# Patient Record
Sex: FEMALE | Race: BLACK OR AFRICAN AMERICAN | Employment: OTHER | URBAN - METROPOLITAN AREA
[De-identification: names, ages, dates, MRNs, and addresses within clinical notes are randomized per-mention and may not be internally consistent; named-entity substitution may affect disease eponyms.]

---

## 2017-01-12 ENCOUNTER — OFFICE VISIT (OUTPATIENT)
Dept: FAMILY MEDICINE | Facility: CLINIC | Age: 77
End: 2017-01-12
Payer: COMMERCIAL

## 2017-01-12 VITALS
OXYGEN SATURATION: 99 % | DIASTOLIC BLOOD PRESSURE: 68 MMHG | TEMPERATURE: 99.1 F | BODY MASS INDEX: 27.32 KG/M2 | HEART RATE: 60 BPM | SYSTOLIC BLOOD PRESSURE: 130 MMHG | WEIGHT: 164 LBS | RESPIRATION RATE: 16 BRPM | HEIGHT: 65 IN

## 2017-01-12 DIAGNOSIS — G44.229 CHRONIC TENSION-TYPE HEADACHE, NOT INTRACTABLE: ICD-10-CM

## 2017-01-12 DIAGNOSIS — K59.01 SLOW TRANSIT CONSTIPATION: ICD-10-CM

## 2017-01-12 DIAGNOSIS — R30.0 DYSURIA: ICD-10-CM

## 2017-01-12 DIAGNOSIS — J30.1 SEASONAL ALLERGIC RHINITIS DUE TO POLLEN: ICD-10-CM

## 2017-01-12 DIAGNOSIS — R53.81 MALAISE AND FATIGUE: ICD-10-CM

## 2017-01-12 DIAGNOSIS — H90.5 SENSORY HEARING LOSS, UNILATERAL: ICD-10-CM

## 2017-01-12 DIAGNOSIS — R41.3 MEMORY LOSS: ICD-10-CM

## 2017-01-12 DIAGNOSIS — K21.9 GASTROESOPHAGEAL REFLUX DISEASE WITHOUT ESOPHAGITIS: Chronic | ICD-10-CM

## 2017-01-12 DIAGNOSIS — Z13.1 SCREENING FOR DIABETES MELLITUS: ICD-10-CM

## 2017-01-12 DIAGNOSIS — F41.1 ANXIETY STATE: ICD-10-CM

## 2017-01-12 DIAGNOSIS — E55.9 VITAMIN D INSUFFICIENCY: ICD-10-CM

## 2017-01-12 DIAGNOSIS — M81.0 OSTEOPOROSIS: ICD-10-CM

## 2017-01-12 DIAGNOSIS — E55.9 VITAMIN D DEFICIENCY DISEASE: ICD-10-CM

## 2017-01-12 DIAGNOSIS — R53.83 MALAISE AND FATIGUE: ICD-10-CM

## 2017-01-12 DIAGNOSIS — R35.0 URINARY FREQUENCY: ICD-10-CM

## 2017-01-12 DIAGNOSIS — H04.123 TEAR FILM INSUFFICIENCY, BILATERAL: ICD-10-CM

## 2017-01-12 DIAGNOSIS — R41.3 POOR MEMORY: ICD-10-CM

## 2017-01-12 DIAGNOSIS — G44.209 TENSION HEADACHE: ICD-10-CM

## 2017-01-12 DIAGNOSIS — F32.5 MAJOR DEPRESSION IN COMPLETE REMISSION (H): ICD-10-CM

## 2017-01-12 DIAGNOSIS — E78.5 HYPERLIPIDEMIA LDL GOAL <160: Primary | Chronic | ICD-10-CM

## 2017-01-12 DIAGNOSIS — M51.369 DDD (DEGENERATIVE DISC DISEASE), LUMBAR: Chronic | ICD-10-CM

## 2017-01-12 DIAGNOSIS — K64.4 EXTERNAL HEMORRHOIDS: ICD-10-CM

## 2017-01-12 LAB
ALBUMIN UR-MCNC: NEGATIVE MG/DL
APPEARANCE UR: CLEAR
BILIRUB UR QL STRIP: NEGATIVE
COLOR UR AUTO: YELLOW
ERYTHROCYTE [DISTWIDTH] IN BLOOD BY AUTOMATED COUNT: 13.1 % (ref 10–15)
GLUCOSE UR STRIP-MCNC: NEGATIVE MG/DL
HCT VFR BLD AUTO: 39.4 % (ref 35–47)
HGB BLD-MCNC: 13.1 G/DL (ref 11.7–15.7)
HGB UR QL STRIP: NEGATIVE
KETONES UR STRIP-MCNC: NEGATIVE MG/DL
LEUKOCYTE ESTERASE UR QL STRIP: NEGATIVE
MCH RBC QN AUTO: 30.4 PG (ref 26.5–33)
MCHC RBC AUTO-ENTMCNC: 33.2 G/DL (ref 31.5–36.5)
MCV RBC AUTO: 91 FL (ref 78–100)
NITRATE UR QL: NEGATIVE
PH UR STRIP: 5.5 PH (ref 5–7)
PLATELET # BLD AUTO: 163 10E9/L (ref 150–450)
RBC # BLD AUTO: 4.31 10E12/L (ref 3.8–5.2)
SP GR UR STRIP: 1.01 (ref 1–1.03)
URN SPEC COLLECT METH UR: NORMAL
UROBILINOGEN UR STRIP-ACNC: 1 EU/DL (ref 0.2–1)
WBC # BLD AUTO: 6.2 10E9/L (ref 4–11)

## 2017-01-12 PROCEDURE — 85027 COMPLETE CBC AUTOMATED: CPT | Performed by: FAMILY MEDICINE

## 2017-01-12 PROCEDURE — 80048 BASIC METABOLIC PNL TOTAL CA: CPT | Performed by: FAMILY MEDICINE

## 2017-01-12 PROCEDURE — 36415 COLL VENOUS BLD VENIPUNCTURE: CPT | Performed by: FAMILY MEDICINE

## 2017-01-12 PROCEDURE — 99214 OFFICE O/P EST MOD 30 MIN: CPT | Performed by: FAMILY MEDICINE

## 2017-01-12 PROCEDURE — 82306 VITAMIN D 25 HYDROXY: CPT | Performed by: FAMILY MEDICINE

## 2017-01-12 PROCEDURE — 81003 URINALYSIS AUTO W/O SCOPE: CPT | Performed by: FAMILY MEDICINE

## 2017-01-12 RX ORDER — POLYETHYLENE GLYCOL 3350 17 G/17G
1 POWDER, FOR SOLUTION ORAL DAILY
Qty: 510 G | Refills: 1 | Status: SHIPPED | OUTPATIENT
Start: 2017-01-12 | End: 2017-09-14

## 2017-01-12 RX ORDER — FLUTICASONE PROPIONATE 50 MCG
1 SPRAY, SUSPENSION (ML) NASAL DAILY
Qty: 16 G | Refills: 11 | Status: SHIPPED | OUTPATIENT
Start: 2017-01-12 | End: 2017-09-14

## 2017-01-12 RX ORDER — ACETAMINOPHEN 325 MG/1
325-650 TABLET ORAL EVERY 6 HOURS PRN
Qty: 180 TABLET | Refills: 11 | Status: SHIPPED | OUTPATIENT
Start: 2017-01-12 | End: 2017-06-01

## 2017-01-12 RX ORDER — CHOLECALCIFEROL (VITAMIN D3) 50 MCG
2 TABLET ORAL
Qty: 60 TABLET | Refills: 11 | Status: SHIPPED | OUTPATIENT
Start: 2017-01-12 | End: 2017-09-14

## 2017-01-12 RX ORDER — ATORVASTATIN CALCIUM 10 MG/1
10 TABLET, FILM COATED ORAL DAILY
Qty: 30 TABLET | Refills: 11 | Status: SHIPPED | OUTPATIENT
Start: 2017-01-12 | End: 2017-06-15

## 2017-01-12 RX ORDER — LIDOCAINE 50 MG/G
OINTMENT TOPICAL
Qty: 142 G | Refills: 11 | Status: SHIPPED
Start: 2017-01-12 | End: 2017-09-14

## 2017-01-12 RX ORDER — ALUMINA, MAGNESIA, AND SIMETHICONE 2400; 2400; 240 MG/30ML; MG/30ML; MG/30ML
30 SUSPENSION ORAL 4 TIMES DAILY PRN
Qty: 1 BOTTLE | Refills: 11 | Status: SHIPPED | OUTPATIENT
Start: 2017-01-12 | End: 2017-09-14

## 2017-01-12 RX ORDER — BENZOCAINE/MENTHOL 6 MG-10 MG
LOZENGE MUCOUS MEMBRANE
Qty: 30 G | Refills: 5 | Status: SHIPPED | OUTPATIENT
Start: 2017-01-12 | End: 2018-10-05

## 2017-01-12 ASSESSMENT — ANXIETY QUESTIONNAIRES
6. BECOMING EASILY ANNOYED OR IRRITABLE: NOT AT ALL
IF YOU CHECKED OFF ANY PROBLEMS ON THIS QUESTIONNAIRE, HOW DIFFICULT HAVE THESE PROBLEMS MADE IT FOR YOU TO DO YOUR WORK, TAKE CARE OF THINGS AT HOME, OR GET ALONG WITH OTHER PEOPLE: NOT DIFFICULT AT ALL
1. FEELING NERVOUS, ANXIOUS, OR ON EDGE: NOT AT ALL
5. BEING SO RESTLESS THAT IT IS HARD TO SIT STILL: NOT AT ALL
3. WORRYING TOO MUCH ABOUT DIFFERENT THINGS: NOT AT ALL
7. FEELING AFRAID AS IF SOMETHING AWFUL MIGHT HAPPEN: NOT AT ALL
GAD7 TOTAL SCORE: 0
2. NOT BEING ABLE TO STOP OR CONTROL WORRYING: NOT AT ALL

## 2017-01-12 ASSESSMENT — PATIENT HEALTH QUESTIONNAIRE - PHQ9: 5. POOR APPETITE OR OVEREATING: NOT AT ALL

## 2017-01-12 NOTE — MR AVS SNAPSHOT
After Visit Summary   1/12/2017    Rody Gonzalez    MRN: [id number]           Patient Information     Date Of Birth          1940        Visit Information        Provider Department      1/12/2017 2:00 PM Osman Yen MD; [employer] [facility]        Today's Diagnoses     Hyperlipidemia LDL goal <160    -  1     Gastroesophageal reflux disease without esophagitis         Vitamin D deficiency disease         DDD (degenerative disc disease), lumbar         Major depression in complete remission (H)         Sensory hearing loss, unilateral         Anxiety state         Urinary frequency         Osteoporosis         Tear film insufficiency, bilateral         Memory loss         Chronic tension-type headache, not intractable         Malaise and fatigue         Screening for diabetes mellitus         Dysuria         Tension headache         Vitamin D insufficiency         Slow transit constipation         External hemorrhoids         Seasonal allergic rhinitis due to pollen         Poor memory           Care Instructions      (E78.5) Hyperlipidemia LDL goal <160  (primary encounter diagnosis)    Comment:      Plan: atorvastatin (LIPITOR) 10 MG tablet                   (K21.9) Gastroesophageal reflux disease without esophagitis    Comment:      Plan: CBC with platelets, omeprazole (PRILOSEC) 20 MG          CR capsule, alum & mag hydroxide-simethicone           (MYLANTA ES/MAALOX  ES) 400-400-40 MG/5ML SUSP           suspension                   (E55.9) Vitamin D deficiency disease    Comment:      Plan: Vitamin D Deficiency                   (M51.36) DDD (degenerative disc disease), lumbar    Comment:      Plan:          (F32.5) Major depression in complete remission (H)    Comment:      Plan:          (H90.5) Sensory hearing loss, unilateral    Comment:      Plan:          (F41.1) Anxiety state    Comment:          Plan:          (R35.0)  Urinary frequency    Comment:      Plan:          (M81.0) Osteoporosis    Comment:      Plan: lidocaine (XYLOCAINE) 5 % ointment                   (H04.123) Tear film insufficiency, bilateral    Comment:      Plan:          (R41.3) Memory loss    Comment:      Plan:          (G44.229) Chronic tension-type headache, not intractable    Comment:      Plan:          (R53.81,  R53.83) Malaise and fatigue    Comment:      Plan:          (Z13.1) Screening for diabetes mellitus    Comment:      Plan: Basic metabolic panel, UA reflex to Microscopic          and Culture                   (R30.0) Dysuria    Comment:      Plan: Basic metabolic panel                   (G44.209) Tension headache    Comment:      Plan: aspirin-acetaminophen-caffeine (EXCEDRIN           MIGRAINE) 250-250-65 MG per tablet                   (E55.9) Vitamin D insufficiency    Comment:      Plan: acetaminophen (MAPAP) 325 MG tablet,           Cholecalciferol (VITAMIN D3) 2000 UNITS TABS                   (K59.01) Slow transit constipation    Comment:      Plan: polyethylene glycol (MIRALAX) powder                   (K64.4) External hemorrhoids    Comment:      Plan: hydrocortisone (CORTAID) 1 % cream,           hydrocortisone (ANUSOL-HC) 2.5 % cream                   (J30.1) Seasonal allergic rhinitis due to pollen    Comment:      Plan: fluticasone (FLONASE) 50 MCG/ACT spray                           Follow-ups after your visit        Additional Services     HOME CARE NURSING REFERRAL       **Order classes of: [facility], [facility] and [facility] will route to the Home Care and Hospice Referral Pool.  Home Care or Hospice will then contact the patient to schedule their appointment.**    If you do not hear from Home Care and Hospice, or you would like to call to schedule, please call the referring place of service that your provider has listed below.  ______________________________________________________________________    Your provider has  referred you to: [facility]: [facility] [phone]   [email or web address]    Extended Emergency Contact Information  Primary Emergency Contact: Eve Wells   [facility]  Home Phone: [phone]  Relation: Daughter  Secondary Emergency Contact: no secondary contact   [facility] Phone: [phone]  Relation: None    Patient Anticipated Discharge Date:  OUTPATIENT    RN, PT, HHA to begin 24 - 48 hours after discharge.  PLEASE EVALUATE AND TREAT (Evaluation timeline is 24 - 48 hrs. Please call if there is need for a variance to this timeline).    REASON FOR REFERRAL: Assessment & Treatment: RN and  MEDICATION SETUP WEEKLY     ADDITIONAL SERVICES NEEDED:      OTHER PERTINENT INFORMATION: Patient was last seen by provider on GAYLA VEGA JR., MD  for 01/12/2017 .    Current Outpatient Prescriptions:  aspirin-acetaminophen-caffeine (EXCEDRIN MIGRAINE) 250-250-65 MG per tablet, Take 1 tablet by mouth every 6 hours as needed for headaches, Disp: 80 tablet, Rfl: 11  acetaminophen (MAPAP) 325 MG tablet, Take 1-2 tablets (325-650 mg) by mouth every 6 hours as needed for mild pain, Disp: 180 tablet, Rfl: 11  omeprazole (PRILOSEC) 20 MG CR capsule, Take 1 capsule (20 mg) by mouth daily, Disp: 30 capsule, Rfl: 11  lidocaine (XYLOCAINE) 5 % ointment, One application 4 x daily to affected areas lower back and knees if necessary  Profile Rx: patient will contact pharmacy when needed, Disp: 142 g, Rfl: 11  alum & mag hydroxide-simethicone (MYLANTA ES/MAALOX  ES) 400-400-40 MG/5ML SUSP suspension, Take 30 mLs by mouth 4 times daily as needed for indigestion, Disp: 1 Bottle, Rfl: 11  polyethylene glycol (MIRALAX) powder, Take 17 g (1 capful) by mouth daily, Disp: 510 g, Rfl: 1  hydrocortisone (CORTAID) 1 % cream, Apply sparingly to affected area three times daily for 14 days., Disp: 30 g, Rfl: 5  atorvastatin (LIPITOR) 10 MG tablet, Take 1 tablet (10 mg) by  mouth daily, Disp: 30 tablet, Rfl: 11  hydrocortisone (ANUSOL-HC) 2.5 % cream, Place rectally 2 times daily, Disp: 30 g, Rfl: 11  fluticasone (FLONASE) 50 MCG/ACT spray, Spray 1 spray into both nostrils daily, Disp: 16 g, Rfl: 11  Cholecalciferol (VITAMIN D3) 2000 UNITS TABS, Take 2 tablets by mouth daily (with breakfast), Disp: 60 tablet, Rfl: 11  loratadine (QC LORATADINE ALLERGY RELIEF) 10 MG tablet, Take 1 tablet (10 mg) by mouth daily as needed, Disp: 30 tablet, Rfl: 11  aspirin 81 MG chewable tablet, Take 1 tablet (81 mg) by mouth daily, Disp: 108 tablet, Rfl: 3  glucosamine-chondroitinoitin (CVS GLUCOSAMINE-CHONDROITIN) 500-400 MG TABS, Take 2 tablets by mouth 2 times daily, Disp: 120 tablet, Rfl: 11  [DISCONTINUED] atorvastatin (LIPITOR) 10 MG tablet, Take 1 tablet (10 mg) by mouth daily, Disp: 30 tablet, Rfl: 11      Patient Active Problem List:     Health Care Home     Gastroesophageal reflux disease without esophagitis     DDD (degenerative disc disease), lumbar     Dysuria     Anxiety state     Urinary frequency     Tuberculosis of peripheral lymph nodes     Nonspecific abnormal results of thyroid function study     Pulmonary tuberculosis     PPD positive     Papanicolaou smear of cervix with atypical squamous cells of undetermined significance (ASC-US)     Osteoporosis     Tear film insufficiency     Memory loss     Headache     Abdominal pain     Closed fracture of triquetral (cuneiform) bone of wrist     Other chronic pain     Abnormality of gait     Hearing loss     Malaise and fatigue     Acute cystitis     Major depression in complete remission (H)     Sensory hearing loss, unilateral     Vitamin D deficiency disease     Hyperlipidemia LDL goal <160     Overweight (BMI 25.0-29.9)     Risk for falls     ACP (advance care planning)     Primary osteoarthritis of both knees     Tension headache      Documentation of Face to Face and Certification for Home Health Services    I certify that patient,  Rody Gonzalez is under my care and that I, or a Nurse Practitioner or Physician's Assistant working with me, had a face-to-face encounter that meets the physician face-to-face encounter requirements with this patient on: 01/12/2017 .    This encounter with the patient was in whole, or in part, for the following medical condition, which is the primary reason for Home Health Care:  MEDICATION CONFUSION AND MANAGEMEN .    I certify that, based on my findings, the following services are medically necessary Home Health Services: Nursing    My clinical findings support the need for the above services because: Nurse is needed: To assess  MEDICATION COMPLIANCE after changes in medications or other medical regimen..    Further, I certify that my clinical findings support that this patient is homebound (i.e. absences from home require considerable and taxing effort and are for medical reasons or Religion services or infrequently or of short duration when for other reasons) because: Requires assistance of another person or specialized equipment to access medical services because patient:   LIVES ALONE .    Based on the above findings, I certify that this patient is confined to the home and needs intermittent skilled nursing care, physical therapy and/or speech therapy.  The patient is under my care, and I have initiated the establishment of the plan of care.  This patient will be followed by a physician who will periodically review the plan of care.    Physician/Provider to provide follow up care: Gayla Vega    [facility] certified Physician at time of discharge:  GAYLA VEGA JR., MD      Please be aware that coverage of these services is subject to the terms and limitations of your health insurance plan.  Call member services at your health plan with any benefit or coverage questions.                  Who to contact     If you have questions or need follow up information about today's clinic visit  "or your schedule please contact [facility] directly at [phone].  Normal or non-critical lab and imaging results will be communicated to you by MyChart, letter or phone within 4 business days after the clinic has received the results. If you do not hear from us within 7 days, please contact the clinic through [employer] or phone. If you have a critical or abnormal lab result, we will notify you by phone as soon as possible.  Submit refill requests through [employer] or call your pharmacy and they will forward the refill request to us. Please allow 3 business days for your refill to be completed.          Additional Information About Your Visit        [employer] [facility] Information     [employer] lets you send messages to your doctor, view your test results, renew your prescriptions, schedule appointments and more. To sign up, go to [email or web address] . Click on \"Log in\" on the left side of the screen, which will take you to the Welcome page. Then click on \"Sign up Now\" on the right side of the page.     You will be asked to enter the access code listed below, as well as some personal information. Please follow the directions to create your username and password.     Your access code is: [email or web address]  Expires: 2017  3:03 PM     Your access code will  in 90 days. If you need help or a new code, please call your [place] clinic or [phone].        Care EveryWhere ID     This is your Care EveryWhere ID. This could be used by other organizations to access your [place] medical records  [id number]        Your Vitals Were     Pulse Temperature Respirations Height BMI (Body Mass Index) Pulse Oximetry    60 99.1  F (37.3  C) (Tympanic) 16 5' 4.5\" (1.638 m) 27.73 kg/m2 99%       Blood Pressure from Last 3 Encounters:   17 130/68   16 146/68   16 132/78    Weight from Last 3 Encounters:   17 164 lb (74.39 kg)   16 162 lb (73.483 kg)   16 167 lb " (75.751 kg)              We Performed the Following     Basic metabolic panel     CBC with platelets     HOME CARE NURSING REFERRAL     UA reflex to Microscopic and Culture     Vitamin D Deficiency          Where to get your medicines      These medications were sent to [facility] - [place], [place] - [address]  [address] [address]     Phone:  [phone]    - acetaminophen 325 MG tablet  - alum & mag hydroxide-simethicone 400-400-40 MG/5ML Susp suspension  - aspirin-acetaminophen-caffeine 250-250-65 MG per tablet  - atorvastatin 10 MG tablet  - fluticasone 50 MCG/ACT spray  - hydrocortisone 1 % cream  - hydrocortisone 2.5 % cream  - lidocaine 5 % ointment  - omeprazole 20 MG CR capsule  - polyethylene glycol powder  - Vitamin D3 2000 UNITS Tabs       Primary Care Provider Office Phone # Fax #    Osman Yen [phone]       [facility] [address]  [facility] [address]        Thank you!     Thank you for choosing [facility]  for your care. Our goal is always to provide you with excellent care. Hearing back from our patients is one way we can continue to improve our services. Please take a few minutes to complete the written survey that you may receive in the mail after your visit with us. Thank you!             Your Updated Medication List - Protect others around you: Learn how to safely use, store and throw away your medicines at www.disposemymeds.org.          This list is accurate as of: 1/12/17  3:09 PM.  Always use your most recent med list.                   Brand Name Dispense Instructions for use    acetaminophen 325 MG tablet    MAPAP    180 tablet    Take 1-2 tablets (325-650 mg) by mouth every 6 hours as needed for mild pain       alum & mag hydroxide-simethicone 400-400-40 MG/5ML Susp suspension    MYLANTA ES/MAALOX  ES    1 Bottle    Take 30 mLs by mouth 4 times daily as needed  for indigestion       aspirin 81 MG chewable tablet     108 tablet    Take 1 tablet (81 mg) by mouth daily       aspirin-acetaminophen-caffeine 250-250-65 MG per tablet    EXCEDRIN MIGRAINE    80 tablet    Take 1 tablet by mouth every 6 hours as needed for headaches       atorvastatin 10 MG tablet    LIPITOR    30 tablet    Take 1 tablet (10 mg) by mouth daily       fluticasone 50 MCG/ACT spray    FLONASE    16 g    Spray 1 spray into both nostrils daily       glucosamine-chondroitinoitin 500-400 MG Tabs    CVS GLUCOSAMINE-CHONDROITIN    120 tablet    Take 2 tablets by mouth 2 times daily       hydrocortisone 1 % cream    CORTAID    30 g    Apply sparingly to affected area three times daily for 14 days.       hydrocortisone 2.5 % cream    ANUSOL-HC    30 g    Place rectally 2 times daily       lidocaine 5 % ointment    XYLOCAINE    142 g    One application 4 x daily to affected areas lower back and knees if necessary Profile Rx: patient will contact pharmacy when needed       loratadine 10 MG tablet    QC LORATADINE ALLERGY RELIEF    30 tablet    Take 1 tablet (10 mg) by mouth daily as needed       omeprazole 20 MG CR capsule    priLOSEC    30 capsule    Take 1 capsule (20 mg) by mouth daily       polyethylene glycol powder    MIRALAX    510 g    Take 17 g (1 capful) by mouth daily       Vitamin D3 2000 UNITS Tabs     60 tablet    Take 2 tablets by mouth daily (with breakfast)

## 2017-01-12 NOTE — PATIENT INSTRUCTIONS
(E78.5) Hyperlipidemia LDL goal <160  (primary encounter diagnosis)    Comment:      Plan: atorvastatin (LIPITOR) 10 MG tablet                   (K21.9) Gastroesophageal reflux disease without esophagitis    Comment:      Plan: CBC with platelets, omeprazole (PRILOSEC) 20 MG          CR capsule, alum & mag hydroxide-simethicone           (MYLANTA ES/MAALOX  ES) 400-400-40 MG/5ML SUSP           suspension                   (E55.9) Vitamin D deficiency disease    Comment:      Plan: Vitamin D Deficiency                   (M51.36) DDD (degenerative disc disease), lumbar    Comment:      Plan:          (F32.5) Major depression in complete remission (H)    Comment:      Plan:          (H90.5) Sensory hearing loss, unilateral    Comment:      Plan:          (F41.1) Anxiety state    Comment:          Plan:          (R35.0) Urinary frequency    Comment:      Plan:          (M81.0) Osteoporosis    Comment:      Plan: lidocaine (XYLOCAINE) 5 % ointment                   (H04.123) Tear film insufficiency, bilateral    Comment:      Plan:          (R41.3) Memory loss    Comment:      Plan:          (G44.229) Chronic tension-type headache, not intractable    Comment:      Plan:          (R53.81,  R53.83) Malaise and fatigue    Comment:      Plan:          (Z13.1) Screening for diabetes mellitus    Comment:      Plan: Basic metabolic panel, UA reflex to Microscopic          and Culture                   (R30.0) Dysuria    Comment:      Plan: Basic metabolic panel                   (G44.209) Tension headache    Comment:      Plan: aspirin-acetaminophen-caffeine (EXCEDRIN           MIGRAINE) 250-250-65 MG per tablet                   (E55.9) Vitamin D insufficiency    Comment:      Plan: acetaminophen (MAPAP) 325 MG tablet,           Cholecalciferol (VITAMIN D3) 2000 UNITS TABS                   (K59.01) Slow transit constipation    Comment:      Plan: polyethylene glycol (MIRALAX) powder                   (K64.4) External  hemorrhoids    Comment:      Plan: hydrocortisone (CORTAID) 1 % cream,           hydrocortisone (ANUSOL-HC) 2.5 % cream                   (J30.1) Seasonal allergic rhinitis due to pollen    Comment:      Plan: fluticasone (FLONASE) 50 MCG/ACT spray

## 2017-01-12 NOTE — Clinical Note
[facility]  [address]  [address]  [facility] [address] [phone]                                                                                                           Rody Gonzalez  [address]   [facility] [address]    January 13, 2017      Dear Rody,    The results of your recent tests were reviewed and are enclosed.     NORMAL COMPLETE BLOOD PANEL WBCS RBCS AND PLATELETS   NORMAL URINE ANALYSIS     Results for orders placed or performed in visit on 01/12/17   Basic metabolic panel   Result Value Ref Range    Sodium 139 133 - 144 mmol/L    Potassium 4.2 3.4 - 5.3 mmol/L    Chloride 109 94 - 109 mmol/L    Carbon Dioxide 22 20 - 32 mmol/L    Anion Gap 8 3 - 14 mmol/L    Glucose 92 70 - 99 mg/dL    Urea Nitrogen 17 7 - 30 mg/dL    Creatinine 0.63 0.52 - 1.04 mg/dL    GFR Estimate >90  Non  GFR Calc   >60 mL/min/1.7m2    GFR Estimate If Black >90   GFR Calc   >60 mL/min/1.7m2    Calcium 10.5 (H) 8.5 - 10.1 mg/dL   CBC with platelets   Result Value Ref Range    WBC 6.2 4.0 - 11.0 10e9/L    RBC Count 4.31 3.8 - 5.2 10e12/L    Hemoglobin 13.1 11.7 - 15.7 g/dL    Hematocrit 39.4 35.0 - 47.0 %    MCV 91 78 - 100 fl    MCH 30.4 26.5 - 33.0 pg    MCHC 33.2 31.5 - 36.5 g/dL    RDW 13.1 10.0 - 15.0 %    Platelet Count 163 150 - 450 10e9/L   UA reflex to Microscopic and Culture   Result Value Ref Range    Color Urine Yellow     Appearance Urine Clear     Glucose Urine Negative NEG mg/dL    Bilirubin Urine Negative NEG    Ketones Urine Negative NEG mg/dL    Specific Gravity Urine 1.015 1.003 - 1.035    Blood Urine Negative NEG    pH Urine 5.5 5.0 - 7.0 pH    Protein Albumin Urine Negative NEG mg/dL    Urobilinogen Urine 1.0 0.2 - 1.0 EU/dL    Nitrite Urine Negative NEG    Leukocyte Esterase Urine Negative NEG    Source Midstream Urine            Thank you for choosing [facility].  We  appreciate the opportunity to serve you and look forward to supporting your healthcare needs in the future.    If you have any questions or concerns, please call me or my staff at [phone].      Sincerely,    Osman Yen Jr MD

## 2017-01-12 NOTE — NURSING NOTE
"Chief Complaint   Patient presents with     Headache       Initial /68 mmHg  Pulse 60  Temp(Src) 99.1  F (37.3  C) (Tympanic)  Resp 16  Ht 5' 4.5\" (1.638 m)  Wt 164 lb (74.39 kg)  BMI 27.73 kg/m2  SpO2 99% Estimated body mass index is 27.73 kg/(m^2) as calculated from the following:    Height as of this encounter: 5' 4.5\" (1.638 m).    Weight as of this encounter: 164 lb (74.39 kg).  BP completed using cuff size: ayala Lunsford CMA      "

## 2017-01-12 NOTE — PROGRESS NOTES
"  SUBJECTIVE:                                                    Rody Gonzalez is a 77 year old female who presents to clinic today for the following health issues:  Health Maintenance Due   Topic Date Due     ANA LAURA QUESTIONNAIRE 1 YEAR  1940     PNEUMOCOCCAL (2 of 2 - PCV13) 09/01/2007     INFLUENZA VACCINE (SYSTEM ASSIGNED)  09/01/2016     TETANUS IMMUNIZATION (SYSTEM ASSIGNED)  09/01/2016     PHQ-9 Q6 MONTHS (NO INBASKET)  11/10/2016     Health Maintenance reviewed at today's visit patient asked to schedule/complete:   Depression:  Completed at today's visit.  Immunizations:  Patient declined      Headaches      Duration: ongoing    Description  Location: unilateral in the right temporal area   Character: throbbing pain  Frequency:  Usually everyday  Duration:  Couple hours, come and go    Intensity:  moderate    Accompanying signs and symptoms:    Precipitating or Alleviating factors:  Nausea/vomiting: no  Dizziness: no  Weakness or numbness: no  Visual changes: none  Fever: no     Sinus or URI symptoms no, has ringing in the right ear    History  Head trauma: YES- fell down steps over 10 years ago   Family history of migraines: no   Previous tests for headaches: no  Neurologist evaluations: no  Able to do daily activities when headache present: YES  Wake with headaches: YES- at times  Daily pain medication use: YES  Any changes in: na    Precipitating or Alleviating factors (light/sound/sleep/caffeine): na    Therapies tried and outcome: Tylenol    Outcome - not effective  Frequent/daily pain medication use: no       LUMBARD DISC DISEASE STABLE AND IMPROVED    GASTROESOPHAGEAL REFLUX DISEASE WITHOUT ESOPHAGITIS IMPROVED    INTERMITTENT DYSURIA     HISTORY OF TREATMENT ACTIVE TB RESOLVED     HISTORY OF ASCUS     LEFT EAR HEARING LOSS PROFOUND    CHRONIC PAIN SYNDROME     OVER WEIGHT     LDL OR \"BAD\" CHOLESTEROL  GOAL < 100     STATIN WITHOUT COMPLICATION     OSTEOARTHRITIS BOTH KNEES IMPROVED with " [facility]     OVER WEIGHT   .  Current Outpatient Prescriptions   Medication Sig Dispense Refill     aspirin-acetaminophen-caffeine (EXCEDRIN MIGRAINE) 250-250-65 MG per tablet Take 1 tablet by mouth every 6 hours as needed for headaches 80 tablet 11     acetaminophen (MAPAP) 325 MG tablet Take 1-2 tablets (325-650 mg) by mouth every 6 hours as needed for mild pain 180 tablet 11     omeprazole (PRILOSEC) 20 MG CR capsule Take 1 capsule (20 mg) by mouth daily 30 capsule 11     lidocaine (XYLOCAINE) 5 % ointment One application 4 x daily to affected areas lower back and knees if necessary  Profile Rx: patient will contact pharmacy when needed 142 g 11     alum & mag hydroxide-simethicone (MYLANTA ES/MAALOX  ES) 400-400-40 MG/5ML SUSP suspension Take 30 mLs by mouth 4 times daily as needed for indigestion 1 Bottle 11     polyethylene glycol (MIRALAX) powder Take 17 g (1 capful) by mouth daily 510 g 1     hydrocortisone (CORTAID) 1 % cream Apply sparingly to affected area three times daily for 14 days. 30 g 5     atorvastatin (LIPITOR) 10 MG tablet Take 1 tablet (10 mg) by mouth daily 30 tablet 11     hydrocortisone (ANUSOL-HC) 2.5 % cream Place rectally 2 times daily 30 g 11     fluticasone (FLONASE) 50 MCG/ACT spray Spray 1 spray into both nostrils daily 16 g 11     Cholecalciferol (VITAMIN D3) 2000 UNITS TABS Take 2 tablets by mouth daily (with breakfast) 60 tablet 11     loratadine (QC LORATADINE ALLERGY RELIEF) 10 MG tablet Take 1 tablet (10 mg) by mouth daily as needed 30 tablet 11     aspirin 81 MG chewable tablet Take 1 tablet (81 mg) by mouth daily 108 tablet 3     glucosamine-chondroitinoitin ([facility]S GLUCOSAMINE-CHONDROITIN) 500-400 MG TABS Take 2 tablets by mouth 2 times daily 120 tablet 11     [DISCONTINUED] atorvastatin (LIPITOR) 10 MG tablet Take 1 tablet (10 mg) by mouth daily 30 tablet 11        No Known Allergies    Immunization History   Administered Date(s) Administered     Pneumococcal 23 valent  "2006     Tdap (Adacel,Boostrix) 2006         reports that she does not drink alcohol.      reports that she does not use illicit drugs.    family history includes Family History Negative in her father and mother.    indicated that her mother is . She indicated that her father is .      has past surgical history that includes Hysterectomy ().     reports that she does not currently engage in sexual activity.  .  Pediatric History   Patient Guardian Status     Not on file.     Other Topics Concern     Parent/Sibling W/ Cabg, Mi Or Angioplasty Before 65f 55m? No     Social History Narrative         reports that she has never smoked. She has never used smokeless tobacco.    Medical, social, surgical, and family histories reviewed.    /68 mmHg  Pulse 60  Temp(Src) 99.1  F (37.3  C) (Tympanic)  Resp 16  Ht 5' 4.5\" (1.638 m)  Wt 164 lb (74.39 kg)  BMI 27.73 kg/m2  SpO2 99%    Body mass index is 27.73 kg/(m^2).      Problem list, Medication list, Allergies, and Medical/Social/Surgical histories reviewed in [facility] and updated as appropriate.  Labs reviewed in [facility]  Patient Active Problem List   Diagnosis     Health Care Home     Gastroesophageal reflux disease without esophagitis     DDD (degenerative disc disease), lumbar     Dysuria     Anxiety state     Urinary frequency     Tuberculosis of peripheral lymph nodes     Nonspecific abnormal results of thyroid function study     Pulmonary tuberculosis     PPD positive     Papanicolaou smear of cervix with atypical squamous cells of undetermined significance (ASC-US)     Osteoporosis     Tear film insufficiency     Memory loss     Headache     Abdominal pain     Closed fracture of triquetral (cuneiform) bone of wrist     Other chronic pain     Abnormality of gait     Hearing loss     Malaise and fatigue     Acute cystitis     Major depression in complete remission (H)     Sensory hearing loss, unilateral     Vitamin D deficiency " disease     Hyperlipidemia LDL goal <160     Overweight (BMI 25.0-29.9)     Risk for falls     ACP (advance care planning)     Primary osteoarthritis of both knees     Tension headache     Past Surgical History   Procedure Laterality Date     Hysterectomy  2004       Social History   Substance Use Topics     Smoking status: Never Smoker      Smokeless tobacco: Never Used     Alcohol Use: No     Family History   Problem Relation Age of Onset     Family History Negative Mother      Family History Negative Father          No Known Allergies  Recent Labs   Lab Test  04/13/16   0943  12/04/15   1530  04/10/15   1257  03/04/14   1050  09/13/13   1125   LDL  126*  125*  116  134*  120   HDL  41*  53  42*  42*  41*   TRIG  131  100  77  84  99   ALT   --   30  44  22   --    CR  0.70   --   0.80  0.70  0.70   GFRESTIMATED  81   --   70  82  82   GFRESTBLACK  >90   --   85  >90  >90   POTASSIUM  4.0   --   4.7  4.5  4.4   TSH   --    --    --   1.47  1.86        BP Readings from Last 6 Encounters:   01/12/17 130/68   09/09/16 146/68   05/13/16 132/78   04/07/16 138/62   12/18/15 128/74   12/04/15 128/72       Wt Readings from Last 3 Encounters:   01/12/17 164 lb (74.39 kg)   09/09/16 162 lb (73.483 kg)   05/13/16 167 lb (75.751 kg)         Positive symptoms or findings indicated by bold designation:     ROS: 10 point ROS neg other than the symptoms noted above in the HPI.except  has Health Care Home; Gastroesophageal reflux disease without esophagitis; DDD (degenerative disc disease), lumbar; Dysuria; Anxiety state; Urinary frequency; Tuberculosis of peripheral lymph nodes; Nonspecific abnormal results of thyroid function study; Pulmonary tuberculosis; PPD positive; Papanicolaou smear of cervix with atypical squamous cells of undetermined significance (ASC-US); Osteoporosis; Tear film insufficiency; Memory loss; Headache; Abdominal pain; Closed fracture of triquetral (cuneiform) bone of wrist; Other chronic pain; Abnormality  of gait; Hearing loss; Malaise and fatigue; Acute cystitis; Major depression in complete remission (H); Sensory hearing loss, unilateral; Vitamin D deficiency disease; Hyperlipidemia LDL goal <160; Overweight (BMI 25.0-29.9); Risk for falls; ACP (advance care planning); Primary osteoarthritis of both knees; and Tension headache on her problem list.   Constitutional: The patient denied fatigue, fever, insomnia, night sweats, recent illness and weight loss.  NORMAL WEIGHT     Eyes: The patient denied blindness, eye pain, eye tearing, photophobia, vision change and visual disturbance. NORMAL VISION     Ears/Nose/Throat/Neck: The patient denied dizziness, facial pain, hearing loss, nasal discharge, oral pain, otalgia, postnasal drip, sinus congestion, sore throat, tinnitus and voice change.   POOR HEARING LEFT EAR     Cardiovascular: The patient denied arrhythmia, chest pain/pressure, claudication, edema, exercise intolerance, fatigue, orthopnea, palpitations and syncope.      Respiratory: The patient denied asthma, chest congestion, cough, dyspnea on exertion, dyspnea/shortness of breath, hemoptysis, pedal edema, pleuritic pain, productive sputum, snoring and wheezing. HISTORY OF TB     Gastrointestinal: The patient denied abdominal pain, anorexia, constipation, diarrhea, dysphagia,  hematochezia, hemorrhoids, melena, nausea and vomiting . GASTROESOPHAGEAL REFLUX DISEASE WITHOUT ESOPHAGITIS     Genitourinary/Nephrology: The patient denied breast complaint, dysuria, nocturia sexual dysfunction, t, urinary frequency, urinary incontinence, urinary urgency        Musculoskeletal: The patient denied arthralgia(s), back pain, joint complaint, muscle weakness, myalgias, osteoporosis, sciatica, stiffness and swelling.  OSTEOARTHRITIS KNEES AND CHRONIC LOWER BACK PAIN     Dermatoligic:: The patient denied acne, dermatitis, ecchymosis, itching, mole change, rash, skin cancer, skin lesion and sores.      Neurologic: The patient  "denied dizziness, gait abnormality, headache, memory loss, mental status change, paresis, paresthesia, seizure, syncope, tremor and vision change.     Psychiatric: The patient denied anxiety, depression, disturbances of memory, drug abuse, insomnia, mood swings and relationship difficulties.      Endocrine: The patient denied , goiter, obesity, polyuria and thyroid disease.      Hematologic/Lymphatic: The patient denied abnormal bleeding and bruising, abnormal ecchymoses, anemia, lymph node enlargement/mass, petechiae and venous  Thrombosis.      Allergy/Immunology: The patient denied food allergy and  Allergic rhinitis or conjunctivitis.        PE:  /68 mmHg  Pulse 60  Temp(Src) 99.1  F (37.3  C) (Tympanic)  Resp 16  Ht 5' 4.5\" (1.638 m)  Wt 164 lb (74.39 kg)  BMI 27.73 kg/m2  SpO2 99% Body mass index is 27.73 kg/(m^2).    Constitutional: general appearance, well nourished, well developed, in no acute distress, well developed, appears stated age, normal body habitus,      Eyes:; The patient has normal eyelids sclerae and conjunctivae :      Ears/Nose/Throat: external ear, overall: normal appearance; external nose, overall: benign appearance, normal moujth gums and lips  The patient has:      Neck: thyroid, overall: normal size, normal consistency, nontender,      Respiratory:  palpation of chest, overall: normal excursion,    Clear to percussion and auscultation      Tachypnea  NORMAL  Color  NORMAL      Cardiovascular:  Good color with no peripheral edema    Regular sinus rhythm without murmur. Physiologic heart sounds Heart is unelarged  .   Chest/Breast: normal shape       Abdominal exam,  Liver and spleen are  unenlarged  NORMAL       Tenderness  NORMAL   Scars  NOT APPLICABLE      Urogenital; no renal, flank or bladder  tenderness; NORMAL     Lymphatic: neck nodes,  NORMAL    Other notes NOT APPLICABLE      Musculoskeletal:  Brief ortho exam normal except:  OSTEOARTHRITIS KNEES AND DECREASE RANGE " OF MOTION OF BACK   NORMAL STRAIGHT LEG RAISING TEST     Integument: inspection of skin, no rash, lesions; and, palpation, no induration, no tenderness.      Neurologic mental status, overall: alert and oriented; gait, no ataxia, no unsteadiness; coordination, no tremors; cranial nerves, overall: normal motor, overall: normal bulk, tone.      Psychiatric: orientation/consciousness, overall: oriented to person, place and time; behavior/psychomotor activity, no tics, normal psychomotor activity; mood and affect, overall: normal mood and affect; appearance, overall: well-groomed, good eye contact; speech, overall: normal quality, no aphasia and normal quality, quantity, intact.        ICD-10-CM    1. Hyperlipidemia LDL goal <160 E78.5 atorvastatin (LIPITOR) 10 MG tablet     HOME CARE NURSING REFERRAL   2. Gastroesophageal reflux disease without esophagitis K21.9 CBC with platelets     omeprazole (PRILOSEC) 20 MG CR capsule     alum & mag hydroxide-simethicone (MYLANTA ES/MAALOX  ES) 400-400-40 MG/5ML SUSP suspension     HOME CARE NURSING REFERRAL   3. Vitamin D deficiency disease E55.9 Vitamin D Deficiency     HOME CARE NURSING REFERRAL   4. DDD (degenerative disc disease), lumbar M51.36 HOME CARE NURSING REFERRAL   5. Major depression in complete remission (H) F32.5 HOME CARE NURSING REFERRAL   6. Sensory hearing loss, unilateral H90.5 HOME CARE NURSING REFERRAL   7. Anxiety state F41.1 HOME CARE NURSING REFERRAL   8. Urinary frequency R35.0 HOME CARE NURSING REFERRAL   9. Osteoporosis M81.0 lidocaine (XYLOCAINE) 5 % ointment     HOME CARE NURSING REFERRAL   10. Tear film insufficiency, bilateral H04.123 HOME CARE NURSING REFERRAL   11. Memory loss R41.3 HOME CARE NURSING REFERRAL   12. Chronic tension-type headache, not intractable G44.229 HOME CARE NURSING REFERRAL   13. Malaise and fatigue R53.81 HOME CARE NURSING REFERRAL    R53.83    14. Screening for diabetes mellitus Z13.1 Basic metabolic panel     UA reflex to  Microscopic and Culture   15. Dysuria R30.0 Basic metabolic panel     HOME CARE NURSING REFERRAL   16. Tension headache G44.209 aspirin-acetaminophen-caffeine (EXCEDRIN MIGRAINE) 250-250-65 MG per tablet     HOME CARE NURSING REFERRAL   17. Vitamin D insufficiency E55.9 acetaminophen (MAPAP) 325 MG tablet     Cholecalciferol (VITAMIN D3) 2000 UNITS TABS     HOME CARE NURSING REFERRAL   18. Slow transit constipation K59.01 polyethylene glycol (MIRALAX) powder     HOME CARE NURSING REFERRAL   19. External hemorrhoids K64.4 hydrocortisone (CORTAID) 1 % cream     hydrocortisone (ANUSOL-HC) 2.5 % cream     HOME CARE NURSING REFERRAL   20. Seasonal allergic rhinitis due to pollen J30.1 fluticasone (FLONASE) 50 MCG/ACT spray     HOME CARE NURSING REFERRAL   21. Poor memory R41.3 HOME CARE NURSING REFERRAL        .    Side effects benefits and risks thoroughly discussed. .she may come in early if unimproved or getting worse          Importance of adhering to regimen discussed and if medications were dispensed, the importance of taking medications discussed and bringing in the medications after every visit for chronic problems         Please drink 2 glasses of water prior to meals and walk 15-30 minutes after meals    I spent  25 MINUTES SPENT  with patient discussing the following issues    The primary encounter diagnosis was Hyperlipidemia LDL goal <160. Diagnoses of Gastroesophageal reflux disease without esophagitis, Vitamin D deficiency disease, DDD (degenerative disc disease), lumbar, Major depression in complete remission (H), Sensory hearing loss, unilateral, Anxiety state, Urinary frequency, Osteoporosis, Tear film insufficiency, bilateral, Memory loss, Chronic tension-type headache, not intractable, Malaise and fatigue, Screening for diabetes mellitus, Dysuria, Tension headache, Vitamin D insufficiency, Slow transit constipation, External hemorrhoids, Seasonal allergic rhinitis due to pollen, and Poor memory were  also pertinent to this visit. over half of which involved counseling and coordination of care.    Patient Instructions     (E78.5) Hyperlipidemia LDL goal <160  (primary encounter diagnosis)    Comment:      Plan: atorvastatin (LIPITOR) 10 MG tablet                   (K21.9) Gastroesophageal reflux disease without esophagitis    Comment:      Plan: CBC with platelets, omeprazole (PRILOSEC) 20 MG          CR capsule, alum & mag hydroxide-simethicone           (MYLANTA ES/MAALOX  ES) 400-400-40 MG/5ML SUSP           suspension                   (E55.9) Vitamin D deficiency disease    Comment:      Plan: Vitamin D Deficiency                   (M51.36) DDD (degenerative disc disease), lumbar    Comment:      Plan:          (F32.5) Major depression in complete remission (H)    Comment:      Plan:          (H90.5) Sensory hearing loss, unilateral    Comment:      Plan:          (F41.1) Anxiety state    Comment:          Plan:          (R35.0) Urinary frequency    Comment:      Plan:          (M81.0) Osteoporosis    Comment:      Plan: lidocaine (XYLOCAINE) 5 % ointment                   (H04.123) Tear film insufficiency, bilateral    Comment:      Plan:          (R41.3) Memory loss    Comment:      Plan:          (G44.229) Chronic tension-type headache, not intractable    Comment:      Plan:          (R53.81,  R53.83) Malaise and fatigue    Comment:      Plan:          (Z13.1) Screening for diabetes mellitus    Comment:      Plan: Basic metabolic panel, UA reflex to Microscopic          and Culture                   (R30.0) Dysuria    Comment:      Plan: Basic metabolic panel                   (G44.209) Tension headache    Comment:      Plan: aspirin-acetaminophen-caffeine (EXCEDRIN           MIGRAINE) 250-250-65 MG per tablet                   (E55.9) Vitamin D insufficiency    Comment:      Plan: acetaminophen (MAPAP) 325 MG tablet,           Cholecalciferol (VITAMIN D3) 2000 UNITS TABS                   (K59.01) Slow  transit constipation    Comment:      Plan: polyethylene glycol (MIRALAX) powder                   (K64.4) External hemorrhoids    Comment:      Plan: hydrocortisone (CORTAID) 1 % cream,           hydrocortisone (ANUSOL-HC) 2.5 % cream                   (J30.1) Seasonal allergic rhinitis due to pollen    Comment:      Plan: fluticasone (FLONASE) 50 MCG/ACT spray                         Diet:  MEDITERRANEAN DIET AND DIABETES     Exercise:  RANGE OF MOTION BACK WALKING AND KNEE PAIN EXERCISE   Exercises Range of motion, balance, isometric, and strengthening exercises 30 repetitions twice daily of involved joints      .GAYLA VEGA MD 1/12/2017 7:12 PM  January 12, 2017

## 2017-01-13 LAB
ANION GAP SERPL CALCULATED.3IONS-SCNC: 8 MMOL/L (ref 3–14)
BUN SERPL-MCNC: 17 MG/DL (ref 7–30)
CALCIUM SERPL-MCNC: 10.5 MG/DL (ref 8.5–10.1)
CHLORIDE SERPL-SCNC: 109 MMOL/L (ref 94–109)
CO2 SERPL-SCNC: 22 MMOL/L (ref 20–32)
CREAT SERPL-MCNC: 0.63 MG/DL (ref 0.52–1.04)
GFR SERPL CREATININE-BSD FRML MDRD: ABNORMAL ML/MIN/1.7M2
GLUCOSE SERPL-MCNC: 92 MG/DL (ref 70–99)
POTASSIUM SERPL-SCNC: 4.2 MMOL/L (ref 3.4–5.3)
SODIUM SERPL-SCNC: 139 MMOL/L (ref 133–144)

## 2017-01-13 ASSESSMENT — PATIENT HEALTH QUESTIONNAIRE - PHQ9: SUM OF ALL RESPONSES TO PHQ QUESTIONS 1-9: 5

## 2017-01-13 ASSESSMENT — ANXIETY QUESTIONNAIRES: GAD7 TOTAL SCORE: 0

## 2017-01-13 NOTE — PROGRESS NOTES
Quick Note:    Please send normal lab letter when labs are complete  NORMAL COMPLETE BLOOD PANEL WBCS RBCS AND PLATELETS   NORMAL URINE ANALYSIS   GAYLA VEGA JR., MD  ______

## 2017-01-14 NOTE — PROGRESS NOTES
Quick Note:    NORMAL COMPLETE BLOOD PANEL WBCS RBCS AND PLTS   NORMAL URINE ANALYSIS   GLUCOSE, RENAL AND BLOOD SALTS WITHIN NORMAL LIMITS EXCEPT  BORDERLINE HIGH CALCIUM LEVEL   DROP VITAMIN D TO 2000 UNITS EVERY OTHER DAY   Current Outpatient Prescriptions:  aspirin-acetaminophen-caffeine (EXCEDRIN MIGRAINE) 250-250-65 MG per tablet  acetaminophen (MAPAP) 325 MG tablet  omeprazole (PRILOSEC) 20 MG CR capsule  lidocaine (XYLOCAINE) 5 % ointment  alum & mag hydroxide-simethicone (MYLANTA ES/MAALOX ES) 400-400-40 MG/5ML SUSP suspension  polyethylene glycol (MIRALAX) powder  hydrocortisone (CORTAID) 1 % cream  atorvastatin (LIPITOR) 10 MG tablet  hydrocortisone (ANUSOL-HC) 2.5 % cream  fluticasone (FLONASE) 50 MCG/ACT spray  Cholecalciferol (VITAMIN D3) 2000 UNITS TABS  loratadine (QC LORATADINE ALLERGY RELIEF) 10 MG tablet  aspirin 81 MG chewable tablet  glucosamine-chondroitinoitin ([facility]S GLUCOSAMINE-CHONDROITIN) 500-400 MG TABS    No current facility-administered medications for this visit.  GAYLA VEGA JR., MD       ______

## 2017-01-16 ENCOUNTER — TELEPHONE (OUTPATIENT)
Dept: FAMILY MEDICINE | Facility: CLINIC | Age: 77
End: 2017-01-16

## 2017-01-16 LAB — DEPRECATED CALCIDIOL+CALCIFEROL SERPL-MC: 33 UG/L (ref 20–75)

## 2017-01-16 NOTE — TELEPHONE ENCOUNTER
Reason for Call:  Home Health Care    Brittni with [facility] called regarding (reason for call): Orders    Orders are needed for this patient. Skilled nursing    PT: na    OT: na    Skilled Nursing: Delay start of care until Wed Jan 18th per pt request    Pt Provider: Dr KASSY Yen    Phone Number Homecare Nurse can be reached at: [phone]    Can we leave a detailed message on this number? YES    Phone number patient can be reached at: Home number on file [phone] (home)    Best Time: any    Call taken on 1/16/2017 at 3:41 PM by CAROLINA HERNANDEZ

## 2017-01-16 NOTE — PROGRESS NOTES
Quick Note:    NORMAL VITAMIN D LEVEL   NORMAL COMPLETE BLOOD PANEL WBCS RBCS AND PLTS   NORMAL URINE ANALYSIS     GLUCOSE, RENAL AND BLOOD SALTS  WITHIN NORMAL LIMITS EXCEPT  BORDERLINE  HIGH CALCIUM LEVEL   GAYLA VEGA JR., MD     ______

## 2017-01-18 ENCOUNTER — TELEPHONE (OUTPATIENT)
Dept: FAMILY MEDICINE | Facility: CLINIC | Age: 77
End: 2017-01-18

## 2017-01-18 NOTE — TELEPHONE ENCOUNTER
KALYN Dobbins from [facility] called to report she will start with [facility] tomorrow instead of today. Nurse has arranged for  tomorrow.  Verbal approval given to proceed with [facility] as planned.

## 2017-01-19 ENCOUNTER — TELEPHONE (OUTPATIENT)
Dept: FAMILY MEDICINE | Facility: CLINIC | Age: 77
End: 2017-01-19

## 2017-02-07 ENCOUNTER — OFFICE VISIT (OUTPATIENT)
Dept: FAMILY MEDICINE | Facility: CLINIC | Age: 77
End: 2017-02-07
Payer: COMMERCIAL

## 2017-02-07 VITALS
DIASTOLIC BLOOD PRESSURE: 64 MMHG | RESPIRATION RATE: 16 BRPM | TEMPERATURE: 99.2 F | HEART RATE: 60 BPM | WEIGHT: 165 LBS | SYSTOLIC BLOOD PRESSURE: 124 MMHG | BODY MASS INDEX: 27.9 KG/M2 | OXYGEN SATURATION: 98 %

## 2017-02-07 DIAGNOSIS — M51.369 DEGENERATION OF LUMBAR INTERVERTEBRAL DISC: ICD-10-CM

## 2017-02-07 DIAGNOSIS — M54.2 NECK PAIN: ICD-10-CM

## 2017-02-07 DIAGNOSIS — G44.209 TENSION HEADACHE: Primary | Chronic | ICD-10-CM

## 2017-02-07 DIAGNOSIS — M17.0 PRIMARY OSTEOARTHRITIS OF BOTH KNEES: Chronic | ICD-10-CM

## 2017-02-07 PROCEDURE — 99214 OFFICE O/P EST MOD 30 MIN: CPT | Performed by: FAMILY MEDICINE

## 2017-02-07 NOTE — PATIENT INSTRUCTIONS
RIGHT AND LEFT ROTATION INTO PAIN SITTING UP 30 TIMES TWICE DAILY    RIGHT AND LEFT LATERAL BENDING INTO PAIN 30 TIMES TWICE DAILY    NECK EXTENSION 30 TIME TWICE DAILY    NECK FLEXION  30 TIMES TWICE DAILY    MASSAGE BACK OF NECK MULTIPLE TIMES THROUGHOUT DAY AS TOLERATED    PRONE POSITION NECK ROTATION RIGHT AND LEFT 30 TIMES TWICE DAILY    NECK EXTENSION 30 TIMES TWICE DAILY    ROTATE NECK IN [place] RIGHTWARD AND LEFT VALERO    AVOID ANY EXERCISE WHICH RESULTS IN SHOOTING NERVE PAIN DOWN THE ARM OR SHOULDER BLADE  SHOULDER RAISE    Assessment: Lower back pain    Plan: do these exercises at least twice daily:  Standing or sitting exercise can be done every two hours    Dallin' Flexion Versus Elizabeth   Extension Exercises For   Low Back Pain   Examples of Dallin' Flexion Exercises  1. Pelvic tilt.  Please press the small of your back against the floor.  Start with 5-10  and increase to 100 count over one month   2. Single Knee to chest. Lie on your back with legs in bent position. Alternate one leg and the other very slowly bringing the knee to chest.  Start with a count of 5-10   Over one month work up to 100  3. Double knee to chest.  Lie on your back with knees in bent position.  Bring both knees to the chest slowly hold for a count of 5-to 10. Over one month work to 100  4. Partial sit-up or crunch.  Lie on your back in bent leg position.  Please bring your body with arms crossed in front  To 30 degrees of flexion. Start with 5-10 over one month work up to 100 or more  5. Sit back.  Please sit on the side of the bed or a stair landing  And lie backwards until the abdominal muscles start to quiver.  Hold for a count of 5-10 and over a month work up to 100.  5. Hamstring stretch.  Please extend your legs while sitting on the floor as tolerated for 5-10 count.  Gradually increase to count of 30 over one month      Alternately find a stair landing or sturdy chair and place heel  In a comfortable level of  extension  And stretch one hamstring at a time for 5-10 seconds.  Increase to count of 30 over one month                         Squat.  Stand with legs comfortably apart and lower the body slowly by flexing the knees  for count of 5-10 over one month increase to 30.  Useful for anterior disc protrusion, facette joint arthritis spond-10ylolysis, spondylolisthesis and spinal stenosis

## 2017-02-07 NOTE — MR AVS SNAPSHOT
After Visit Summary   2/7/2017    Rody Gonzalez    MRN: [id number]           Patient Information     Date Of Birth          1940        Visit Information        Provider Department      2/7/2017 2:00 PM Osman Yen MD; [facility] [employer] [facility]        Today's Diagnoses     Tension headache    -  1     Primary osteoarthritis of both knees         Degeneration of lumbar intervertebral disc         Neck pain           Care Instructions      RIGHT AND LEFT ROTATION INTO PAIN SITTING UP 30 TIMES TWICE DAILY    RIGHT AND LEFT LATERAL BENDING INTO PAIN 30 TIMES TWICE DAILY    NECK EXTENSION 30 TIME TWICE DAILY    NECK FLEXION  30 TIMES TWICE DAILY    MASSAGE BACK OF NECK MULTIPLE TIMES THROUGHOUT DAY AS TOLERATED    PRONE POSITION NECK ROTATION RIGHT AND LEFT 30 TIMES TWICE DAILY    NECK EXTENSION 30 TIMES TWICE DAILY    ROTATE NECK IN [place] RIGHTWARD AND LEFT VALERO    AVOID ANY EXERCISE WHICH RESULTS IN SHOOTING NERVE PAIN DOWN THE ARM OR SHOULDER BLADE  SHOULDER RAISE    Assessment: Lower back pain    Plan: do these exercises at least twice daily:  Standing or sitting exercise can be done every two hours    Dallin' Flexion Versus [place]   Extension Exercises For   Low Back Pain   Examples of Dallin' Flexion Exercises  1. Pelvic tilt.  Please press the small of your back against the floor.  Start with 5-10  and increase to 100 count over one month   2. Single Knee to chest. Lie on your back with legs in bent position. Alternate one leg and the other very slowly bringing the knee to chest.  Start with a count of 5-10   Over one month work up to 100  3. Double knee to chest.  Lie on your back with knees in bent position.  Bring both knees to the chest slowly hold for a count of 5-to 10. Over one month work to 100  4. Partial sit-up or crunch.  Lie on your back in bent leg position.  Please bring your body with arms crossed in front  To  30 degrees of flexion. Start with 5-10 over one month work up to 100 or more  5. Sit back.  Please sit on the side of the bed or a stair landing  And lie backwards until the abdominal muscles start to quiver.  Hold for a count of 5-10 and over a month work up to 100.  5. Hamstring stretch.  Please extend your legs while sitting on the floor as tolerated for 5-10 count.  Gradually increase to count of 30 over one month      Alternately find a stair landing or sturdy chair and place heel  In a comfortable level of extension  And stretch one hamstring at a time for 5-10 seconds.  Increase to count of 30 over one month                         Squat.  Stand with legs comfortably apart and lower the body slowly by flexing the knees  for count of 5-10 over one month increase to 30.  Useful for anterior disc protrusion, facette joint arthritis spond-10ylolysis, spondylolisthesis and spinal stenosis                      Follow-ups after your visit        Additional Services     ORTHO  REFERRAL       [facility] is referring you to the Orthopedic  Services at [facility].       The  Representative will assist you in the coordination of your Orthopedic and Musculoskeletal Care as prescribed by your physician.    The  Representative will call you within 1 business day to help schedule your appointment, or you may contact the  Representative at:    All areas ~ [phone]   (B63.133) Tension headache  (primary encounter diagnosis)    (M17.0) Primary osteoarthritis of both knees    (M51.36) Degeneration of lumbar intervertebral disc    (M54.2) Neck pain      Type of Referral : Non Surgical       Timeframe requested: Routine        Coverage of these services is subject to the terms and limitations of your health insurance plan.  Please call member services at your health plan with any benefit or coverage questions.      If X-rays, CT or MRI's have  "been performed, please contact the facility where they were done to arrange for , prior to your scheduled appointment.  Please bring this referral request to your appointment and present it to your specialist.                  Who to contact     If you have questions or need follow up information about today's clinic visit or your schedule please contact [facility] directly at [phone].  Normal or non-critical lab and imaging results will be communicated to you by MyChart, letter or phone within 4 business days after the clinic has received the results. If you do not hear from us within 7 days, please contact the clinic through MyChart or phone. If you have a critical or abnormal lab result, we will notify you by phone as soon as possible.  Submit refill requests through [employer] or call your pharmacy and they will forward the refill request to us. Please allow 3 business days for your refill to be completed.          Additional Information About Your Visit        [employer] Information     [employer] lets you send messages to your doctor, view your test results, renew your prescriptions, schedule appointments and more. To sign up, go to [email or web address] . Click on \"Log in\" on the left side of the screen, which will take you to the Welcome page. Then click on \"Sign up Now\" on the right side of the page.     You will be asked to enter the access code listed below, as well as some personal information. Please follow the directions to create your username and password.     Your access code is: [email or web address]  Expires: 2017  3:03 PM     Your access code will  in 90 days. If you need help or a new code, please call your [facility] clinic or [phone].        Care EveryWhere ID     This is your Care EveryWhere ID. This could be used by other organizations to access your [facility] medical records  [id number]        Your Vitals Were     Pulse Temperature Respirations " Pulse Oximetry          60 99.2  F (37.3  C) (Tympanic) 16 98%         Blood Pressure from Last 3 Encounters:   02/07/17 124/64   01/12/17 130/68   09/09/16 146/68    Weight from Last 3 Encounters:   02/07/17 165 lb (74.844 kg)   01/12/17 164 lb (74.39 kg)   09/09/16 162 lb (73.483 kg)              We Performed the Following     ORTHO  REFERRAL        Primary Care Provider Office Phone # Fax #    Osman Yen [phone]       [facility] [address]  [facility] [address]        Thank you!     Thank you for choosing [facility]  for your care. Our goal is always to provide you with excellent care. Hearing back from our patients is one way we can continue to improve our services. Please take a few minutes to complete the written survey that you may receive in the mail after your visit with us. Thank you!             Your Updated Medication List - Protect others around you: Learn how to safely use, store and throw away your medicines at www.disposemymeds.org.          This list is accurate as of: 2/7/17  2:32 PM.  Always use your most recent med list.                   Brand Name Dispense Instructions for use    acetaminophen 325 MG tablet    MAPAP    180 tablet    Take 1-2 tablets (325-650 mg) by mouth every 6 hours as needed for mild pain       alum & mag hydroxide-simethicone 400-400-40 MG/5ML Susp suspension    MYLANTA ES/MAALOX  ES    1 Bottle    Take 30 mLs by mouth 4 times daily as needed for indigestion       aspirin 81 MG chewable tablet     108 tablet    Take 1 tablet (81 mg) by mouth daily       aspirin-acetaminophen-caffeine 250-250-65 MG per tablet    EXCEDRIN MIGRAINE    80 tablet    Take 1 tablet by mouth every 6 hours as needed for headaches       atorvastatin 10 MG tablet    LIPITOR    30 tablet    Take 1 tablet (10 mg) by mouth daily       fluticasone 50 MCG/ACT spray    FLONASE    16 g    Spray 1 spray into  both nostrils daily       glucosamine-chondroitinoitin 500-400 MG Tabs    [employer] GLUCOSAMINE-CHONDROITIN    120 tablet    Take 2 tablets by mouth 2 times daily       hydrocortisone 1 % cream    CORTAID    30 g    Apply sparingly to affected area three times daily for 14 days.       hydrocortisone 2.5 % cream    ANUSOL-HC    30 g    Place rectally 2 times daily       lidocaine 5 % ointment    XYLOCAINE    142 g    One application 4 x daily to affected areas lower back and knees if necessary Profile Rx: patient will contact pharmacy when needed       loratadine 10 MG tablet    QC LORATADINE ALLERGY RELIEF    30 tablet    Take 1 tablet (10 mg) by mouth daily as needed       omeprazole 20 MG CR capsule    priLOSEC    30 capsule    Take 1 capsule (20 mg) by mouth daily       polyethylene glycol powder    MIRALAX    510 g    Take 17 g (1 capful) by mouth daily       Vitamin D3 2000 UNITS Tabs     60 tablet    Take 2 tablets by mouth daily (with breakfast)

## 2017-02-07 NOTE — PROGRESS NOTES
SUBJECTIVE:                                                    Rody Gonzalez is a 77 year old female who presents to clinic today for the following health issues:  Health Maintenance Due   Topic Date Due     PNEUMOCOCCAL (2 of 2 - PCV13) 09/01/2007     INFLUENZA VACCINE (SYSTEM ASSIGNED)  09/01/2016     TETANUS IMMUNIZATION (SYSTEM ASSIGNED)  09/01/2016     Health Maintenance reviewed at today's visit patient asked to schedule/complete:   Immunizations:  Patient declined    Lab results      Duration: 1/16/2017    Description (location/character/radiation): lab tests done    Intensity:  na    Accompanying signs and symptoms: na    History (similar episodes/previous evaluation): None    Precipitating or alleviating factors: None    Therapies tried and outcome: None     .GAYLA VEGA MD .2/7/2017 2:33 PM .February 7, 2017    Rody Gonzalez is a 77 year old female who is who presents with NECK PAIN    SHOULDER PAIN   UPPER AND LOWER BACK PAIN   OSTEOARTHRITIS KNEES   DIFFICULT GAIT   HEADACHES RELATED TO NECK ARTHRITIS   Onset : MANY YEARS    Severity: MODERATE     Home treatments ONGOING   Additional Symptoms: ONGOING   Course  SLOWLY WORSEINING        .  Current Outpatient Prescriptions   Medication Sig Dispense Refill     acetaminophen (MAPAP) 325 MG tablet Take 1-2 tablets (325-650 mg) by mouth every 6 hours as needed for mild pain 180 tablet 11     omeprazole (PRILOSEC) 20 MG CR capsule Take 1 capsule (20 mg) by mouth daily 30 capsule 11     lidocaine (XYLOCAINE) 5 % ointment One application 4 x daily to affected areas lower back and knees if necessary  Profile Rx: patient will contact pharmacy when needed 142 g 11     alum & mag hydroxide-simethicone (MYLANTA ES/MAALOX  ES) 400-400-40 MG/5ML SUSP suspension Take 30 mLs by mouth 4 times daily as needed for indigestion 1 Bottle 11     polyethylene glycol (MIRALAX) powder Take 17 g (1 capful) by mouth daily 510 g 1     hydrocortisone (CORTAID) 1 % cream Apply  sparingly to affected area three times daily for 14 days. 30 g 5     atorvastatin (LIPITOR) 10 MG tablet Take 1 tablet (10 mg) by mouth daily 30 tablet 11     hydrocortisone (ANUSOL-HC) 2.5 % cream Place rectally 2 times daily 30 g 11     fluticasone (FLONASE) 50 MCG/ACT spray Spray 1 spray into both nostrils daily 16 g 11     Cholecalciferol (VITAMIN D3) 2000 UNITS TABS Take 2 tablets by mouth daily (with breakfast) 60 tablet 11     loratadine (QC LORATADINE ALLERGY RELIEF) 10 MG tablet Take 1 tablet (10 mg) by mouth daily as needed 30 tablet 11     aspirin 81 MG chewable tablet Take 1 tablet (81 mg) by mouth daily 108 tablet 3     glucosamine-chondroitinoitin ([facility] GLUCOSAMINE-CHONDROITIN) 500-400 MG TABS Take 2 tablets by mouth 2 times daily 120 tablet 11     aspirin-acetaminophen-caffeine (EXCEDRIN MIGRAINE) 250-250-65 MG per tablet Take 1 tablet by mouth every 6 hours as needed for headaches 80 tablet 11        No Known Allergies    Immunization History   Administered Date(s) Administered     Pneumococcal 23 valent 2006     Tdap (Adacel,Boostrix) 2006         reports that she does not drink alcohol.      reports that she does not use illicit drugs.    family history includes Family History Negative in her father and mother.    indicated that her mother is . She indicated that her father is .      has past surgical history that includes Hysterectomy ().     reports that she does not currently engage in sexual activity.  .  Pediatric History   Patient Guardian Status     Not on file.     Other Topics Concern     Parent/Sibling W/ Cabg, Mi Or Angioplasty Before 65f 55m? No     Social History Narrative         reports that she has never smoked. She has never used smokeless tobacco.    Medical, social, surgical, and family histories reviewed.    Problem list, Medication list, Allergies, and Medical/Social/Surgical histories reviewed in [facility] and updated as appropriate.  Labs reviewed in  EP[facility]  Patient Active Problem List   Diagnosis     Health Care Home     Gastroesophageal reflux disease without esophagitis     DDD (degenerative disc disease), lumbar     Dysuria     Anxiety state     Urinary frequency     Tuberculosis of peripheral lymph nodes     Nonspecific abnormal results of thyroid function study     Pulmonary tuberculosis     PPD positive     Papanicolaou smear of cervix with atypical squamous cells of undetermined significance (ASC-US)     Osteoporosis     Tear film insufficiency     Memory loss     Headache     Abdominal pain     Closed fracture of triquetral (cuneiform) bone of wrist     Other chronic pain     Abnormality of gait     Hearing loss     Malaise and fatigue     Acute cystitis     Major depression in complete remission (H)     Sensory hearing loss, unilateral     Vitamin D deficiency disease     Hyperlipidemia LDL goal <160     Overweight (BMI 25.0-29.9)     Risk for falls     ACP (advance care planning)     Primary osteoarthritis of both knees     Tension headache     Past Surgical History   Procedure Laterality Date     Hysterectomy  2004       Social History   Substance Use Topics     Smoking status: Never Smoker      Smokeless tobacco: Never Used     Alcohol Use: No     Family History   Problem Relation Age of Onset     Family History Negative Mother      Family History Negative Father          No Known Allergies  Recent Labs   Lab Test  01/12/17   1510  04/13/16   0943  12/04/15   1530  04/10/15   1257  03/04/14   1050  09/13/13   1125   LDL   --   126*  125*  116  134*  120   HDL   --   41*  53  42*  42*  41*   TRIG   --   131  100  77  84  99   ALT   --    --   30  44  22   --    CR  0.63  0.70   --   0.80  0.70  0.70   GFRESTIMATED  >90  Non  GFR Calc    81   --   70  82  82   GFRESTBLACK  >90   GFR Calc    >90   --   85  >90  >90   POTASSIUM  4.2  4.0   --   4.7  4.5  4.4   TSH   --    --    --    --   1.47  1.86        BP Readings from  Last 6 Encounters:   02/07/17 124/64   01/12/17 130/68   09/09/16 146/68   05/13/16 132/78   04/07/16 138/62   12/18/15 128/74       Wt Readings from Last 3 Encounters:   02/07/17 165 lb (74.844 kg)   01/12/17 164 lb (74.39 kg)   09/09/16 162 lb (73.483 kg)         Positive symptoms or findings indicated by bold designation:     ROS: 10 point ROS neg other than the symptoms noted above in the HPI.except  has Health Care Home; Gastroesophageal reflux disease without esophagitis; DDD (degenerative disc disease), lumbar; Dysuria; Anxiety state; Urinary frequency; Tuberculosis of peripheral lymph nodes; Nonspecific abnormal results of thyroid function study; Pulmonary tuberculosis; PPD positive; Papanicolaou smear of cervix with atypical squamous cells of undetermined significance (ASC-US); Osteoporosis; Tear film insufficiency; Memory loss; Headache; Abdominal pain; Closed fracture of triquetral (cuneiform) bone of wrist; Other chronic pain; Abnormality of gait; Hearing loss; Malaise and fatigue; Acute cystitis; Major depression in complete remission (H); Sensory hearing loss, unilateral; Vitamin D deficiency disease; Hyperlipidemia LDL goal <160; Overweight (BMI 25.0-29.9); Risk for falls; ACP (advance care planning); Primary osteoarthritis of both knees; and Tension headache on her problem list.   Constitutional: The patient denied fatigue, fever, insomnia, night sweats, recent illness and weight loss.  ONGOIN     Eyes: The patient denied blindness, eye pain, eye tearing, photophobia, vision change and visual disturbance. NORMAL       Ears/Nose/Throat/Neck: The patient denied dizziness, facial pain, hearing loss, nasal discharge, oral pain, otalgia, postnasal drip, sinus congestion, sore throat, tinnitus and voice change.   NORMAL     Cardiovascular: The patient denied arrhythmia, chest pain/pressure, claudication, edema, exercise intolerance, fatigue, orthopnea, palpitations and syncope.      Respiratory: The  patient denied asthma, chest congestion, cough, dyspnea on exertion, dyspnea/shortness of breath, hemoptysis, pedal edema, pleuritic pain, productive sputum, snoring and wheezing.NL     Gastrointestinal: The patient denied abdominal pain, anorexia, constipation, diarrhea, dysphagia, gastroesophageal reflux, hematochezia, hemorrhoids, melena, nausea and vomiting . NORMAL     Genitourinary/Nephrology: The patient denied breast complaint, dysuria, nocturia sexual dysfunction, t, urinary frequency, urinary incontinence, urinary urgency   NORMAL     Musculoskeletal: The patient denied arthralgia(s), back pain, joint complaint, muscle weakness, myalgias, osteoporosis, sciatica, stiffness and swelling.  NORMAL      Dermatoligic:: The patient denied acne, dermatitis, ecchymosis, itching, mole change, rash, skin cancer, skin lesion and sores. SEE HISTORY OF PRESENT ILLNESS     Neurologic: The patient denied dizziness, gait abnormality, headache, memory loss, mental status change, paresis, paresthesia, seizure, syncope, tremor and vision change. NORMAL L    Psychiatric: The patient denied anxiety, depression, disturbances of memory, drug abuse, insomnia, mood swings and relationship difficulties. NORMAL      Endocrine: The patient denied , goiter, obesity, polyuria and thyroid disease. NORMAL     Hematologic/Lymphatic: The patient denied abnormal bleeding and bruising, abnormal ecchymoses, anemia, lymph node enlargement/mass, petechiae and venous  Thrombosis.  NORMAL     Allergy/Immunology: The patient denied food allergy and  Allergic rhinitis or conjunctivitis.  NORMAL       PE:  /64 mmHg  Pulse 60  Temp(Src) 99.2  F (37.3  C) (Tympanic)  Resp 16  Wt 165 lb (74.844 kg)  SpO2 98% Body mass index is 27.9 kg/(m^2).    Constitutional: general appearance, well nourished, well developed, in no acute distress, well developed, appears stated age, normal body habitus,  NORMAL     Eyes:; The patient has normal eyelids  sclerae and conjunctivae : NORMAL      Ears/Nose/Throat: external ear, overall: normal appearance; external nose, overall: benign appearance, normal moujth gums and lips  The patient has: NORMAL     Neck: thyroid, overall: normal size, normal consistency, nontender, NORMAL     Respiratory:  palpation of chest, overall: normal excursion,  NL  Clear to percussion and auscultation      Tachypnea NORMAL  Color NORMAL     Cardiovascular:  Good color with no peripheral edema NORMAL   Regular sinus rhythm without murmur. Physiologic heart sounds Heart is unelarged  .   Chest/Breast: normal shape  NORMAL      Abdominal exam,  Liver and spleen are  unenlarged  NL      Tenderness  L  Scars NORMAL     Urogenital; no renal, flank or bladder  tenderness;  NL    Lymphatic: neck nodes,  NORMAL    Other nodes NOT APPLICABLE     Musculoskeletal: WITH ANTALGIC GAIT   DECREASE RANGE OF MOTION OF BACK   FULL RANGE OF MOTION SHOULDER   NEGATIVE STRAIGHT LEG RAISING TEST     Integument: inspection of skin, no rash, lesions; and, palpation, no induration, no tenderness. OSTEOARTHRITIS KNEES        Neurologic mental status, overall: alert and oriented; gait, no ataxia, no unsteadiness; coordination, no tremors; cranial nerves, overall: normal motor, overall: normal bulk, tone.      Psychiatric: orientation/consciousness, overall: oriented to person, place and time; behavior/psychomotor activity, no tics, normal psychomotor activity; mood and affect, overall: normal mood and affect; appearance, overall: well-groomed, good eye contact; speech, overall: normal quality, no aphasia and normal quality, quantity, intact.      Diagnostic Test Results:  Results for orders placed or performed in visit on 01/12/17   Basic metabolic panel   Result Value Ref Range    Sodium 139 133 - 144 mmol/L    Potassium 4.2 3.4 - 5.3 mmol/L    Chloride 109 94 - 109 mmol/L    Carbon Dioxide 22 20 - 32 mmol/L    Anion Gap 8 3 - 14 mmol/L    Glucose 92 70 - 99 mg/dL     Urea Nitrogen 17 7 - 30 mg/dL    Creatinine 0.63 0.52 - 1.04 mg/dL    GFR Estimate >90  Non  GFR Calc   >60 mL/min/1.7m2    GFR Estimate If Black >90   GFR Calc   >60 mL/min/1.7m2    Calcium 10.5 (H) 8.5 - 10.1 mg/dL   CBC with platelets   Result Value Ref Range    WBC 6.2 4.0 - 11.0 10e9/L    RBC Count 4.31 3.8 - 5.2 10e12/L    Hemoglobin 13.1 11.7 - 15.7 g/dL    Hematocrit 39.4 35.0 - 47.0 %    MCV 91 78 - 100 fl    MCH 30.4 26.5 - 33.0 pg    MCHC 33.2 31.5 - 36.5 g/dL    RDW 13.1 10.0 - 15.0 %    Platelet Count 163 150 - 450 10e9/L   Vitamin D Deficiency   Result Value Ref Range    Vitamin D Deficiency screening 33 20 - 75 ug/L   UA reflex to Microscopic and Culture   Result Value Ref Range    Color Urine Yellow     Appearance Urine Clear     Glucose Urine Negative NEG mg/dL    Bilirubin Urine Negative NEG    Ketones Urine Negative NEG mg/dL    Specific Gravity Urine 1.015 1.003 - 1.035    Blood Urine Negative NEG    pH Urine 5.5 5.0 - 7.0 pH    Protein Albumin Urine Negative NEG mg/dL    Urobilinogen Urine 1.0 0.2 - 1.0 EU/dL    Nitrite Urine Negative NEG    Leukocyte Esterase Urine Negative NEG    Source Midstream Urine          ICD-10-CM    1. Tension headache G44.209 ORTHO  REFERRAL   2. Primary osteoarthritis of both knees M17.0 ORTHO  REFERRAL   3. Degeneration of lumbar intervertebral disc M51.36 ORTHO  REFERRAL   4. Neck pain M54.2 ORTHO  REFERRAL        .    Side effects benefits and risks thoroughly discussed. .she may come in early if unimproved or getting worse          Importance of adhering to regimen discussed and if medications were dispensed, the importance of taking medications discussed and bringing in the medications after every visit for chronic problems         Please drink 2 glasses of water prior to meals and walk 15-30 minutes after meals    I spent  25 MINUTES SPENT  with patient discussing the following issues    The  primary encounter diagnosis was Tension headache. Diagnoses of Primary osteoarthritis of both knees, Degeneration of lumbar intervertebral disc, and Neck pain were also pertinent to this visit. over half of which involved counseling and coordination of care.    Patient Instructions     RIGHT AND LEFT ROTATION INTO PAIN SITTING UP 30 TIMES TWICE DAILY    RIGHT AND LEFT LATERAL BENDING INTO PAIN 30 TIMES TWICE DAILY    NECK EXTENSION 30 TIME TWICE DAILY    NECK FLEXION  30 TIMES TWICE DAILY    MASSAGE BACK OF NECK MULTIPLE TIMES THROUGHOUT DAY AS TOLERATED    PRONE POSITION NECK ROTATION RIGHT AND LEFT 30 TIMES TWICE DAILY    NECK EXTENSION 30 TIMES TWICE DAILY    ROTATE NECK IN [place] RIGHTWARD AND LEFT VALERO    AVOID ANY EXERCISE WHICH RESULTS IN SHOOTING NERVE PAIN DOWN THE ARM OR SHOULDER BLADE  SHOULDER RAISE    Assessment: Lower back pain    Plan: do these exercises at least twice daily:  Standing or sitting exercise can be done every two hours    Dallin' Flexion Versus Elizabeth   Extension Exercises For   Low Back Pain   Examples of Dallin' Flexion Exercises  1. Pelvic tilt.  Please press the small of your back against the floor.  Start with 5-10  and increase to 100 count over one month   2. Single Knee to chest. Lie on your back with legs in bent position. Alternate one leg and the other very slowly bringing the knee to chest.  Start with a count of 5-10   Over one month work up to 100  3. Double knee to chest.  Lie on your back with knees in bent position.  Bring both knees to the chest slowly hold for a count of 5-to 10. Over one month work to 100  4. Partial sit-up or crunch.  Lie on your back in bent leg position.  Please bring your body with arms crossed in front  To 30 degrees of flexion. Start with 5-10 over one month work up to 100 or more  5. Sit back.  Please sit on the side of the bed or a stair landing  And lie backwards until the abdominal muscles start to quiver.  Hold for a count of 5-10 and  over a month work up to 100.  5. Hamstring stretch.  Please extend your legs while sitting on the floor as tolerated for 5-10 count.  Gradually increase to count of 30 over one month      Alternately find a stair landing or sturdy chair and place heel  In a comfortable level of extension  And stretch one hamstring at a time for 5-10 seconds.  Increase to count of 30 over one month                         Squat.  Stand with legs comfortably apart and lower the body slowly by flexing the knees  for count of 5-10 over one month increase to 30.  Useful for anterior disc protrusion, facette joint arthritis spond-10ylolysis, spondylolisthesis and spinal stenosis                    Diet:  MEDITERRANEAN DIET     Exercise:  RANGE OF MOTION NECK BACK AND SHOULDERS OSTEOARTHRITIS KNEE PAIN   Exercises Range of motion, balance, isometric, and strengthening exercises 30 repetitions twice daily of involved joints      .GAYLA VEGA MD 2/7/2017 2:33 PM  February 7, 2017

## 2017-02-07 NOTE — NURSING NOTE
"Chief Complaint   Patient presents with     Lab Result Notice       Initial /64 mmHg  Pulse 60  Temp(Src) 99.2  F (37.3  C) (Tympanic)  Resp 16  Wt 165 lb (74.844 kg)  SpO2 98% Estimated body mass index is 27.9 kg/(m^2) as calculated from the following:    Height as of 1/12/17: 5' 4.5\" (1.638 m).    Weight as of this encounter: 165 lb (74.844 kg).  Medication Reconciliation: complete   Janet Lunsford CMA      "

## 2017-02-08 ENCOUNTER — CARE COORDINATION (OUTPATIENT)
Dept: GERIATRIC MEDICINE | Facility: CLINIC | Age: 77
End: 2017-02-08

## 2017-02-08 ASSESSMENT — PATIENT HEALTH QUESTIONNAIRE - PHQ9: SUM OF ALL RESPONSES TO PHQ QUESTIONS 1-9: 0

## 2017-02-08 NOTE — PROGRESS NOTES
Placed a call to member and arranged a HV for 02/22/17.  Johnna Malin RN N  [facility]   Phone:   [phone]  Fax:       [phone]

## 2017-02-22 NOTE — PROGRESS NOTES
Spoke to member' s daughter today and rescheduled HV to tomorrow, 02/23/17.  Johnna Malin RN N  [facility]   Phone:   [phone]  Fax:       [phone]

## 2017-02-23 ENCOUNTER — CARE COORDINATION (OUTPATIENT)
Dept: GERIATRIC MEDICINE | Facility: CLINIC | Age: 77
End: 2017-02-23

## 2017-02-23 NOTE — PROGRESS NOTES
Home visit/Gamaliel Risk Assessment/EW screening completed on: 02/23/2017  Member is a 77 year old female [facility].    Member has strong support from daughter who also lives in [place], [place]. Member's diagnosis includes primary osteoarthritis of both knees, degeneration of lumbar intervertebral disc and neck pain  Other diagnosis include abnormality of gait, osteoporosis, tension HA, dysuria, chronic history of constipation, anxiety and GERD.  Member also reports history of chronic constipation.  Member reports no recent ED visits and/or hospitalization.  Member's last PCP clinic visit was on 02/08/17.  Member currently receiving the following services: PCA , ADC with ADC transportation and homemaking.  See EMR for a list of client's diagnoses and medications.   Medication management: Medications reviewed:Yes. Medication management: Family assists with medication. Medication understanding: Family assists with medication. MTM offered:  N/A.  Member Mood/behavior-PHQ9 score: 8 Any needs to f/u with PCP on PHQ9 score:  N/A  Plan of Care: This CM recommends that member continue current POC including PCA, ADC with ADC transportation and homemaking.  Follow-Up Plan: Member informed of future contact, plan to f/u with member with a 6 month telephone assessment.  Contact information shared with member and family, encouraged member to call with any questions or concerns prior to this.  See [facility] for further detailed information.  ASIM Collins  [facility]   Phone:   [phone]  Fax:       [phone]

## 2017-02-24 ASSESSMENT — PATIENT HEALTH QUESTIONNAIRE - PHQ9: SUM OF ALL RESPONSES TO PHQ QUESTIONS 1-9: 8

## 2017-03-08 ENCOUNTER — CARE COORDINATION (OUTPATIENT)
Dept: GERIATRIC MEDICINE | Facility: CLINIC | Age: 77
End: 2017-03-08

## 2017-03-08 NOTE — PROGRESS NOTES
Emailed completed PCA assessment to [facility].  Faxed copy of PCA assessment to [facility] and mailed copy to member.  Faxed MD Communication to PCP.   Marjorie Epstein  Case Management Specialist  [facility]  [phone]

## 2017-03-15 ENCOUNTER — CARE COORDINATION (OUTPATIENT)
Dept: GERIATRIC MEDICINE | Facility: CLINIC | Age: 77
End: 2017-03-15

## 2017-03-31 ENCOUNTER — OFFICE VISIT (OUTPATIENT)
Dept: FAMILY MEDICINE | Facility: CLINIC | Age: 77
End: 2017-03-31
Payer: COMMERCIAL

## 2017-03-31 VITALS
DIASTOLIC BLOOD PRESSURE: 80 MMHG | WEIGHT: 161 LBS | TEMPERATURE: 98.6 F | BODY MASS INDEX: 27.21 KG/M2 | HEART RATE: 62 BPM | SYSTOLIC BLOOD PRESSURE: 144 MMHG | OXYGEN SATURATION: 98 % | RESPIRATION RATE: 16 BRPM

## 2017-03-31 DIAGNOSIS — G44.219 EPISODIC TENSION-TYPE HEADACHE, NOT INTRACTABLE: ICD-10-CM

## 2017-03-31 DIAGNOSIS — E78.5 HYPERLIPIDEMIA LDL GOAL <160: Chronic | ICD-10-CM

## 2017-03-31 DIAGNOSIS — I10 ESSENTIAL HYPERTENSION, BENIGN: Primary | ICD-10-CM

## 2017-03-31 DIAGNOSIS — H54.7 POOR VISION: ICD-10-CM

## 2017-03-31 DIAGNOSIS — H04.123 DRY EYE SYNDROME, BILATERAL: ICD-10-CM

## 2017-03-31 PROCEDURE — 99214 OFFICE O/P EST MOD 30 MIN: CPT | Performed by: FAMILY MEDICINE

## 2017-03-31 RX ORDER — METOPROLOL SUCCINATE 25 MG/1
12.5 TABLET, EXTENDED RELEASE ORAL DAILY
Qty: 45 TABLET | Refills: 3 | Status: SHIPPED | OUTPATIENT
Start: 2017-03-31 | End: 2017-03-31

## 2017-03-31 RX ORDER — METOPROLOL SUCCINATE 25 MG/1
12.5 TABLET, EXTENDED RELEASE ORAL AT BEDTIME
Qty: 45 TABLET | Refills: 3 | Status: SHIPPED | OUTPATIENT
Start: 2017-03-31 | End: 2017-09-14

## 2017-03-31 NOTE — NURSING NOTE
"Chief Complaint   Patient presents with     Hypertension       Initial /80 (BP Location: Left arm, Cuff Size: Adult Regular)  Pulse 62  Temp 98.6  F (37  C) (Tympanic)  Resp 16  Wt 161 lb (73 kg)  SpO2 98%  BMI 27.21 kg/m2 Estimated body mass index is 27.21 kg/(m^2) as calculated from the following:    Height as of 1/12/17: 5' 4.5\" (1.638 m).    Weight as of this encounter: 161 lb (73 kg).  Medication Reconciliation: complete   Janet Lunsford CMA    "

## 2017-03-31 NOTE — MR AVS SNAPSHOT
After Visit Summary   3/31/2017    oRdy Gonzalez    MRN: [id number]           Patient Information     Date Of Birth          1940        Visit Information        Provider Department      3/31/2017 2:30 PM Osman Yen MD; [employer] [facility]        Today's Diagnoses     Dry eye syndrome, bilateral    -  1    Poor vision        Episodic tension-type headache, not intractable          Care Instructions    /80 (BP Location: Left arm, Cuff Size: Adult Regular)  Pulse 62  Temp 98.6  F (37  C) (Tympanic)  Resp 16  Wt 161 lb (73 kg)  SpO2 98%  BMI 27.21 kg/m2   (H04.123) Dry eye syndrome, bilateral  (primary encounter diagnosis)  Comment:    Plan: OPHTHALMOLOGY ADULT REFERRAL             (H54.7) Poor vision  Comment:    Plan: OPHTHALMOLOGY ADULT REFERRAL             (G44.219) Episodic tension-type headache, not intractable  Comment:    Plan: metoprolol (TOPROL-XL) 25 MG 24 hr tablet,         DISCONTINUED: metoprolol (TOPROL-XL) 25 MG 24         hr tablet                         Follow-ups after your visit        Additional Services     OPHTHALMOLOGY ADULT REFERRAL       Your provider has referred you to: N: [facility] - [place] [phone] [email or web address]  SIERRA HUSSEIN MD   WORSENING VISION AND DRY EYES     Please be aware that coverage of these services is subject to the terms and limitations of your health insurance plan.  Call member services at your health plan with any benefit or coverage questions.      Please bring the following with you to your appointment:    (1) Any X-Rays, CTs or MRIs which have been performed.  Contact the facility where they were done to arrange for  prior to your scheduled appointment.    (2) List of current medications  (3) This referral request   (4) Any documents/labs given to you for this referral                  Who to contact     If you  "have questions or need follow up information about today's clinic visit or your schedule please contact [facility] directly at [phone].  Normal or non-critical lab and imaging results will be communicated to you by MyChart, letter or phone within 4 business days after the clinic has received the results. If you do not hear from us within 7 days, please contact the clinic through [facility] or phone. If you have a critical or abnormal lab result, we will notify you by phone as soon as possible.  Submit refill requests through [employer] or call your pharmacy and they will forward the refill request to us. Please allow 3 business days for your refill to be completed.          Additional Information About Your Visit        [facility] Information     [employer] lets you send messages to your doctor, view your test results, renew your prescriptions, schedule appointments and more. To sign up, go to [email or web address] . Click on \"Log in\" on the left side of the screen, which will take you to the Welcome page. Then click on \"Sign up Now\" on the right side of the page.     You will be asked to enter the access code listed below, as well as some personal information. Please follow the directions to create your username and password.     Your access code is: [email or web address]  Expires: 2017  4:03 PM     Your access code will  in 90 days. If you need help or a new code, please call your [facility] clinic or [phone].        Care EveryWhere ID     This is your Care EveryWhere ID. This could be used by other organizations to access your [facility] medical records  [id number]        Your Vitals Were     Pulse Temperature Respirations Pulse Oximetry BMI (Body Mass Index)       62 98.6  F (37  C) (Tympanic) 16 98% 27.21 kg/m2        Blood Pressure from Last 3 Encounters:   17 144/80   17 124/64   17 130/68    Weight from Last 3 Encounters:   17 161 lb (73 kg) "   02/07/17 165 lb (74.8 kg)   01/12/17 164 lb (74.4 kg)              We Performed the Following     OPHTHALMOLOGY ADULT REFERRAL          Today's Medication Changes          These changes are accurate as of: 3/31/17  2:58 PM.  If you have any questions, ask your nurse or doctor.               Start taking these medicines.        Dose/Directions    metoprolol 25 MG 24 hr tablet   Commonly known as:  TOPROL-XL   Used for:  Episodic tension-type headache, not intractable   Started by:  Osman Yen MD        Dose:  12.5 mg   Take 0.5 tablets (12.5 mg) by mouth At Bedtime   Quantity:  45 tablet   Refills:  3            Where to get your medicines      These medications were sent to [facility] - [place], [place] - [address] [place] [address] [address] [place] [address]     Phone:  [phone]     metoprolol 25 MG 24 hr tablet                Primary Care Provider Office Phone # Fax #    Osman Yen [phone]       [facility] [address] [address] [facility] [address]        Thank you!     Thank you for choosing [facility]  for your care. Our goal is always to provide you with excellent care. Hearing back from our patients is one way we can continue to improve our services. Please take a few minutes to complete the written survey that you may receive in the mail after your visit with us. Thank you!             Your Updated Medication List - Protect others around you: Learn how to safely use, store and throw away your medicines at www.disposemymeds.org.          This list is accurate as of: 3/31/17  2:58 PM.  Always use your most recent med list.                   Brand Name Dispense Instructions for use    acetaminophen 325 MG tablet    MAPAP    180 tablet    Take 1-2 tablets (325-650 mg) by mouth every 6 hours as needed for mild pain       alum & mag hydroxide-simethicone 400-400-40 MG/5ML Susp suspension    MYLANTA  ES/MAALOX  ES    1 Bottle    Take 30 mLs by mouth 4 times daily as needed for indigestion       aspirin 81 MG chewable tablet     108 tablet    Take 1 tablet (81 mg) by mouth daily       aspirin-acetaminophen-caffeine 250-250-65 MG per tablet    EXCEDRIN MIGRAINE    80 tablet    Take 1 tablet by mouth every 6 hours as needed for headaches       atorvastatin 10 MG tablet    LIPITOR    30 tablet    Take 1 tablet (10 mg) by mouth daily       fluticasone 50 MCG/ACT spray    FLONASE    16 g    Spray 1 spray into both nostrils daily       glucosamine-chondroitinoitin 500-400 MG Tabs    [facility]S GLUCOSAMINE-CHONDROITIN    120 tablet    Take 2 tablets by mouth 2 times daily       hydrocortisone 1 % cream    CORTAID    30 g    Apply sparingly to affected area three times daily for 14 days.       hydrocortisone 2.5 % cream    ANUSOL-HC    30 g    Place rectally 2 times daily       lidocaine 5 % ointment    XYLOCAINE    142 g    One application 4 x daily to affected areas lower back and knees if necessary Profile Rx: patient will contact pharmacy when needed       loratadine 10 MG tablet    QC LORATADINE ALLERGY RELIEF    30 tablet    Take 1 tablet (10 mg) by mouth daily as needed       metoprolol 25 MG 24 hr tablet    TOPROL-XL    45 tablet    Take 0.5 tablets (12.5 mg) by mouth At Bedtime       omeprazole 20 MG CR capsule    priLOSEC    30 capsule    Take 1 capsule (20 mg) by mouth daily       polyethylene glycol powder    MIRALAX    510 g    Take 17 g (1 capful) by mouth daily       Vitamin D3 2000 UNITS Tabs     60 tablet    Take 2 tablets by mouth daily (with breakfast)

## 2017-03-31 NOTE — PROGRESS NOTES
SUBJECTIVE:                                                    Rody Gonzalez is a 77 year old female who presents to clinic today for the following health issues:  Health Maintenance Due   Topic Date Due     PNEUMOCOCCAL (2 of 2 - PCV13) 09/01/2007     TETANUS IMMUNIZATION (SYSTEM ASSIGNED)  09/01/2016     Health Maintenance reviewed at today's visit patient asked to schedule/complete:   Immunizations:  Patient declined      Hypertension Follow-up      Outpatient blood pressures using a friend BP cuff. They always tell her it is high    Low Salt Diet: not monitoring salt       Amount of exercise or physical activity: yes walking    Problems taking medications regularly: No    Medication side effects: none    Diet: regular (no restrictions)    2/6 HYPERTENSION WITH GOAL OF LESS THAN 140/80 MEASUREMENT HIGH         .  Current Outpatient Prescriptions   Medication Sig Dispense Refill     metoprolol (TOPROL-XL) 25 MG 24 hr tablet Take 0.5 tablets (12.5 mg) by mouth At Bedtime 45 tablet 3     aspirin-acetaminophen-caffeine (EXCEDRIN MIGRAINE) 250-250-65 MG per tablet Take 1 tablet by mouth every 6 hours as needed for headaches 80 tablet 11     acetaminophen (MAPAP) 325 MG tablet Take 1-2 tablets (325-650 mg) by mouth every 6 hours as needed for mild pain 180 tablet 11     omeprazole (PRILOSEC) 20 MG CR capsule Take 1 capsule (20 mg) by mouth daily 30 capsule 11     lidocaine (XYLOCAINE) 5 % ointment One application 4 x daily to affected areas lower back and knees if necessary  Profile Rx: patient will contact pharmacy when needed 142 g 11     alum & mag hydroxide-simethicone (MYLANTA ES/MAALOX  ES) 400-400-40 MG/5ML SUSP suspension Take 30 mLs by mouth 4 times daily as needed for indigestion 1 Bottle 11     polyethylene glycol (MIRALAX) powder Take 17 g (1 capful) by mouth daily 510 g 1     hydrocortisone (CORTAID) 1 % cream Apply sparingly to affected area three times daily for 14 days. 30 g 5     atorvastatin  (LIPITOR) 10 MG tablet Take 1 tablet (10 mg) by mouth daily 30 tablet 11     hydrocortisone (ANUSOL-HC) 2.5 % cream Place rectally 2 times daily 30 g 11     fluticasone (FLONASE) 50 MCG/ACT spray Spray 1 spray into both nostrils daily 16 g 11     Cholecalciferol (VITAMIN D3) 2000 UNITS TABS Take 2 tablets by mouth daily (with breakfast) 60 tablet 11     loratadine (QC LORATADINE ALLERGY RELIEF) 10 MG tablet Take 1 tablet (10 mg) by mouth daily as needed 30 tablet 11     aspirin 81 MG chewable tablet Take 1 tablet (81 mg) by mouth daily 108 tablet 3     glucosamine-chondroitinoitin (CVS GLUCOSAMINE-CHONDROITIN) 500-400 MG TABS Take 2 tablets by mouth 2 times daily 120 tablet 11        No Known Allergies    Immunization History   Administered Date(s) Administered     Pneumococcal 23 valent 2006     Tdap (Adacel,Boostrix) 2006         reports that she does not drink alcohol.      reports that she does not use illicit drugs.    family history includes Family History Negative in her father and mother.    indicated that her mother is . She indicated that her father is .      has a past surgical history that includes Hysterectomy ().     reports that she does not currently engage in sexual activity.  .  Pediatric History   Patient Guardian Status     Not on file.     Other Topics Concern     Parent/Sibling W/ Cabg, Mi Or Angioplasty Before 65f 55m? No     Social History Narrative         reports that she has never smoked. She has never used smokeless tobacco.    Medical, social, surgical, and family histories reviewed.    Problem list, Medication list, Allergies, and Medical/Social/Surgical histories reviewed in [facility] and updated as appropriate.  Labs reviewed in EPIC  Patient Active Problem List   Diagnosis     Health Care Home     Gastroesophageal reflux disease without esophagitis     DDD (degenerative disc disease), lumbar     Dysuria     Anxiety state     Urinary frequency      Tuberculosis of peripheral lymph nodes     Nonspecific abnormal results of thyroid function study     Pulmonary tuberculosis     PPD positive     Papanicolaou smear of cervix with atypical squamous cells of undetermined significance (ASC-US)     Osteoporosis     Tear film insufficiency     Memory loss     Headache     Abdominal pain     Closed fracture of triquetral (cuneiform) bone of wrist     Other chronic pain     Abnormality of gait     Hearing loss     Malaise and fatigue     Acute cystitis     Major depression in complete remission (H)     Sensory hearing loss, unilateral     Vitamin D deficiency disease     Hyperlipidemia LDL goal <160     Overweight (BMI 25.0-29.9)     Risk for falls     ACP (advance care planning)     Primary osteoarthritis of both knees     Tension headache     Past Surgical History:   Procedure Laterality Date     HYSTERECTOMY  2004       Social History   Substance Use Topics     Smoking status: Never Smoker     Smokeless tobacco: Never Used     Alcohol use No     Family History   Problem Relation Age of Onset     Family History Negative Mother      Family History Negative Father          No Known Allergies  Recent Labs   Lab Test  01/12/17   1510  04/13/16   0943  12/04/15   1530  04/10/15   1257  03/04/14   1050  09/13/13   1125   LDL   --   126*  125*  116  134*  120   HDL   --   41*  53  42*  42*  41*   TRIG   --   131  100  77  84  99   ALT   --    --   30  44  22   --    CR  0.63  0.70   --   0.80  0.70  0.70   GFRESTIMATED  >90  Non  GFR Calc    81   --   70  82  82   GFRESTBLACK  >90   GFR Calc    >90   --   85  >90  >90   POTASSIUM  4.2  4.0   --   4.7  4.5  4.4   TSH   --    --    --    --   1.47  1.86        BP Readings from Last 6 Encounters:   03/31/17 144/80   02/07/17 124/64   01/12/17 130/68   09/09/16 146/68   05/13/16 132/78   04/07/16 138/62       Wt Readings from Last 3 Encounters:   03/31/17 161 lb (73 kg)   02/07/17 165 lb (74.8 kg)    01/12/17 164 lb (74.4 kg)         Positive symptoms or findings indicated by bold designation:     ROS: 10 point ROS neg other than the symptoms noted above in the HPI.except  has Health Care Home; Gastroesophageal reflux disease without esophagitis; DDD (degenerative disc disease), lumbar; Dysuria; Anxiety state; Urinary frequency; Tuberculosis of peripheral lymph nodes; Nonspecific abnormal results of thyroid function study; Pulmonary tuberculosis; PPD positive; Papanicolaou smear of cervix with atypical squamous cells of undetermined significance (ASC-US); Osteoporosis; Tear film insufficiency; Memory loss; Headache; Abdominal pain; Closed fracture of triquetral (cuneiform) bone of wrist; Other chronic pain; Abnormality of gait; Hearing loss; Malaise and fatigue; Acute cystitis; Major depression in complete remission (H); Sensory hearing loss, unilateral; Vitamin D deficiency disease; Hyperlipidemia LDL goal <160; Overweight (BMI 25.0-29.9); Risk for falls; ACP (advance care planning); Primary osteoarthritis of both knees; and Tension headache on her problem list.   Constitutional: The patient denied fatigue, fever, insomnia, night sweats, recent illness and weight loss.  NORMAL WEIGHT     Eyes: The patient denied blindness, eye pain, eye tearing, photophobia, vision change and visual disturbance.       Ears/Nose/Throat/Neck: The patient denied dizziness, facial pain, hearing loss, nasal discharge, oral pain, otalgia, postnasal drip, sinus congestion, sore throat, tinnitus and voice change.       Cardiovascular: The patient denied arrhythmia, chest pain/pressure, claudication, edema, exercise intolerance, fatigue, orthopnea, palpitations and syncope.      Respiratory: The patient denied asthma, chest congestion, cough, dyspnea on exertion, dyspnea/shortness of breath, hemoptysis, pedal edema, pleuritic pain, productive sputum, snoring and wheezing.     Gastrointestinal: The patient denied abdominal pain,  anorexia, constipation, diarrhea, dysphagia, gastroesophageal reflux, hematochezia, hemorrhoids, melena, nausea and vomiting . GASTROESOPHAGEAL REFLUX DISEASE WITHOUT ESOPHAGITIS     Genitourinary/Nephrology: The patient denied breast complaint, dysuria, nocturia sexual dysfunction, t, urinary frequency, urinary incontinence, urinary urgency        Musculoskeletal: The patient denied arthralgia(s), back pain, joint complaint, muscle weakness, myalgias, osteoporosis, sciatica, stiffness and swelling.  IMPROVED LOWER BACK PAIN AND OSTEOARTHRITIS     Dermatoligic:: The patient denied acne, dermatitis, ecchymosis, itching, mole change, rash, skin cancer, skin lesion and sores.      Neurologic: The patient denied dizziness, gait abnormality, headache, memory loss, mental status change, paresis, paresthesia, seizure, syncope, tremor and vision change.     Psychiatric: The patient denied anxiety, depression, disturbances of memory, drug abuse, insomnia, mood swings and relationship difficulties.      Endocrine: The patient denied , goiter, obesity, polyuria and thyroid disease.      Hematologic/Lymphatic: The patient denied abnormal bleeding and bruising, abnormal ecchymoses, anemia, lymph node enlargement/mass, petechiae and venous  Thrombosis.      Allergy/Immunology: The patient denied food allergy and  Allergic rhinitis or conjunctivitis.        PE:  /80 (BP Location: Left arm, Cuff Size: Adult Regular)  Pulse 62  Temp 98.6  F (37  C) (Tympanic)  Resp 16  Wt 161 lb (73 kg)  SpO2 98%  BMI 27.21 kg/m2 Body mass index is 27.21 kg/(m^2).    Constitutional: general appearance, well nourished, well developed, in no acute distress, well developed, appears stated age, normal body habitus,      Eyes:; The patient has normal eyelids sclerae and conjunctivae :      Ears/Nose/Throat: external ear, overall: normal appearance; external nose, overall: benign appearance, normal moujth gums and lips  The patient has:       Neck: thyroid, overall: normal size, normal consistency, nontender,      Respiratory:  palpation of chest, overall: normal excursion,    Clear to percussion and auscultation      Tachypnea   Color      Cardiovascular:  Good color with no peripheral edema    Regular sinus rhythm without murmur. Physiologic heart sounds Heart is unelarged  .   Chest/Breast: normal shape       Abdominal exam,  Liver and spleen are  unenlarged        Tenderness    Scars      Urogenital; no renal, flank or bladder  tenderness;      Lymphatic: neck nodes,     Other nodes WITHIN NORMAL LIMITS     Musculoskeletal:  Brief ortho exam normal except:   OSTEOARTHRITIS KNEES     Integument: inspection of skin, no rash, lesions; and, palpation, no induration, no tenderness.      Neurologic mental status, overall: alert and oriented; gait, no ataxia, no unsteadiness; coordination, no tremors; cranial nerves, overall: normal motor, overall: normal bulk, tone.      Psychiatric: orientation/consciousness, overall: oriented to person, place and time; behavior/psychomotor activity, no tics, normal psychomotor activity; mood and affect, overall: normal mood and affect; appearance, overall: well-groomed, good eye contact; speech, overall: normal quality, no aphasia and normal quality, quantity, intact.      Diagnostic Test Results:  Results for orders placed or performed in visit on 02/07/17   ORTHO  REFERRAL    Impression      No appt made.          ICD-10-CM    1. Essential hypertension, benign I10 metoprolol (TOPROL-XL) 25 MG 24 hr tablet   2. Dry eye syndrome, bilateral H04.123 OPHTHALMOLOGY ADULT REFERRAL   3. Poor vision H54.7 OPHTHALMOLOGY ADULT REFERRAL   4. Episodic tension-type headache, not intractable G44.219 metoprolol (TOPROL-XL) 25 MG 24 hr tablet     DISCONTINUED: metoprolol (TOPROL-XL) 25 MG 24 hr tablet   5. Hyperlipidemia LDL goal <160 E78.5         .    Side effects benefits and risks thoroughly discussed. .she may come in  early if unimproved or getting worse          Importance of adhering to regimen discussed and if medications were dispensed, the importance of taking medications discussed and bringing in the medications after every visit for chronic problems         Please drink 2 glasses of water prior to meals and walk 15-30 minutes after meals    I spent  25 MINUTES SPENT  with patient discussing the following issues   The primary encounter diagnosis was Essential hypertension, benign. Diagnoses of Dry eye syndrome, bilateral, Poor vision, Episodic tension-type headache, not intractable, and Hyperlipidemia LDL goal <160 were also pertinent to this visit. over half of which involved counseling and coordination of care.    Patient Instructions   /80 (BP Location: Left arm, Cuff Size: Adult Regular)  Pulse 62  Temp 98.6  F (37  C) (Tympanic)  Resp 16  Wt 161 lb (73 kg)  SpO2 98%  BMI 27.21 kg/m2   (H04.123) Dry eye syndrome, bilateral  (primary encounter diagnosis)  Comment:    Plan: OPHTHALMOLOGY ADULT REFERRAL             (H54.7) Poor vision  Comment:    Plan: OPHTHALMOLOGY ADULT REFERRAL             (G44.219) Episodic tension-type headache, not intractable  Comment:    Plan: metoprolol (TOPROL-XL) 25 MG 24 hr tablet,         DISCONTINUED: metoprolol (TOPROL-XL) 25 MG 24         hr tablet                       Diet:  MEDITERRANEAN DIET     Exercise:    Exercises Range of motion, balance, isometric, and strengthening exercises 30 repetitions twice daily of involved joints      .GAYLA VEGA MD 3/31/2017 2:49 PM  March 31, 2017

## 2017-03-31 NOTE — PATIENT INSTRUCTIONS
(I10) Essential hypertension, benign  (primary encounter diagnosis)  Comment:    Plan: metoprolol (TOPROL-XL) 25 MG 24 hr tablet             (H04.123) Dry eye syndrome, bilateral  Comment:    Plan: OPHTHALMOLOGY ADULT REFERRAL             (H54.7) Poor vision  Comment:    Plan: OPHTHALMOLOGY ADULT REFERRAL             (G44.219) Episodic tension-type headache, not intractable  Comment:    Plan: metoprolol (TOPROL-XL) 25 MG 24 hr tablet,         DISCONTINUED: metoprolol (TOPROL-XL) 25 MG 24         hr tablet             (E78.5) Hyperlipidemia LDL goal <160  Comment:  MEDITERRANEAN DIET   Plan:    GAYLA VEGA JR., MD

## 2017-04-01 ASSESSMENT — PATIENT HEALTH QUESTIONNAIRE - PHQ9: SUM OF ALL RESPONSES TO PHQ QUESTIONS 1-9: 0

## 2017-06-01 ENCOUNTER — OFFICE VISIT (OUTPATIENT)
Dept: FAMILY MEDICINE | Facility: CLINIC | Age: 77
End: 2017-06-01
Payer: COMMERCIAL

## 2017-06-01 VITALS
RESPIRATION RATE: 16 BRPM | OXYGEN SATURATION: 100 % | BODY MASS INDEX: 27.12 KG/M2 | TEMPERATURE: 98.4 F | HEART RATE: 55 BPM | SYSTOLIC BLOOD PRESSURE: 128 MMHG | WEIGHT: 160.5 LBS | DIASTOLIC BLOOD PRESSURE: 64 MMHG

## 2017-06-01 DIAGNOSIS — M51.369 DDD (DEGENERATIVE DISC DISEASE), LUMBAR: Chronic | ICD-10-CM

## 2017-06-01 DIAGNOSIS — K21.9 GASTROESOPHAGEAL REFLUX DISEASE WITHOUT ESOPHAGITIS: ICD-10-CM

## 2017-06-01 DIAGNOSIS — E55.9 VITAMIN D INSUFFICIENCY: ICD-10-CM

## 2017-06-01 DIAGNOSIS — R63.0 POOR APPETITE: ICD-10-CM

## 2017-06-01 DIAGNOSIS — M17.0 PRIMARY OSTEOARTHRITIS OF BOTH KNEES: Primary | Chronic | ICD-10-CM

## 2017-06-01 DIAGNOSIS — M25.551 HIP PAIN, RIGHT: ICD-10-CM

## 2017-06-01 LAB
ALBUMIN SERPL-MCNC: 4.1 G/DL (ref 3.4–5)
ERYTHROCYTE [SEDIMENTATION RATE] IN BLOOD BY WESTERGREN METHOD: 16 MM/H (ref 0–30)

## 2017-06-01 PROCEDURE — 82040 ASSAY OF SERUM ALBUMIN: CPT | Performed by: FAMILY MEDICINE

## 2017-06-01 PROCEDURE — 85652 RBC SED RATE AUTOMATED: CPT | Performed by: FAMILY MEDICINE

## 2017-06-01 PROCEDURE — 99214 OFFICE O/P EST MOD 30 MIN: CPT | Performed by: FAMILY MEDICINE

## 2017-06-01 PROCEDURE — 36415 COLL VENOUS BLD VENIPUNCTURE: CPT | Performed by: FAMILY MEDICINE

## 2017-06-01 RX ORDER — NICOTINE POLACRILEX 4 MG/1
20 GUM, CHEWING ORAL DAILY
Qty: 30 TABLET | Refills: 11 | Status: SHIPPED | OUTPATIENT
Start: 2017-06-01 | End: 2017-09-14

## 2017-06-01 RX ORDER — ACETAMINOPHEN 325 MG/1
325-650 TABLET ORAL EVERY 6 HOURS PRN
Qty: 180 TABLET | Refills: 11 | Status: SHIPPED | OUTPATIENT
Start: 2017-06-01 | End: 2017-09-14

## 2017-06-01 RX ORDER — NICOTINE POLACRILEX 4 MG/1
20 GUM, CHEWING ORAL DAILY
Qty: 30 TABLET | Refills: 11 | Status: SHIPPED | OUTPATIENT
Start: 2017-06-01 | End: 2017-06-01

## 2017-06-01 NOTE — LETTER
[facility]  [address]  [address]  [place] [id number] [phone]                                                                                                           Rody Gonzalez  [address]   [place] [address]    June 2, 2017      Dear Rody,    The results of your recent tests were reviewed and are enclosed.     NORMAL ALBUMEN   ADEQUATE PROTEIN INTAKE   NORMAL ERTHROCYTE SEDIMENTATION RATE IE INFLAMMATORY MARKER   Results for orders placed or performed in visit on 06/01/17   Albumin level   Result Value Ref Range    Albumin 4.1 3.4 - 5.0 g/dL   Erythrocyte sedimentation rate auto   Result Value Ref Range    Sed Rate 16 0 - 30 mm/h           Thank you for choosing [facility].  We appreciate the opportunity to serve you and look forward to supporting your healthcare needs in the future.    If you have any questions or concerns, please call me or my staff at [phone].      Sincerely,    Osman Yen Jr MD

## 2017-06-01 NOTE — NURSING NOTE
"Chief Complaint   Patient presents with     Musculoskeletal Problem     legs, muscles and joints       Initial /64  Pulse 55  Temp 98.4  F (36.9  C) (Tympanic)  Resp 16  Wt 160 lb 8 oz (72.8 kg)  SpO2 100%  BMI 27.12 kg/m2 Estimated body mass index is 27.12 kg/(m^2) as calculated from the following:    Height as of 1/12/17: 5' 4.5\" (1.638 m).    Weight as of this encounter: 160 lb 8 oz (72.8 kg).  Medication Reconciliation: complete   Janet Lunsford CMA    "

## 2017-06-01 NOTE — MR AVS SNAPSHOT
After Visit Summary   6/1/2017    Rody Gonzalez    MRN: [id number]           Patient Information     Date Of Birth          1940        Visit Information        Provider Department      6/1/2017 10:30 AM Osman Yen MD; [employer] [facility]        Today's Diagnoses     Primary osteoarthritis of both knees    -  1    Gastroesophageal reflux disease without esophagitis        Vitamin D insufficiency        DDD (degenerative disc disease), lumbar        Hip pain, right        Poor appetite          Care Instructions    (M17.0) Primary osteoarthritis of both knees  (primary encounter diagnosis)  Comment:    Plan: ORTHO  REFERRAL             (K21.9) Gastroesophageal reflux disease without esophagitis  Comment:    Plan: omeprazole (KLS OMPERAZOLE) 20 MG tablet,         DISCONTINUED: omeprazole (KLS OMPERAZOLE) 20 MG        tablet             (E55.9) Vitamin D insufficiency  Comment:    Plan: acetaminophen (MAPAP) 325 MG tablet             (M51.36) DDD (degenerative disc disease), lumbar  Comment:  LUMBAR DISC MILD   Plan: ORTHO  REFERRAL             (M25.551) Hip pain, right  Comment:  BILATERAL   Plan: ORTHO  REFERRAL                          Follow-ups after your visit        Additional Services     ORTHO  REFERRAL       [facility] is referring you to the Orthopedic  Services at [facility].       The  Representative will assist you in the coordination of your Orthopedic and Musculoskeletal Care as prescribed by your physician.    The  Representative will call you within 1 business day to help schedule your appointment, or you may contact the  Representative at:    All areas ~ [phone]  OSTEOARTHRITIS KNEES BILATERAL   LOWER BACK PAIN   OUTER HIP PAIN   WANTS ANOTHER OPINION      Type of Referral : Non Surgical      "  Timeframe requested: Routine    Coverage of these services is subject to the terms and limitations of your health insurance plan.  Please call member services at your health plan with any benefit or coverage questions.      If X-rays, CT or MRI's have been performed, please contact the facility where they were done to arrange for , prior to your scheduled appointment.  Please bring this referral request to your appointment and present it to your specialist.                  Who to contact     If you have questions or need follow up information about today's clinic visit or your schedule please contact [facility] directly at [phone].  Normal or non-critical lab and imaging results will be communicated to you by [employer], letter or phone within 4 business days after the clinic has received the results. If you do not hear from us within 7 days, please contact the clinic through [employer] or phone. If you have a critical or abnormal lab result, we will notify you by phone as soon as possible.  Submit refill requests through [employer] or call your pharmacy and they will forward the refill request to us. Please allow 3 business days for your refill to be completed.          Additional Information About Your Visit        MyChart Information     [employer] lets you send messages to your doctor, view your test results, renew your prescriptions, schedule appointments and more. To sign up, go to [email or web address] . Click on \"Log in\" on the left side of the screen, which will take you to the Welcome page. Then click on \"Sign up Now\" on the right side of the page.     You will be asked to enter the access code listed below, as well as some personal information. Please follow the directions to create your username and password.     Your access code is: [email or web address]  Expires: 2017 11:04 AM     Your access code will  in 90 days. If you need help or a new code, please call " your [place] clinic or [phone].        Care EveryWhere ID     This is your Care EveryWhere ID. This could be used by other organizations to access your [place] medical records  [id number]        Your Vitals Were     Pulse Temperature Respirations Pulse Oximetry BMI (Body Mass Index)       55 98.4  F (36.9  C) (Tympanic) 16 100% 27.12 kg/m2        Blood Pressure from Last 3 Encounters:   06/01/17 128/64   03/31/17 144/80   02/07/17 124/64    Weight from Last 3 Encounters:   06/01/17 160 lb 8 oz (72.8 kg)   03/31/17 161 lb (73 kg)   02/07/17 165 lb (74.8 kg)              We Performed the Following     Albumin level     Erythrocyte sedimentation rate auto     ORTHO  REFERRAL          Today's Medication Changes          These changes are accurate as of: 6/1/17 11:04 AM.  If you have any questions, ask your nurse or doctor.               Start taking these medicines.        Dose/Directions    omeprazole 20 MG tablet   Commonly known as:  KLS OMPERAZOLE   Used for:  Gastroesophageal reflux disease without esophagitis   Started by:  Osman Yen MD        Dose:  20 mg   Take 1 tablet (20 mg) by mouth daily   Quantity:  30 tablet   Refills:  11            Where to get your medicines      These medications were sent to [facility] [place] [address]  [facility] [facility] [address]     Phone:  [phone]     acetaminophen 325 MG tablet    omeprazole 20 MG tablet                Primary Care Provider Office Phone # Fax #    Osman Yen [phone]       [facility] [address] [facility] [address]        Thank you!     Thank you for choosing [facility]  for your care. Our goal is always to provide you with excellent care. Hearing back from our patients is one way we can continue to improve our services. Please take a few minutes to complete the written survey that you may receive in  the mail after your visit with us. Thank you!             Your Updated Medication List - Protect others around you: Learn how to safely use, store and throw away your medicines at www.disposemymeds.org.          This list is accurate as of: 6/1/17 11:04 AM.  Always use your most recent med list.                   Brand Name Dispense Instructions for use    acetaminophen 325 MG tablet    MAPAP    180 tablet    Take 1-2 tablets (325-650 mg) by mouth every 6 hours as needed for mild pain       alum & mag hydroxide-simethicone 400-400-40 MG/5ML Susp suspension    MYLANTA ES/MAALOX  ES    1 Bottle    Take 30 mLs by mouth 4 times daily as needed for indigestion       aspirin 81 MG chewable tablet     108 tablet    Take 1 tablet (81 mg) by mouth daily       aspirin-acetaminophen-caffeine 250-250-65 MG per tablet    EXCEDRIN MIGRAINE    80 tablet    Take 1 tablet by mouth every 6 hours as needed for headaches       atorvastatin 10 MG tablet    LIPITOR    30 tablet    Take 1 tablet (10 mg) by mouth daily       fluticasone 50 MCG/ACT spray    FLONASE    16 g    Spray 1 spray into both nostrils daily       glucosamine-chondroitinoitin 500-400 MG Tabs    CVS GLUCOSAMINE-CHONDROITIN    120 tablet    Take 2 tablets by mouth 2 times daily       hydrocortisone 1 % cream    CORTAID    30 g    Apply sparingly to affected area three times daily for 14 days.       hydrocortisone 2.5 % cream    ANUSOL-HC    30 g    Place rectally 2 times daily       lidocaine 5 % ointment    XYLOCAINE    142 g    One application 4 x daily to affected areas lower back and knees if necessary Profile Rx: patient will contact pharmacy when needed       loratadine 10 MG tablet    QC LORATADINE ALLERGY RELIEF    30 tablet    Take 1 tablet (10 mg) by mouth daily as needed       metoprolol 25 MG 24 hr tablet    TOPROL-XL    45 tablet    Take 0.5 tablets (12.5 mg) by mouth At Bedtime       omeprazole 20 MG tablet    KLS OMPERAZOLE    30 tablet    Take 1 tablet  (20 mg) by mouth daily       polyethylene glycol powder    MIRALAX    510 g    Take 17 g (1 capful) by mouth daily       Vitamin D3 2000 UNITS Tabs     60 tablet    Take 2 tablets by mouth daily (with breakfast)

## 2017-06-01 NOTE — PROGRESS NOTES
SUBJECTIVE:                                                    Rody Gonzalez is a 77 year old female who presents to clinic today for the following health issues:  Health Maintenance Due   Topic Date Due     PNEUMOCOCCAL (2 of 2 - PCV13) 09/01/2007     TETANUS IMMUNIZATION (SYSTEM ASSIGNED)  09/01/2016     Health Maintenance reviewed at today's visit patient asked to schedule/complete:   Immunizations:  Patient declined      Joint Pain     Onset: 3 years, getting worse recently    Description:   Location: both knees and hips, muscles in legs  Character: Fullness    Intensity: moderate    Progression of Symptoms: worse when walking    Accompanying Signs & Symptoms:  Other symptoms: none   History:   Previous similar pain: YES      Precipitating factors:   Trauma or overuse: no     Alleviating factors:  Improved by: nothing       Therapies Tried and outcome: na        .  Current Outpatient Prescriptions   Medication Sig Dispense Refill     omeprazole (KLS OMPERAZOLE) 20 MG tablet Take 1 tablet (20 mg) by mouth daily 30 tablet 11     acetaminophen (MAPAP) 325 MG tablet Take 1-2 tablets (325-650 mg) by mouth every 6 hours as needed for mild pain 180 tablet 11     metoprolol (TOPROL-XL) 25 MG 24 hr tablet Take 0.5 tablets (12.5 mg) by mouth At Bedtime 45 tablet 3     aspirin-acetaminophen-caffeine (EXCEDRIN MIGRAINE) 250-250-65 MG per tablet Take 1 tablet by mouth every 6 hours as needed for headaches 80 tablet 11     lidocaine (XYLOCAINE) 5 % ointment One application 4 x daily to affected areas lower back and knees if necessary  Profile Rx: patient will contact pharmacy when needed 142 g 11     alum & mag hydroxide-simethicone (MYLANTA ES/MAALOX  ES) 400-400-40 MG/5ML SUSP suspension Take 30 mLs by mouth 4 times daily as needed for indigestion 1 Bottle 11     polyethylene glycol (MIRALAX) powder Take 17 g (1 capful) by mouth daily 510 g 1     hydrocortisone (CORTAID) 1 % cream Apply sparingly to affected area three  times daily for 14 days. 30 g 5     atorvastatin (LIPITOR) 10 MG tablet Take 1 tablet (10 mg) by mouth daily 30 tablet 11     hydrocortisone (ANUSOL-HC) 2.5 % cream Place rectally 2 times daily 30 g 11     fluticasone (FLONASE) 50 MCG/ACT spray Spray 1 spray into both nostrils daily 16 g 11     Cholecalciferol (VITAMIN D3) 2000 UNITS TABS Take 2 tablets by mouth daily (with breakfast) 60 tablet 11     loratadine (QC LORATADINE ALLERGY RELIEF) 10 MG tablet Take 1 tablet (10 mg) by mouth daily as needed 30 tablet 11     aspirin 81 MG chewable tablet Take 1 tablet (81 mg) by mouth daily 108 tablet 3     glucosamine-chondroitinoitin (CVS GLUCOSAMINE-CHONDROITIN) 500-400 MG TABS Take 2 tablets by mouth 2 times daily 120 tablet 11        No Known Allergies    Immunization History   Administered Date(s) Administered     Pneumococcal 23 valent 2006     Tdap (Adacel,Boostrix) 2006         reports that she does not drink alcohol.      reports that she does not use illicit drugs.    family history includes Family History Negative in her father and mother.    indicated that her mother is . She indicated that her father is .      has a past surgical history that includes Hysterectomy ().     reports that she does not currently engage in sexual activity.  .  Pediatric History   Patient Guardian Status     Not on file.     Other Topics Concern     Parent/Sibling W/ Cabg, Mi Or Angioplasty Before 65f 55m? No     Social History Narrative         reports that she has never smoked. She has never used smokeless tobacco.    Medical, social, surgical, and family histories reviewed.    Problem list, Medication list, Allergies, and Medical/Social/Surgical histories reviewed in [facility] and updated as appropriate.  Labs reviewed in EPIC  Patient Active Problem List   Diagnosis     Health Care Home     Gastroesophageal reflux disease without esophagitis     DDD (degenerative disc disease), lumbar     Dysuria      Anxiety state     Urinary frequency     Tuberculosis of peripheral lymph nodes     Nonspecific abnormal results of thyroid function study     Pulmonary tuberculosis     PPD positive     Papanicolaou smear of cervix with atypical squamous cells of undetermined significance (ASC-US)     Osteoporosis     Tear film insufficiency     Memory loss     Headache     Abdominal pain     Closed fracture of triquetral (cuneiform) bone of wrist     Other chronic pain     Abnormality of gait     Hearing loss     Malaise and fatigue     Acute cystitis     Major depression in complete remission (H)     Sensory hearing loss, unilateral     Vitamin D deficiency disease     Hyperlipidemia LDL goal <160     Overweight (BMI 25.0-29.9)     Risk for falls     ACP (advance care planning)     Primary osteoarthritis of both knees     Tension headache     Past Surgical History:   Procedure Laterality Date     HYSTERECTOMY  2004       Social History   Substance Use Topics     Smoking status: Never Smoker     Smokeless tobacco: Never Used     Alcohol use No     Family History   Problem Relation Age of Onset     Family History Negative Mother      Family History Negative Father          No Known Allergies  Recent Labs   Lab Test  01/12/17   1510  04/13/16   0943  12/04/15   1530  04/10/15   1257  03/04/14   1050  09/13/13   1125   LDL   --   126*  125*  116  134*  120   HDL   --   41*  53  42*  42*  41*   TRIG   --   131  100  77  84  99   ALT   --    --   30  44  22   --    CR  0.63  0.70   --   0.80  0.70  0.70   GFRESTIMATED  >90  Non  GFR Calc    81   --   70  82  82   GFRESTBLACK  >90   GFR Calc    >90   --   85  >90  >90   POTASSIUM  4.2  4.0   --   4.7  4.5  4.4   TSH   --    --    --    --   1.47  1.86        BP Readings from Last 6 Encounters:   06/01/17 128/64   03/31/17 144/80   02/07/17 124/64   01/12/17 130/68   09/09/16 146/68   05/13/16 132/78       Wt Readings from Last 3 Encounters:   06/01/17 160  lb 8 oz (72.8 kg)   03/31/17 161 lb (73 kg)   02/07/17 165 lb (74.8 kg)         Positive symptoms or findings indicated by bold designation:     ROS: 10 point ROS neg other than the symptoms noted above in the HPI.except  has Health Care Home; Gastroesophageal reflux disease without esophagitis; DDD (degenerative disc disease), lumbar; Dysuria; Anxiety state; Urinary frequency; Tuberculosis of peripheral lymph nodes; Nonspecific abnormal results of thyroid function study; Pulmonary tuberculosis; PPD positive; Papanicolaou smear of cervix with atypical squamous cells of undetermined significance (ASC-US); Osteoporosis; Tear film insufficiency; Memory loss; Headache; Abdominal pain; Closed fracture of triquetral (cuneiform) bone of wrist; Other chronic pain; Abnormality of gait; Hearing loss; Malaise and fatigue; Acute cystitis; Major depression in complete remission (H); Sensory hearing loss, unilateral; Vitamin D deficiency disease; Hyperlipidemia LDL goal <160; Overweight (BMI 25.0-29.9); Risk for falls; ACP (advance care planning); Primary osteoarthritis of both knees; and Tension headache on her problem list.   Constitutional: The patient denied fatigue, fever, insomnia, night sweats, recent illness and weight loss.  OVER WEIGHT     Eyes: The patient denied blindness, eye pain, eye tearing, photophobia, vision change and visual disturbance. NORMAL VISION       Ears/Nose/Throat/Neck: The patient denied dizziness, facial pain, hearing loss, nasal discharge, oral pain, otalgia, postnasal drip, sinus congestion, sore throat, tinnitus and voice change.   NORMAL HEARING     Cardiovascular: The patient denied arrhythmia, chest pain/pressure, claudication, edema, exercise intolerance, fatigue, orthopnea, palpitations and syncope.  ASYMPTOMATIC     Respiratory: The patient denied asthma, chest congestion, cough, dyspnea on exertion, dyspnea/shortness of breath, hemoptysis, pedal edema, pleuritic pain, productive sputum,  snoring and wheezing. NORMAL     Gastrointestinal: The patient denied abdominal pain, anorexia, constipation, diarrhea, dysphagia, gastroesophageal reflux, hematochezia, hemorrhoids, melena, nausea and vomiting . GASTROESOPHAGEAL REFLUX DISEASE WITHOUT ESOPHAGITIS     Genitourinary/Nephrology: The patient denied breast complaint, dysuria, nocturia sexual dysfunction, t, urinary frequency, urinary incontinence, urinary urgency    NORMAL     Musculoskeletal: The patient denied arthralgia(s), back pain, joint complaint, muscle weakness, myalgias, osteoporosis, sciatica, stiffness and swelling.  NORMAL     Dermatoligic:: The patient denied acne, dermatitis, ecchymosis, itching, mole change, rash, skin cancer, skin lesion and sores.  NORMAL     Neurologic: The patient denied dizziness, gait abnormality, headache, memory loss, mental status change, paresis, paresthesia, seizure, syncope, tremor and vision change. NORMAL     Psychiatric: The patient denied anxiety, depression, disturbances of memory, drug abuse, insomnia, mood swings and relationship difficulties.  NORMAL     Endocrine: The patient denied , goiter, obesity, polyuria and thyroid disease.  NORMAL     Hematologic/Lymphatic: The patient denied abnormal bleeding and bruising, abnormal ecchymoses, anemia, lymph node enlargement/mass, petechiae and venous  Thrombosis.  NORMAL     Allergy/Immunology: The patient denied food allergy and  Allergic rhinitis or conjunctivitis.  NORMAL       PE:  /64  Pulse 55  Temp 98.4  F (36.9  C) (Tympanic)  Resp 16  Wt 160 lb 8 oz (72.8 kg)  SpO2 100%  BMI 27.12 kg/m2 Body mass index is 27.12 kg/(m^2).    Constitutional: general appearance, well nourished, well developed, in no acute distress, well developed, appears stated age, normal body habitus,  NORMAL     Eyes:; The patient has normal eyelids sclerae and conjunctivae : NORMAL       Ears/Nose/Throat: external ear, overall: normal appearance; external nose,  overall: benign appearance, normal moujth gums and lips  The patient has:  NORMAL      Neck: thyroid, overall: normal size, normal consistency, nontender,  NORMAL     Respiratory:  palpation of chest, overall: normal excursion,  NORMAL   Clear to percussion and auscultation  NORMAL     Tachypnea  NORMAL  Color  NORMAL     Cardiovascular:  Good color with no peripheral edema  NORMAL   Regular sinus rhythm without murmur. Physiologic heart sounds Heart is unelarged  .   Chest/Breast: normal shape  NORMAL      Abdominal exam,  Liver and spleen are  unenlarged  NORMAL       Tenderness  NORMAL   Scars  NOT APPLICABLE     Urogenital; no renal, flank or bladder  tenderness;  NORMAL     Lymphatic: neck nodes,  NORMAL    Other nodes NOT APPLICABLE     Musculoskeletal:  Brief ortho exam normal except:   OSTEOARTHRITIS KNEES BILATERAL   DECREASE RANGE OF MOTION OF BACK NOTED   NEGATIVE STRAIGHT LEG RAISING TEST   NORMAL RANGE OF MOTION OF HIP   SUBJECTIVE RIGHT HIP PAIN WITH INTERNAL  AND EXTERNAL ROTATION RIGHT HIP     Integument: inspection of skin, no rash, lesions; and, palpation, no induration, no tenderness.      Neurologic mental status, overall: alert and oriented; gait, no ataxia, no unsteadiness; coordination, no tremors; cranial nerves, overall: normal motor, overall: normal bulk, tone.      Psychiatric: orientation/consciousness, overall: oriented to person, place and time; behavior/psychomotor activity, no tics, normal psychomotor activity; mood and affect, overall: normal mood and affect; appearance, overall: well-groomed, good eye contact; speech, overall: normal quality, no aphasia and normal quality, quantity, intact.      Diagnostic Test Results:  Results for orders placed or performed in visit on 06/01/17   Erythrocyte sedimentation rate auto   Result Value Ref Range    Sed Rate 16 0 - 30 mm/h         ICD-10-CM    1. Primary osteoarthritis of both knees M17.0 ORTHO  REFERRAL     Erythrocyte  sedimentation rate auto   2. Gastroesophageal reflux disease without esophagitis K21.9 omeprazole (KLS OMPERAZOLE) 20 MG tablet     DISCONTINUED: omeprazole (KLS OMPERAZOLE) 20 MG tablet   3. Vitamin D insufficiency E55.9 acetaminophen (MAPAP) 325 MG tablet   4. DDD (degenerative disc disease), lumbar M51.36 ORTHO  REFERRAL   5. Hip pain, right M25.551 ORTHO  REFERRAL   6. Poor appetite R63.0 Albumin level        .    Side effects benefits and risks thoroughly discussed. .she may come in early if unimproved or getting worse          Importance of adhering to regimen discussed and if medications were dispensed, the importance of taking medications discussed and bringing in the medications after every visit for chronic problems         Please drink 2 glasses of water prior to meals and walk 15-30 minutes after meals    I spent 25 MINUTES SPENT  with patient discussing the following issues   The primary encounter diagnosis was Primary osteoarthritis of both knees. Diagnoses of Gastroesophageal reflux disease without esophagitis, Vitamin D insufficiency, DDD (degenerative disc disease), lumbar, Hip pain, right, and Poor appetite were also pertinent to this visit. over half of which involved counseling and coordination of care.    Patient Instructions   (M17.0) Primary osteoarthritis of both knees  (primary encounter diagnosis)  Comment:    Plan: ORTHO  REFERRAL             (K21.9) Gastroesophageal reflux disease without esophagitis  Comment:    Plan: omeprazole (KLS OMPERAZOLE) 20 MG tablet,         DISCONTINUED: omeprazole (KLS OMPERAZOLE) 20 MG        tablet             (E55.9) Vitamin D insufficiency  Comment:    Plan: acetaminophen (MAPAP) 325 MG tablet             (M51.36) DDD (degenerative disc disease), lumbar  Comment:  LUMBAR DISC MILD   Plan: ORTHO  REFERRAL             (M25.551) Hip pain, right  Comment:  BILATERAL   Plan: ORTHO  REFERRAL                        ALL  THE ABOVE PROBLEMS ARE STABLE AND MED CHANGES AS NOTED    Diet: MEDITERRANEAN DIET     Exercise:  RANGE OF MOTION   Exercises Range of motion, balance, isometric, and strengthening exercises 30 repetitions twice daily of involved joints      .GAYLA VEGA MD 6/1/2017 10:48 AM  June 1, 2017

## 2017-06-01 NOTE — PATIENT INSTRUCTIONS
(M17.0) Primary osteoarthritis of both knees  (primary encounter diagnosis)  Comment:    Plan: ORTHO  REFERRAL             (K21.9) Gastroesophageal reflux disease without esophagitis  Comment:    Plan: omeprazole (KLS OMPERAZOLE) 20 MG tablet,         DISCONTINUED: omeprazole (KLS OMPERAZOLE) 20 MG        tablet             (E55.9) Vitamin D insufficiency  Comment:    Plan: acetaminophen (MAPAP) 325 MG tablet             (M51.36) DDD (degenerative disc disease), lumbar  Comment:  LUMBAR DISC MILD   Plan: ORTHO  REFERRAL             (M25.551) Hip pain, right  Comment:  BILATERAL   Plan: ORTHO  REFERRAL

## 2017-06-02 NOTE — PROGRESS NOTES
Please send normal lab letter when labs are complete  NORMAL ALBUMEN  ADEQUATE PROTEIN INTAKE   NORMAL ERTHROCYTE SEDIMENTATION RATE IE INFLAMMATORY MARKER   GAYLA VEGA JR., MD

## 2017-06-15 ENCOUNTER — OFFICE VISIT (OUTPATIENT)
Dept: FAMILY MEDICINE | Facility: CLINIC | Age: 77
End: 2017-06-15
Payer: COMMERCIAL

## 2017-06-15 VITALS
SYSTOLIC BLOOD PRESSURE: 118 MMHG | DIASTOLIC BLOOD PRESSURE: 64 MMHG | HEART RATE: 54 BPM | TEMPERATURE: 98.5 F | RESPIRATION RATE: 16 BRPM | WEIGHT: 158.5 LBS | BODY MASS INDEX: 26.79 KG/M2 | OXYGEN SATURATION: 97 %

## 2017-06-15 DIAGNOSIS — L29.9 EAR ITCHING: ICD-10-CM

## 2017-06-15 DIAGNOSIS — H93.11 TINNITUS OF RIGHT EAR: Primary | ICD-10-CM

## 2017-06-15 DIAGNOSIS — R29.6 TENDENCY TO FALL: ICD-10-CM

## 2017-06-15 DIAGNOSIS — E78.5 HYPERLIPIDEMIA LDL GOAL <160: Chronic | ICD-10-CM

## 2017-06-15 PROCEDURE — 99213 OFFICE O/P EST LOW 20 MIN: CPT | Performed by: FAMILY MEDICINE

## 2017-06-15 RX ORDER — ATORVASTATIN CALCIUM 10 MG/1
10 TABLET, FILM COATED ORAL DAILY
Qty: 30 TABLET | Refills: 11 | Status: SHIPPED | OUTPATIENT
Start: 2017-06-15 | End: 2017-09-14

## 2017-06-15 RX ORDER — CIPROFLOXACIN AND DEXAMETHASONE 3; 1 MG/ML; MG/ML
4 SUSPENSION/ DROPS AURICULAR (OTIC) 2 TIMES DAILY
Qty: 7.5 ML | Refills: 0 | Status: SHIPPED | OUTPATIENT
Start: 2017-06-15 | End: 2017-06-22

## 2017-06-15 NOTE — NURSING NOTE
"Chief Complaint   Patient presents with     Ent Problem     Lab Result Notice       Initial /64  Pulse 54  Temp 98.5  F (36.9  C) (Tympanic)  Resp 16  Wt 158 lb 8 oz (71.9 kg)  SpO2 97%  BMI 26.79 kg/m2 Estimated body mass index is 26.79 kg/(m^2) as calculated from the following:    Height as of 1/12/17: 5' 4.5\" (1.638 m).    Weight as of this encounter: 158 lb 8 oz (71.9 kg).  Medication Reconciliation: complete   Janet Lunsford CMA    "

## 2017-06-15 NOTE — MR AVS SNAPSHOT
"              After Visit Summary   6/15/2017    Rody Gonzalez    MRN: [id number]           Patient Information     Date Of Birth          1940        Visit Information        Provider Department      6/15/2017 2:00 PM Osman Yen MD; [employer] [facility]        Today's Diagnoses     Tinnitus of right ear    -  1    Ear itching        Tendency to fall        Hyperlipidemia LDL goal <160          Care Instructions    (H93.11) Tinnitus of right ear  (primary encounter diagnosis)  Comment:    Plan:      (L29.9) Ear itching  Comment:    Plan: ciprofloxacin-dexamethasone (CIPRODEX) otic         suspension             (R29.6) Tendency to fall  Comment:    Plan: order for DME             (E78.5) Hyperlipidemia LDL goal <160  Comment:    Plan: atorvastatin (LIPITOR) 10 MG tablet                         Follow-ups after your visit        Who to contact     If you have questions or need follow up information about today's clinic visit or your schedule please contact [facility] directly at [phone].  Normal or non-critical lab and imaging results will be communicated to you by [facility], letter or phone within 4 business days after the clinic has received the results. If you do not hear from us within 7 days, please contact the clinic through [employer] or phone. If you have a critical or abnormal lab result, we will notify you by phone as soon as possible.  Submit refill requests through [employer] or call your pharmacy and they will forward the refill request to us. Please allow 3 business days for your refill to be completed.          Additional Information About Your Visit        [facility]art Information     [employer] lets you send messages to your doctor, view your test results, renew your prescriptions, schedule appointments and more. To sign up, go to [email or web address] . Click on \"Log in\" on the left side of the " "screen, which will take you to the Welcome page. Then click on \"Sign up Now\" on the right side of the page.     You will be asked to enter the access code listed below, as well as some personal information. Please follow the directions to create your username and password.     Your access code is: [email or web address]  Expires: 2017 11:04 AM     Your access code will  in 90 days. If you need help or a new code, please call your [facility] or [phone].        Care EveryWhere ID     This is your Care EveryWhere ID. This could be used by other organizations to access your [facility] medical records  [id number]        Your Vitals Were     Pulse Temperature Respirations Pulse Oximetry BMI (Body Mass Index)       54 98.5  F (36.9  C) (Tympanic) 16 97% 26.79 kg/m2        Blood Pressure from Last 3 Encounters:   06/15/17 118/64   17 128/64   17 144/80    Weight from Last 3 Encounters:   06/15/17 158 lb 8 oz (71.9 kg)   17 160 lb 8 oz (72.8 kg)   17 161 lb (73 kg)              Today, you had the following     No orders found for display         Today's Medication Changes          These changes are accurate as of: 6/15/17  2:49 PM.  If you have any questions, ask your nurse or doctor.               Start taking these medicines.        Dose/Directions    ciprofloxacin-dexamethasone otic suspension   Commonly known as:  CIPRODEX   Used for:  Ear itching   Started by:  Osman Yen MD        Dose:  4 drop   Place 4 drops into the right ear 2 times daily for 7 days   Quantity:  7.5 mL   Refills:  0       order for DME   Used for:  Tendency to fall   Started by:  Osman Yen MD        Cane of any materials of adujustable priya *one  Use as directed*   Quantity:  1 each   Refills:  0            Where to get your medicines      These medications were sent to [facility] - [place], [place] - [address]  [address], [place] [address]     Phone:  " [phone]     atorvastatin 10 MG tablet    ciprofloxacin-dexamethasone otic suspension         Some of these will need a paper prescription and others can be bought over the counter.  Ask your nurse if you have questions.     Bring a paper prescription for each of these medications     order for DME                Primary Care Provider Office Phone # Fax #    Osman Yen [phone]       [facility] [address]  [facility] [address]        Thank you!     Thank you for choosing [facility]  for your care. Our goal is always to provide you with excellent care. Hearing back from our patients is one way we can continue to improve our services. Please take a few minutes to complete the written survey that you may receive in the mail after your visit with us. Thank you!             Your Updated Medication List - Protect others around you: Learn how to safely use, store and throw away your medicines at www.disposemymeds.org.          This list is accurate as of: 6/15/17  2:49 PM.  Always use your most recent med list.                   Brand Name Dispense Instructions for use    acetaminophen 325 MG tablet    MAPAP    180 tablet    Take 1-2 tablets (325-650 mg) by mouth every 6 hours as needed for mild pain       alum & mag hydroxide-simethicone 400-400-40 MG/5ML Susp suspension    MYLANTA ES/MAALOX  ES    1 Bottle    Take 30 mLs by mouth 4 times daily as needed for indigestion       aspirin 81 MG chewable tablet     108 tablet    Take 1 tablet (81 mg) by mouth daily       aspirin-acetaminophen-caffeine 250-250-65 MG per tablet    EXCEDRIN MIGRAINE    80 tablet    Take 1 tablet by mouth every 6 hours as needed for headaches       atorvastatin 10 MG tablet    LIPITOR    30 tablet    Take 1 tablet (10 mg) by mouth daily       ciprofloxacin-dexamethasone otic suspension    CIPRODEX    7.5 mL    Place 4 drops into the right ear 2 times daily  for 7 days       fluticasone 50 MCG/ACT spray    FLONASE    16 g    Spray 1 spray into both nostrils daily       glucosamine-chondroitinoitin 500-400 MG Tabs    [facility] GLUCOSAMINE-CHONDROITIN    120 tablet    Take 2 tablets by mouth 2 times daily       hydrocortisone 1 % cream    CORTAID    30 g    Apply sparingly to affected area three times daily for 14 days.       hydrocortisone 2.5 % cream    ANUSOL-HC    30 g    Place rectally 2 times daily       lidocaine 5 % ointment    XYLOCAINE    142 g    One application 4 x daily to affected areas lower back and knees if necessary Profile Rx: patient will contact pharmacy when needed       loratadine 10 MG tablet    QC LORATADINE ALLERGY RELIEF    30 tablet    Take 1 tablet (10 mg) by mouth daily as needed       metoprolol 25 MG 24 hr tablet    TOPROL-XL    45 tablet    Take 0.5 tablets (12.5 mg) by mouth At Bedtime       omeprazole 20 MG tablet    [facility]LS OMPERAZOLE    30 tablet    Take 1 tablet (20 mg) by mouth daily       order for DME     1 each    Cane of any materials of adujustable priya *one  Use as directed*       polyethylene glycol powder    MIRALAX    510 g    Take 17 g (1 capful) by mouth daily       Vitamin D3 2000 UNITS Tabs     60 tablet    Take 2 tablets by mouth daily (with breakfast)

## 2017-06-15 NOTE — PROGRESS NOTES
SUBJECTIVE:                                                    Rody Gonzalez is a 77 year old female who presents to clinic today for the following health issues:  Health Maintenance Due   Topic Date Due     PNEUMOCOCCAL (2 of 2 - PCV13) 09/01/2007     TETANUS IMMUNIZATION (SYSTEM ASSIGNED)  09/01/2016     Health Maintenance reviewed at today's visit patient asked to schedule/complete:   Immunizations:  Patient declined      ears      Duration: not hearing as well as before    Description (location/character/radiation): both    Intensity:  moderate    Accompanying signs and symptoms: ears feel plugged, hears a constant humming    History (similar episodes/previous evaluation): None    Precipitating or alleviating factors: None    Therapies tried and outcome: None       Discuss labs from 6/1/2017      Duration: na    Description (location/character/radiation): na    Intensity:  na    Accompanying signs and symptoms: na    History (similar episodes/previous evaluation): None    Precipitating or alleviating factors: None    Therapies tried and outcome: None     has Health Care Home; Gastroesophageal reflux disease without esophagitis; DDD (degenerative disc disease), lumbar; Dysuria; Anxiety state; Urinary frequency; Tuberculosis of peripheral lymph nodes; Nonspecific abnormal results of thyroid function study; Pulmonary tuberculosis; PPD positive; Papanicolaou smear of cervix with atypical squamous cells of undetermined significance (ASC-US); Osteoporosis; Tear film insufficiency; Memory loss; Headache; Abdominal pain; Closed fracture of triquetral (cuneiform) bone of wrist; Other chronic pain; Abnormality of gait; Hearing loss; Malaise and fatigue; Acute cystitis; Major depression in complete remission (H); Sensory hearing loss, unilateral; Vitamin D deficiency disease; Hyperlipidemia LDL goal <160; Overweight (BMI 25.0-29.9); Risk for falls; ACP (advance care planning); Primary osteoarthritis of both knees; and  Tension headache on her problem list.  GASTROESOPHAGEAL REFLUX DISEASE WITHOUT ESOPHAGITIS   LUMBAR with RADICULOPATHY   HISTORY OF DYSURIA         .  Current Outpatient Prescriptions   Medication Sig Dispense Refill     ciprofloxacin-dexamethasone (CIPRODEX) otic suspension Place 4 drops into the right ear 2 times daily for 7 days 7.5 mL 0     order for DME Cane of any materials of adujustable priya  *one   Use as directed* 1 each 0     atorvastatin (LIPITOR) 10 MG tablet Take 1 tablet (10 mg) by mouth daily 30 tablet 11     omeprazole (KLS OMPERAZOLE) 20 MG tablet Take 1 tablet (20 mg) by mouth daily 30 tablet 11     acetaminophen (MAPAP) 325 MG tablet Take 1-2 tablets (325-650 mg) by mouth every 6 hours as needed for mild pain 180 tablet 11     metoprolol (TOPROL-XL) 25 MG 24 hr tablet Take 0.5 tablets (12.5 mg) by mouth At Bedtime 45 tablet 3     aspirin-acetaminophen-caffeine (EXCEDRIN MIGRAINE) 250-250-65 MG per tablet Take 1 tablet by mouth every 6 hours as needed for headaches 80 tablet 11     lidocaine (XYLOCAINE) 5 % ointment One application 4 x daily to affected areas lower back and knees if necessary  Profile Rx: patient will contact pharmacy when needed 142 g 11     alum & mag hydroxide-simethicone (MYLANTA ES/MAALOX  ES) 400-400-40 MG/5ML SUSP suspension Take 30 mLs by mouth 4 times daily as needed for indigestion 1 Bottle 11     polyethylene glycol (MIRALAX) powder Take 17 g (1 capful) by mouth daily 510 g 1     hydrocortisone (CORTAID) 1 % cream Apply sparingly to affected area three times daily for 14 days. 30 g 5     hydrocortisone (ANUSOL-HC) 2.5 % cream Place rectally 2 times daily 30 g 11     fluticasone (FLONASE) 50 MCG/ACT spray Spray 1 spray into both nostrils daily 16 g 11     Cholecalciferol (VITAMIN D3) 2000 UNITS TABS Take 2 tablets by mouth daily (with breakfast) 60 tablet 11     loratadine (QC LORATADINE ALLERGY RELIEF) 10 MG tablet Take 1 tablet (10 mg) by mouth daily as needed 30  tablet 11     aspirin 81 MG chewable tablet Take 1 tablet (81 mg) by mouth daily 108 tablet 3     glucosamine-chondroitinoitin ([facility] GLUCOSAMINE-CHONDROITIN) 500-400 MG TABS Take 2 tablets by mouth 2 times daily 120 tablet 11        No Known Allergies    Immunization History   Administered Date(s) Administered     Pneumococcal 23 valent 2006     Tdap (Adacel,Boostrix) 2006         reports that she does not drink alcohol.      reports that she does not use illicit drugs.    family history includes Family History Negative in her father and mother.    indicated that her mother is . She indicated that her father is .      has a past surgical history that includes Hysterectomy ().     reports that she does not currently engage in sexual activity.  .  Pediatric History   Patient Guardian Status     Not on file.     Other Topics Concern     Parent/Sibling W/ Cabg, Mi Or Angioplasty Before 65f 55m? No     Social History Narrative         reports that she has never smoked. She has never used smokeless tobacco.    Medical, social, surgical, and family histories reviewed.    Problem list, Medication list, Allergies, and Medical/Social/Surgical histories reviewed in [facility] and updated as appropriate.  Labs reviewed in [facility]  Patient Active Problem List   Diagnosis     Health Care Home     Gastroesophageal reflux disease without esophagitis     DDD (degenerative disc disease), lumbar     Dysuria     Anxiety state     Urinary frequency     Tuberculosis of peripheral lymph nodes     Nonspecific abnormal results of thyroid function study     Pulmonary tuberculosis     PPD positive     Papanicolaou smear of cervix with atypical squamous cells of undetermined significance (ASC-US)     Osteoporosis     Tear film insufficiency     Memory loss     Headache     Abdominal pain     Closed fracture of triquetral (cuneiform) bone of wrist     Other chronic pain     Abnormality of gait     Hearing loss     Malaise  and fatigue     Acute cystitis     Major depression in complete remission (H)     Sensory hearing loss, unilateral     Vitamin D deficiency disease     Hyperlipidemia LDL goal <160     Overweight (BMI 25.0-29.9)     Risk for falls     ACP (advance care planning)     Primary osteoarthritis of both knees     Tension headache     Past Surgical History:   Procedure Laterality Date     HYSTERECTOMY  2004       Social History   Substance Use Topics     Smoking status: Never Smoker     Smokeless tobacco: Never Used     Alcohol use No     Family History   Problem Relation Age of Onset     Family History Negative Mother      Family History Negative Father          No Known Allergies  Recent Labs   Lab Test  01/12/17   1510  04/13/16   0943  12/04/15   1530  04/10/15   1257  03/04/14   1050  09/13/13   1125   LDL   --   126*  125*  116  134*  120   HDL   --   41*  53  42*  42*  41*   TRIG   --   131  100  77  84  99   ALT   --    --   30  44  22   --    CR  0.63  0.70   --   0.80  0.70  0.70   GFRESTIMATED  >90  Non  GFR Calc    81   --   70  82  82   GFRESTBLACK  >90   GFR Calc    >90   --   85  >90  >90   POTASSIUM  4.2  4.0   --   4.7  4.5  4.4   TSH   --    --    --    --   1.47  1.86        BP Readings from Last 6 Encounters:   06/15/17 118/64   06/01/17 128/64   03/31/17 144/80   02/07/17 124/64   01/12/17 130/68   09/09/16 146/68       Wt Readings from Last 3 Encounters:   06/15/17 158 lb 8 oz (71.9 kg)   06/01/17 160 lb 8 oz (72.8 kg)   03/31/17 161 lb (73 kg)         Positive symptoms or findings indicated by bold designation:     ROS: 10 point ROS neg other than the symptoms noted above in the HPI.except  has Health Care Home; Gastroesophageal reflux disease without esophagitis; DDD (degenerative disc disease), lumbar; Dysuria; Anxiety state; Urinary frequency; Tuberculosis of peripheral lymph nodes; Nonspecific abnormal results of thyroid function study; Pulmonary tuberculosis; PPD  positive; Papanicolaou smear of cervix with atypical squamous cells of undetermined significance (ASC-US); Osteoporosis; Tear film insufficiency; Memory loss; Headache; Abdominal pain; Closed fracture of triquetral (cuneiform) bone of wrist; Other chronic pain; Abnormality of gait; Hearing loss; Malaise and fatigue; Acute cystitis; Major depression in complete remission (H); Sensory hearing loss, unilateral; Vitamin D deficiency disease; Hyperlipidemia LDL goal <160; Overweight (BMI 25.0-29.9); Risk for falls; ACP (advance care planning); Primary osteoarthritis of both knees; and Tension headache on her problem list.   Constitutional: The patient denied fatigue, fever, insomnia, night sweats, recent illness and weight loss.  WEIGHT IS STABLE      Eyes: The patient denied blindness, eye pain, eye tearing, photophobia, vision change and visual disturbance. NORMAL VISION       Ears/Nose/Throat/Neck: The patient denied dizziness, facial pain, hearing loss, nasal discharge, oral pain, otalgia, postnasal drip, sinus congestion, sore throat, tinnitus and voice change.   EAR ITCHING AND BUZZING IN EARS AND BALANCE ISSUES     Cardiovascular: The patient denied arrhythmia, chest pain/pressure, claudication, edema, exercise intolerance, fatigue, orthopnea, palpitations and syncope.  NORMAL      Respiratory: The patient denied asthma, chest congestion, cough, dyspnea on exertion, dyspnea/shortness of breath, hemoptysis, pedal edema, pleuritic pain, productive sputum, snoring and wheezing. NORMAL      Gastrointestinal: The patient denied abdominal pain, anorexia, constipation, diarrhea, dysphagia, gastroesophageal reflux, hematochezia, hemorrhoids, melena, nausea and vomiting . N left      Genitourinary/Nephrology: The patient denied breast complaint, dysuria, nocturia sexual dysfunction, t, urinary frequency, urinary incontinence, urinary urgency    NLL      Musculoskeletal: The patient denied arthralgia(s), back pain, joint  complaint, muscle weakness, myalgias, osteoporosis, sciatica, stiffness and swelling. LOWER BACK PAIN   AND OSTEOARTHRITIS KNEES  BILATERAL     Dermatoligic:: The patient denied acne, dermatitis, ecchymosis, itching, mole change, rash, skin cancer, skin lesion and sores.      Neurologic: The patient denied dizziness, gait abnormality, headache, memory loss, mental status change, paresis, paresthesia, seizure, syncope, tremor and vision change. NORMAL     Psychiatric: The patient denied anxiety, depression, disturbances of memory, drug abuse, insomnia, mood swings and relationship difficulties.  NORMAL      Endocrine: The patient denied , goiter, obesity, polyuria and thyroid disease.     Hematologic/Lymphatic: The patient denied abnormal bleeding and bruising, abnormal ecchymoses, anemia, lymph node enlargement/mass, petechiae and venous  Thrombosis.  NORMAL    Allergy/Immunology: The patient denied food allergy and  Allergic rhinitis or conjunctivitis.  NORMAL        PE:  /64  Pulse 54  Temp 98.5  F (36.9  C) (Tympanic)  Resp 16  Wt 158 lb 8 oz (71.9 kg)  SpO2 97%  BMI 26.79 kg/m2 Body mass index is 26.79 kg/(m^2).    Constitutional: general appearance, well nourished, well developed, in no acute distress, well developed, appears stated age, normal body habitus,  NORMAL      Eyes:; The patient has normal eyelids sclerae and conjunctivae : NORMAL       Ears/Nose/Throat: external ear, overall: normal appearance; external nose, overall: benign appearance, normal moujth gums and lips  The patient has:  NORMAL      Neck: thyroid, overall: normal size, normal consistency, nontender,  NORMAL      Respiratory:  palpation of chest, overall: normal excursion,  NORMAL    Clear to percussion and auscultation  NORMAL     Tachypnea  NORMAL   Color  NORMAL     Cardiovascular:  Good color with no peripheral edema  NORMAL    Regular sinus rhythm without murmur. Physiologic heart sounds Heart is unelarged  .    Chest/Breast: normal shape  NORMAL      Abdominal exam,  Liver and spleen are  unenlarged  NORMAL       Tenderness  NORMAL    Scars  NOT APPLICABLE      Urogenital; no renal, flank or bladder  tenderness;  NORMAL     Lymphatic: neck nodes,  NORMAL    Other nodes  NOT APPLICABLE      Musculoskeletal:  Brief ortho exam normal except:   DECREASE RANGE OF MOTION OF BACK   BILATERAL OSTEOARTHRITIS KNEE PAIN      Integument: inspection of skin, no rash, lesions; and, palpation, no induration, no tenderness.  NORMAL      Neurologic mental status, overall: alert and oriented; gait, no ataxia, no unsteadiness; coordination, no tremors; cranial nerves, overall: normal motor, overall: normal bulk, tone.  N;L      Psychiatric: orientation/consciousness, overall: oriented to person, place and time; behavior/psychomotor activity, no tics, normal psychomotor activity; mood and affect, overall: normal mood and affect; appearance, overall: well-groomed, good eye contact; speech, overall: normal quality, no aphasia and normal quality, quantity, intact.  NORMAL      Diagnostic Test Results:  Results for orders placed or performed in visit on 06/01/17   Albumin level   Result Value Ref Range    Albumin 4.1 3.4 - 5.0 g/dL   Erythrocyte sedimentation rate auto   Result Value Ref Range    Sed Rate 16 0 - 30 mm/h         ICD-10-CM    1. Tinnitus of right ear H93.11    2. Ear itching L29.9 ciprofloxacin-dexamethasone (CIPRODEX) otic suspension   3. Tendency to fall R29.6 order for DME   4. Hyperlipidemia LDL goal <160 E78.5 atorvastatin (LIPITOR) 10 MG tablet        KNEE EXERCISES        ICE TO KNEE 5 MINUTES PRIOR TO EXERCISE IF POSSIBLE    ISOMETRIC KNEE EXTENDED POSITION STANDING X 30 TWICE DAILY \    FLEXION OF KNEE ISOMETRIC 90 DEGREE FLEXION X 30 TWICE DAILY    WITH FIVE POUND WEIGHTS OR PURSE    SITTING EXTENDED KNEE  X 3O SECONDS TWICE DAILY    STANDING WITH KNEE FLEXED X 30 SECONDS TWICE DAILY    CLOSED CHAIN EXERCISE  ,THAT IS  ONE 1-2 INCH BOOK 30 REPS ON AND OFF THE BOOK OR PLATFORM     AFTER 2 WEEK INCREASE TO 3 INCHES    AFTER 4 WEEKS INCREASE TO 4 INCHES    WALL SITTING 30 SECONDS 45 DEGREES    AFTER 2 WEEKS 30 SECONDS 60 DEGREES    AFTER  4 WEEKS 30 SECONDS 90 DEGREES    BALANCE 30 SECONDS WITH OPPOSITE LEG AT 30 DEGREES AND ARM TO SIDE X 2 WEEKS    BALANCE 30 SECONDS WITH OPPOSITE LEG AT 60 DEGREES AND ARM TO SIDE X 2 WEEKS    REPEAT with OPPOSITE LEGS BALANCING       A    Assessment: Lower back pain    Plan: do these exercises at least twice daily:  Standing or sitting exercise can be done every two hours    Dallin' Flexion Versus Elizabeth   Extension Exercises For   Low Back Pain   Examples of Dallin' Flexion Exercises  1. Pelvic tilt.  Please press the small of your back against the floor.  Start with 5-10  and increase to 100 count over one month   2. Single Knee to chest. Lie on your back with legs in bent position. Alternate one leg and the other very slowly bringing the knee to chest.  Start with a count of 5-10   Over one month work up to 100  3. Double knee to chest.  Lie on your back with knees in bent position.  Bring both knees to the chest slowly hold for a count of 5-to 10. Over one month work to 100  4. Partial sit-up or crunch.  Lie on your back in bent leg position.  Please bring your body with arms crossed in front  To 30 degrees of flexion. Start with 5-10 over one month work up to 100 or more  5. Sit back.  Please sit on the side of the bed or a stair landing  And lie backwards until the abdominal muscles start to quiver.  Hold for a count of 5-10 and over a month work up to 100.  5. Hamstring stretch.  Please extend your legs while sitting on the floor as tolerated for 5-10 count.  Gradually increase to count of 30 over one month      Alternately find a stair landing or sturdy chair and place heel  In a comfortable level of extension  And stretch one hamstring at a time for 5-10 seconds.  Increase to count  of 30 over one month                         Squat.  Stand with legs comfortably apart and lower the body slowly by flexing the knees  for count of 5-10 over one month increase to 30.  Useful for anterior disc protrusion, facette joint arthritis spond-10ylolysis, spondylolisthesis and spinal stenosis    Trisha method or extension exercises  Useful disc bulging posteriorly  1. Prone pressups  Please lie on your abdomen.   Please do a push with the upper half of your body only.  This should not cause the pain to shoot down your buttock thigh leg or foot  Start with 10 and repeat x 3 one minute apart.  Repeat x 10 every 1-2 hours  Pain tends to increase in the center of back  And leaves buttock thighs legs and foot over time  You may shift your pelvis in opposite direction of buttock and leg pain to achieve even better results  2. Superman with arms extended  Or at the side Simultaneously lift your arms and legs off the floor. Start with 5-10 over one month increase to 100.  3. Cat stretch or cobra Start  As if in extended position of prone pressup but hold the stretch for 5 or 10 count. Over one moth to count of 30.  4.  Extensions can be done in standing position  As well if prone pressup is inconvenient.  Shift your pelvis in opposite direction of limb pain.  Put your hands  Flat on your buttocks and lean backwards without loss of balance.  Count 10 x 3 times then 10 extensions hourly       .    Side effects benefits and risks thoroughly discussed. .she may come in early if unimproved or getting worse          Importance of adhering to regimen discussed and if medications were dispensed, the importance of taking medications discussed and bringing in the medications after every visit for chronic problems         Please drink 2 glasses of water prior to meals and walk 15-30 minutes after meals    I spent  15 MINUTES   with patient discussing the following issues  NORMAL  The primary encounter diagnosis was Tinnitus  of right ear. Diagnoses of Ear itching, Tendency to fall, and Hyperlipidemia LDL goal <160 were also pertinent to this visit. over half of which involved counseling and coordination of care.    Patient Instructions   (H93.11) Tinnitus of right ear  (primary encounter diagnosis)  Comment:    Plan:      (L29.9) Ear itching  Comment:    Plan: ciprofloxacin-dexamethasone (CIPRODEX) otic         suspension             (R29.6) Tendency to fall  Comment:    Plan: order for DME             (E78.5) Hyperlipidemia LDL goal <160  Comment:    Plan: atorvastatin (LIPITOR) 10 MG tablet                 OFFERED ENT EVALUATION       ALL THE ABOVE PROBLEMS ARE STABLE AND MED CHANGES AS NOTED    Diet:  MEDITERRANEAN DIET     Exercise:  BALANCE AND FIT BIT TWITCH RECOMMENDED    Exercises Range of motion, balance, isometric, and strengthening exercises 30 repetitions twice daily of involved joints      .GAYLA VEGA MD 6/15/2017 5:57 PM  June 16, 2017

## 2017-06-15 NOTE — PATIENT INSTRUCTIONS
(H93.11) Tinnitus of right ear  (primary encounter diagnosis)  Comment:    Plan:      (L29.9) Ear itching  Comment:    Plan: ciprofloxacin-dexamethasone (CIPRODEX) otic         suspension             (R29.6) Tendency to fall  Comment:    Plan: order for DME             (E78.5) Hyperlipidemia LDL goal <160  Comment:    Plan: atorvastatin (LIPITOR) 10 MG tablet                     ICD-10-CM    1. Tinnitus of right ear H93.11    2. Ear itching L29.9 ciprofloxacin-dexamethasone (CIPRODEX) otic suspension   3. Tendency to fall R29.6 order for DME   4. Hyperlipidemia LDL goal <160 E78.5 atorvastatin (LIPITOR) 10 MG tablet        KNEE EXERCISES        ICE TO KNEE 5 MINUTES PRIOR TO EXERCISE IF POSSIBLE    ISOMETRIC KNEE EXTENDED POSITION STANDING X 30 TWICE DAILY \    FLEXION OF KNEE ISOMETRIC 90 DEGREE FLEXION X 30 TWICE DAILY    WITH FIVE POUND WEIGHTS OR PURSE    SITTING EXTENDED KNEE  X 3O SECONDS TWICE DAILY    STANDING WITH KNEE FLEXED X 30 SECONDS TWICE DAILY    CLOSED CHAIN EXERCISE  ,THAT IS ONE 1-2 INCH BOOK 30 REPS ON AND OFF THE BOOK OR PLATFORM     AFTER 2 WEEK INCREASE TO 3 INCHES    AFTER 4 WEEKS INCREASE TO 4 INCHES    WALL SITTING 30 SECONDS 45 DEGREES    AFTER 2 WEEKS 30 SECONDS 60 DEGREES    AFTER  4 WEEKS 30 SECONDS 90 DEGREES    BALANCE 30 SECONDS WITH OPPOSITE LEG AT 30 DEGREES AND ARM TO SIDE X 2 WEEKS    BALANCE 30 SECONDS WITH OPPOSITE LEG AT 60 DEGREES AND ARM TO SIDE X 2 WEEKS    REPEAT with OPPOSITE LEGS BALANCING       A    Assessment: Lower back pain    Plan: do these exercises at least twice daily:  Standing or sitting exercise can be done every two hours    Dallin' Flexion Versus Elizabeth   Extension Exercises For   Low Back Pain   Examples of Dallin' Flexion Exercises  1. Pelvic tilt.  Please press the small of your back against the floor.  Start with 5-10  and increase to 100 count over one month   2. Single Knee to chest. Lie on your back with legs in bent position. Alternate one leg  and the other very slowly bringing the knee to chest.  Start with a count of 5-10   Over one month work up to 100  3. Double knee to chest.  Lie on your back with knees in bent position.  Bring both knees to the chest slowly hold for a count of 5-to 10. Over one month work to 100  4. Partial sit-up or crunch.  Lie on your back in bent leg position.  Please bring your body with arms crossed in front  To 30 degrees of flexion. Start with 5-10 over one month work up to 100 or more  5. Sit back.  Please sit on the side of the bed or a stair landing  And lie backwards until the abdominal muscles start to quiver.  Hold for a count of 5-10 and over a month work up to 100.  5. Hamstring stretch.  Please extend your legs while sitting on the floor as tolerated for 5-10 count.  Gradually increase to count of 30 over one month      Alternately find a stair landing or sturdy chair and place heel  In a comfortable level of extension  And stretch one hamstring at a time for 5-10 seconds.  Increase to count of 30 over one month                         Squat.  Stand with legs comfortably apart and lower the body slowly by flexing the knees  for count of 5-10 over one month increase to 30.  Useful for anterior disc protrusion, facette joint arthritis spond-10ylolysis, spondylolisthesis and spinal stenosis    Trisha method or extension exercises  Useful disc bulging posteriorly  1. Prone pressups  Please lie on your abdomen.   Please do a push with the upper half of your body only.  This should not cause the pain to shoot down your buttock thigh leg or foot  Start with 10 and repeat x 3 one minute apart.  Repeat x 10 every 1-2 hours  Pain tends to increase in the center of back  And leaves buttock thighs legs and foot over time  You may shift your pelvis in opposite direction of buttock and leg pain to achieve even better results  2. Superman with arms extended  Or at the side Simultaneously lift your arms and legs off the floor.  Start with 5-10 over one month increase to 100.  3. Cat stretch or cobra Start  As if in extended position of prone pressup but hold the stretch for 5 or 10 count. Over one moth to count of 30.  4.  Extensions can be done in standing position  As well if prone pressup is inconvenient.  Shift your pelvis in opposite direction of limb pain.  Put your hands  Flat on your buttocks and lean backwards without loss of balance.  Count 10 x 3 times then 10 extensions hourly       .    Side effects benefits and risks thoroughly discussed. .she may come in early if unimproved or getting worse          Importance of adhering to regimen discussed and if medications were dispensed, the importance of taking medications discussed and bringing in the medications after every visit for chronic problems         Please drink 2 glasses of water prior to meals and walk 15-30 minutes after meals    I spent  15 MINUTES   with patient discussing the following issues  NORMAL  The primary encounter diagnosis was Tinnitus of right ear. Diagnoses of Ear itching, Tendency to fall, and Hyperlipidemia LDL goal <160 were also pertinent to this visit. over half of which involved counseling and coordination of care.    Patient Instructions   (H93.11) Tinnitus of right ear  (primary encounter diagnosis)  Comment:    Plan:      (L29.9) Ear itching  Comment:    Plan: ciprofloxacin-dexamethasone (CIPRODEX) otic         suspension             (R29.6) Tendency to fall  Comment:    Plan: order for DME             (E78.5) Hyperlipidemia LDL goal <160  Comment:

## 2017-06-23 ENCOUNTER — CARE COORDINATION (OUTPATIENT)
Dept: GERIATRIC MEDICINE | Facility: CLINIC | Age: 77
End: 2017-06-23

## 2017-06-23 NOTE — PROGRESS NOTES
"Received below notification today via [employer] Message in epic:  \"Rody Gonzalez admitted on 6/21 to [facility] due to abdominal pain.\"  Placed a call to [facility] and was told this member is no longer at [facility].  Placed a call to member and spoke to member's daughter, Eve who updated that member discharged around noon today. Eve reported that there was no change in medication and that member had \"issues with her colon.\"  Eve stated that they were told to follow up with PCP.  Discussed making a clinic appointment with PCP for ED follow up and Eve stated she will make clinic appointment for member for next week.  Eve stated that member is resting right now and Eve will call CM for updates or any request or further assistance.  Routing this message to PCP for update.  Johnna Malin, RN NAYE  [facility]   Phone:   [phone]  Fax:       [phone]        "

## 2017-07-18 ENCOUNTER — OFFICE VISIT (OUTPATIENT)
Dept: FAMILY MEDICINE | Facility: CLINIC | Age: 77
End: 2017-07-18
Payer: COMMERCIAL

## 2017-07-18 VITALS
RESPIRATION RATE: 16 BRPM | SYSTOLIC BLOOD PRESSURE: 128 MMHG | WEIGHT: 158 LBS | BODY MASS INDEX: 26.7 KG/M2 | TEMPERATURE: 98.9 F | OXYGEN SATURATION: 99 % | HEART RATE: 58 BPM | DIASTOLIC BLOOD PRESSURE: 64 MMHG

## 2017-07-18 DIAGNOSIS — K52.9 COLITIS: Primary | ICD-10-CM

## 2017-07-18 DIAGNOSIS — Z87.19 HISTORY OF BLOODY STOOLS: ICD-10-CM

## 2017-07-18 DIAGNOSIS — M17.0 PRIMARY OSTEOARTHRITIS OF BOTH KNEES: ICD-10-CM

## 2017-07-18 DIAGNOSIS — R29.6 TENDENCY TO FALL: ICD-10-CM

## 2017-07-18 DIAGNOSIS — K59.01 SLOW TRANSIT CONSTIPATION: ICD-10-CM

## 2017-07-18 DIAGNOSIS — E83.52 HYPERCALCEMIA: ICD-10-CM

## 2017-07-18 LAB
ERYTHROCYTE [DISTWIDTH] IN BLOOD BY AUTOMATED COUNT: 13.3 % (ref 10–15)
ERYTHROCYTE [SEDIMENTATION RATE] IN BLOOD BY WESTERGREN METHOD: 18 MM/H (ref 0–30)
HCT VFR BLD AUTO: 38.2 % (ref 35–47)
HGB BLD-MCNC: 12.7 G/DL (ref 11.7–15.7)
MCH RBC QN AUTO: 30 PG (ref 26.5–33)
MCHC RBC AUTO-ENTMCNC: 33.2 G/DL (ref 31.5–36.5)
MCV RBC AUTO: 90 FL (ref 78–100)
PLATELET # BLD AUTO: 175 10E9/L (ref 150–450)
RBC # BLD AUTO: 4.24 10E12/L (ref 3.8–5.2)
WBC # BLD AUTO: 5.4 10E9/L (ref 4–11)

## 2017-07-18 PROCEDURE — 85027 COMPLETE CBC AUTOMATED: CPT | Performed by: FAMILY MEDICINE

## 2017-07-18 PROCEDURE — 80053 COMPREHEN METABOLIC PANEL: CPT | Performed by: FAMILY MEDICINE

## 2017-07-18 PROCEDURE — 99214 OFFICE O/P EST MOD 30 MIN: CPT | Performed by: FAMILY MEDICINE

## 2017-07-18 PROCEDURE — 36415 COLL VENOUS BLD VENIPUNCTURE: CPT | Performed by: FAMILY MEDICINE

## 2017-07-18 PROCEDURE — 82306 VITAMIN D 25 HYDROXY: CPT | Performed by: FAMILY MEDICINE

## 2017-07-18 PROCEDURE — 85652 RBC SED RATE AUTOMATED: CPT | Performed by: FAMILY MEDICINE

## 2017-07-18 NOTE — LETTER
Rody LEANNE Gonzalez  [address]   [facility] [address]        July 20, 2017          Dear ,    We are writing to inform you of your test results.    NORMAL COMPLETE BLOOD PANEL WBCS RBCS AND PLATELETS   NORMAL ERTHROCYTE SEDIMENTATION RATE IE INFLAMMATORY MARKER   NORMAL VITAMIN D LEVEL   BORDERLINE  CALCIUM   NORMAL VITAMIN D LEVEL     Resulted Orders   Comprehensive metabolic panel   Result Value Ref Range    Sodium 141 133 - 144 mmol/L    Potassium 4.0 3.4 - 5.3 mmol/L    Chloride 110 (H) 94 - 109 mmol/L    Carbon Dioxide 24 20 - 32 mmol/L    Anion Gap 7 3 - 14 mmol/L    Glucose 76 70 - 99 mg/dL    Urea Nitrogen 12 7 - 30 mg/dL    Creatinine 0.67 0.52 - 1.04 mg/dL    GFR Estimate 85 >60 mL/min/1.7m2      Comment:      Non  GFR Calc    GFR Estimate If Black >90   GFR Calc   >60 mL/min/1.7m2    Calcium 10.5 (H) 8.5 - 10.1 mg/dL    Bilirubin Total 0.3 0.2 - 1.3 mg/dL    Albumin 3.8 3.4 - 5.0 g/dL    Protein Total 7.6 6.8 - 8.8 g/dL    Alkaline Phosphatase 78 40 - 150 U/L    ALT 25 0 - 50 U/L    AST 28 0 - 45 U/L   Vitamin D Deficiency   Result Value Ref Range    Vitamin D Deficiency screening 31 20 - 75 ug/L      Comment:      Season, race, dietary intake, and treatment affect the concentration of   25-hydroxy-Vitamin D. Values may decrease during winter months and increase   during summer months. Values 20-29 ug/L may indicate Vitamin D insufficiency   and values <20 ug/L may indicate Vitamin D deficiency.   Vitamin D determination is routinely performed by an immunoassay specific for   25 hydroxyvitamin D3.  If an individual is on vitamin D2 (ergocalciferol)   supplementation, please specify 25 OH vitamin D2 and D3 level determination   by   LCMSMS test VITD23.     CBC with platelets   Result Value Ref Range    WBC 5.4 4.0 - 11.0 10e9/L    RBC Count 4.24 3.8 - 5.2 10e12/L    Hemoglobin 12.7 11.7 - 15.7 g/dL    Hematocrit 38.2 35.0 - 47.0 %    MCV 90 78 -  100 fl    MCH 30.0 26.5 - 33.0 pg    MCHC 33.2 31.5 - 36.5 g/dL    RDW 13.3 10.0 - 15.0 %    Platelet Count 175 150 - 450 10e9/L   Erythrocyte sedimentation rate auto   Result Value Ref Range    Sed Rate 18 0 - 30 mm/h     Thank you very much for choosing [facility]. Please call my office if you have any questions or concerns.       Sincerely,    GAYLA VEGA MD

## 2017-07-18 NOTE — NURSING NOTE
"Chief Complaint   Patient presents with     Hospital F/U     colon bleeding       Initial /64  Pulse 58  Temp 98.9  F (37.2  C) (Tympanic)  Resp 16  Wt 158 lb (71.7 kg)  SpO2 99%  BMI 26.7 kg/m2 Estimated body mass index is 26.7 kg/(m^2) as calculated from the following:    Height as of 1/12/17: 5' 4.5\" (1.638 m).    Weight as of this encounter: 158 lb (71.7 kg).  Medication Reconciliation: complete   Janet Lunsford CMA    "

## 2017-07-18 NOTE — LETTER
Rody Gonzalez  [address]   [facility] [address]        July 19, 2017      Dear ,    We are writing to inform you of your test results.    NORMAL ERTHROCYTE SEDIMENTATION RATE IE INFLAMMATORY MARKER   NORMAL COMPLETE BLOOD PANEL WBCS RBCS AND PLATELETS     Resulted Orders   CBC with platelets   Result Value Ref Range    WBC 5.4 4.0 - 11.0 10e9/L    RBC Count 4.24 3.8 - 5.2 10e12/L    Hemoglobin 12.7 11.7 - 15.7 g/dL    Hematocrit 38.2 35.0 - 47.0 %    MCV 90 78 - 100 fl    MCH 30.0 26.5 - 33.0 pg    MCHC 33.2 31.5 - 36.5 g/dL    RDW 13.3 10.0 - 15.0 %    Platelet Count 175 150 - 450 10e9/L   Erythrocyte sedimentation rate auto   Result Value Ref Range    Sed Rate 18 0 - 30 mm/h     Thank you very much for choosing [facility]. Please call my office if you have any questions or concerns.       Sincerely,    GAYLA VEGA MD

## 2017-07-18 NOTE — PATIENT INSTRUCTIONS
(K52.9) Colitis  (primary encounter diagnosis)  Comment:    Plan: Comprehensive metabolic panel, CBC with         platelets, Erythrocyte sedimentation rate auto,        GASTROENTEROLOGY ADULT REF CONSULT ONLY             (K59.01) Slow transit constipation  Comment:    Plan:      (Z87.19) History of bloody stools  Comment:    Plan: GASTROENTEROLOGY ADULT REF CONSULT ONLY             (E83.52) Hypercalcemia  Comment:    Plan: Vitamin D Deficiency             (M17.0) Primary osteoarthritis of both knees  Comment:    Plan: order for DME             (R29.6) Tendency to fall  Comment:    Plan: order for DME

## 2017-07-18 NOTE — MR AVS SNAPSHOT
After Visit Summary   7/18/2017    Rody Gonzalez    MRN: [id number]           Patient Information     Date Of Birth          1940        Visit Information        Provider Department      7/18/2017 3:30 PM Osman Yen MD; [employer] [facility]        Today's Diagnoses     Colitis    -  1    Slow transit constipation        History of bloody stools        Hypercalcemia        Primary osteoarthritis of both knees        Tendency to fall          Care Instructions    (K52.9) Colitis  (primary encounter diagnosis)  Comment:    Plan: Comprehensive metabolic panel, CBC with         platelets, Erythrocyte sedimentation rate auto,        GASTROENTEROLOGY ADULT REF CONSULT ONLY             (K59.01) Slow transit constipation  Comment:    Plan:      (Z87.19) History of bloody stools  Comment:    Plan: GASTROENTEROLOGY ADULT REF CONSULT ONLY             (E83.52) Hypercalcemia  Comment:    Plan: Vitamin D Deficiency             (M17.0) Primary osteoarthritis of both knees  Comment:    Plan: order for DME             (R29.6) Tendency to fall  Comment:    Plan: order for DME                         Follow-ups after your visit        Additional Services     GASTROENTEROLOGY ADULT REF CONSULT ONLY       Preferred Location: [facility] [phone]  Prefers female colonoscopist       Please be aware that coverage of these services is subject to the terms and limitations of your health insurance plan.  Call member services at your health plan with any benefit or coverage questions.  Any procedures must be performed at a [place] facility OR coordinated by your clinic's referral office.    Please bring the following with you to your appointment:    (1) Any X-Rays, CTs or MRIs which have been performed.  Contact the facility where they were done to arrange for  prior to your scheduled appointment.    (2) List of current medications   (3) This  "referral request   (4) Any documents/labs given to you for this referral                  Follow-up notes from your care team     Return in about 4 weeks (around 8/15/2017).      Who to contact     If you have questions or need follow up information about today's clinic visit or your schedule please contact [facility] directly at [phone].  Normal or non-critical lab and imaging results will be communicated to you by MyChart, letter or phone within 4 business days after the clinic has received the results. If you do not hear from us within 7 days, please contact the clinic through [facility] or phone. If you have a critical or abnormal lab result, we will notify you by phone as soon as possible.  Submit refill requests through [employer] or call your pharmacy and they will forward the refill request to us. Please allow 3 business days for your refill to be completed.          Additional Information About Your Visit        [employer] Information     [employer] lets you send messages to your doctor, view your test results, renew your prescriptions, schedule appointments and more. To sign up, go to [email or web address] . Click on \"Log in\" on the left side of the screen, which will take you to the Welcome page. Then click on \"Sign up Now\" on the right side of the page.     You will be asked to enter the access code listed below, as well as some personal information. Please follow the directions to create your username and password.     Your access code is: [email or web address]  Expires: 2017 11:04 AM     Your access code will  in 90 days. If you need help or a new code, please call your [facility] clinic or [phone].        Care EveryWhere ID     This is your Care EveryWhere ID. This could be used by other organizations to access your [facility] medical records  [id number]        Your Vitals Were     Pulse Temperature Respirations Pulse Oximetry BMI (Body Mass Index)       58 98.9  F " (37.2  C) (Tympanic) 16 99% 26.7 kg/m2        Blood Pressure from Last 3 Encounters:   07/18/17 128/64   06/15/17 118/64   06/01/17 128/64    Weight from Last 3 Encounters:   07/18/17 158 lb (71.7 kg)   06/15/17 158 lb 8 oz (71.9 kg)   06/01/17 160 lb 8 oz (72.8 kg)              We Performed the Following     CBC with platelets     Comprehensive metabolic panel     Erythrocyte sedimentation rate auto     GASTROENTEROLOGY ADULT REF CONSULT ONLY     Vitamin D Deficiency          Today's Medication Changes          These changes are accurate as of: 7/18/17  4:09 PM.  If you have any questions, ask your nurse or doctor.               These medicines have changed or have updated prescriptions.        Dose/Directions    * order for DME   This may have changed:  Another medication with the same name was added. Make sure you understand how and when to take each.   Used for:  Tendency to fall   Changed by:  Osman Yen MD        Cane of any materials of adujustable priya *one  Use as directed*   Quantity:  1 each   Refills:  0       * order for DME   This may have changed:  You were already taking a medication with the same name, and this prescription was added. Make sure you understand how and when to take each.   Used for:  Primary osteoarthritis of both knees, Tendency to fall   Changed by:  Osman Yen MD        One cane of any material  One As directed adjustible if necessary   Quantity:  1 each   Refills:  0       * Notice:  This list has 2 medication(s) that are the same as other medications prescribed for you. Read the directions carefully, and ask your doctor or other care provider to review them with you.         Where to get your medicines      Some of these will need a paper prescription and others can be bought over the counter.  Ask your nurse if you have questions.     Bring a paper prescription for each of these medications     order for DME                Primary Care Provider  Office Phone # Fax #    Osman Myranda Yen [phone]       [facility] [address]  [facility] [address]        Equal Access to Services     JAYANT SEPULVEDA : Hadii aad ku hadevelyno Soomaali, waaxda luqadaha, qaybta kaalmada adeegyada, waxmyke ednain hayaan jayquentin escoto lapaxton rivera. So [facility] [phone].    ATENCIÓN: Si habla español, tiene a hdez disposición servicios gratuitos de asistencia lingüística. Llame al [phone].    We comply with applicable federal civil rights laws and [place] laws. We do not discriminate on the basis of race, color, national origin, age, disability sex, sexual orientation or gender identity.            Thank you!     Thank you for choosing [facility]  for your care. Our goal is always to provide you with excellent care. Hearing back from our patients is one way we can continue to improve our services. Please take a few minutes to complete the written survey that you may receive in the mail after your visit with us. Thank you!             Your Updated Medication List - Protect others around you: Learn how to safely use, store and throw away your medicines at www.disposemymeds.org.          This list is accurate as of: 7/18/17  4:09 PM.  Always use your most recent med list.                   Brand Name Dispense Instructions for use Diagnosis    acetaminophen 325 MG tablet    MAPAP    180 tablet    Take 1-2 tablets (325-650 mg) by mouth every 6 hours as needed for mild pain    Vitamin D insufficiency       alum & mag hydroxide-simethicone 400-400-40 MG/5ML Susp suspension    MYLANTA ES/MAALOX  ES    1 Bottle    Take 30 mLs by mouth 4 times daily as needed for indigestion    Gastroesophageal reflux disease without esophagitis       aspirin 81 MG chewable tablet     108 tablet    Take 1 tablet (81 mg) by mouth daily    Hyperlipidemia LDL goal <160       aspirin-acetaminophen-caffeine 250-250-65 MG per tablet     EXCEDRIN MIGRAINE    80 tablet    Take 1 tablet by mouth every 6 hours as needed for headaches    Tension headache       atorvastatin 10 MG tablet    LIPITOR    30 tablet    Take 1 tablet (10 mg) by mouth daily    Hyperlipidemia LDL goal <160       fluticasone 50 MCG/ACT spray    FLONASE    16 g    Spray 1 spray into both nostrils daily    Seasonal allergic rhinitis due to pollen       glucosamine-chondroitinoitin 500-400 MG Tabs    [facility] GLUCOSAMINE-CHONDROITIN    120 tablet    Take 2 tablets by mouth 2 times daily    Primary osteoarthritis of both knees       hydrocortisone 1 % cream    CORTAID    30 g    Apply sparingly to affected area three times daily for 14 days.    External hemorrhoids       hydrocortisone 2.5 % cream    ANUSOL-HC    30 g    Place rectally 2 times daily    External hemorrhoids       lidocaine 5 % ointment    XYLOCAINE    142 g    One application 4 x daily to affected areas lower back and knees if necessary Profile Rx: patient will contact pharmacy when needed    Osteoporosis       loratadine 10 MG tablet    QC LORATADINE ALLERGY RELIEF    30 tablet    Take 1 tablet (10 mg) by mouth daily as needed    Seasonal allergic rhinitis       metoprolol 25 MG 24 hr tablet    TOPROL-XL    45 tablet    Take 0.5 tablets (12.5 mg) by mouth At Bedtime    Episodic tension-type headache, not intractable, Essential hypertension, benign       omeprazole 20 MG tablet    KLS OMPERAZOLE    30 tablet    Take 1 tablet (20 mg) by mouth daily    Gastroesophageal reflux disease without esophagitis       * order for DME     1 each    Cane of any materials of adujustable priya *one  Use as directed*    Tendency to fall       * order for DME     1 each    One cane of any material  One As directed adjustible if necessary    Primary osteoarthritis of both knees, Tendency to fall       polyethylene glycol powder    MIRALAX    510 g    Take 17 g (1 capful) by mouth daily    Slow transit constipation       Vitamin D3 2000 UNITS  Tabs     60 tablet    Take 2 tablets by mouth daily (with breakfast)    Vitamin D insufficiency       * Notice:  This list has 2 medication(s) that are the same as other medications prescribed for you. Read the directions carefully, and ask your doctor or other care provider to review them with you.

## 2017-07-18 NOTE — PROGRESS NOTES
SUBJECTIVE:                                                    Rody Gonzalez is a 77 year old female who presents to clinic today for the following health issues:  Health Maintenance Due   Topic Date Due     PNEUMOCOCCAL (2 of 2 - PCV13) 09/01/2007     TETANUS IMMUNIZATION (SYSTEM ASSIGNED)  09/01/2016     Health Maintenance reviewed at today's visit patient asked to schedule/complete:   Immunizations:  Patient declined      Hospital Follow-up Visit:    Hospital/Nursing Home/IP Rehab Facility: [facility]   Date of Admission: 6/17/2017  Date of Discharge: 6/21/2017  Reason(s) for Admission: colon bleeding            Problems taking medications regularly:  Taking some, not others       Medication changes since discharge: None       Problems adhering to non-medication therapy:  None    Summary of hospitalization:  [facility] discharge summary reviewed  Diagnostic Tests/Treatments reviewed.  Follow up needed: none  Other Healthcare Providers Involved in Patient s Care:         None  Update since discharge: improved.     Post Discharge Medication Reconciliation:   Plan of care communicated with patient     Coding guidelines for this visit:  Type of Medical   Decision Making Face-to-Face Visit       within 7 Days of discharge Face-to-Face Visit        within 14 days of discharge   Moderate Complexity [id number]   High Complexity [id number]          WHITE BLOOD COUNT  8.0 4.5 - 11.0 thou/cu mm   RED BLOOD COUNT  3.86 (L) 4.00 - 5.20 mil/cu mm   HEMOGLOBIN  11.3 (L) 12.0 - 16.0 g/dL   HEMATOCRIT  34.2 33.0 - 51.0 %   MCV  89 80 - 100 fL   MCH  29.3 26.0 - 34.0 pg   MCHC  33.0 32.0 - 36.0 g/dL   RDW  13.3 11.5 - 15.5 %   PLATELET COUNT  129 (L) 140 - 440 thou/cu mm   MPV  10.7 6.5 - 11.0 fL   NEUTROPHILS  79.6 (H) 42.0 - 72.0 %   LYMPHOCYTES  12.5 (L) 20.0 - 44.0 %   MONOCYTES  7.5 <12.0 %   EOSINOPHILS  0.3 <8.0 %   BASOPHILS  0.0 <3.0 %   IMMATURE GRANULOCYTES(METAS,MYELOS,PROS) 0.1 %   ABSOLUTE NEUTROPHILS  6.3 1.7  - 7.0 thou/cu mm   ABSOLUTE LYMPHOCYTES  1.0 0.9 - 2.9 thou/cu mm   ABSOLUTE MONOCYTES  0.6 <0.9 thou/cu mm   ABSOLUTE EOSINOPHILS  0.0 <0.5 thou/cu mm   ABSOLUTE BASOPHILS  0.0 <0.3 thou/cu mm   ABSOLUTE IMMATURE GRANULOCYTES(METAS,MYELOS,PROS) 0.0 <0.3 thou/cu mm     CBC WITH AUTO DIFFERENTIAL (06/22/2017 6:15 AM)   Specimen Performing Laboratory   Blood [facility] LABORATORY-CEN[facility]AL LABORATORY    [address]    [place] [address]     Back to top of Results      Protime - INR (06/22/2017 6:15 AM)  Protime - INR (06/22/2017 6:15 AM)   Component Value Ref Range   INR 1.2 <1.3   PROTIME 15.4 (H) 11.7 - 14.1 sec     Protime - INR (06/22/2017 6:15 AM)   Specimen Performing Laboratory   Blood [facility] LAB[facility]ATORY-[facility]AL LABORAT[facility]    [address] [place] [id number]       Protime - INR (06/22/2017 6:15 AM)   Narrative             Therapeutic Range    2.0-3.0 for most anticoagulated patients    2.5-3.5 or 4.0 for high risk patients        The INR is only used for patients on stable oral anticoagulant therapy.     It makes no significant contribution to the diagnosis or treatment     of patients whose Protime is prolonged for other reasons.     Back to top of Results      CBC and Differential (06/22/2017 6:15 AM)  Only the most recent of 2 results within the time period is included.    CBC and Differential (06/22/2017 6:15 AM)   Specimen Performing Laboratory   Blood        CBC and Differential (06/22/2017 6:15 AM)   Narrative   The following orders were created for panel order CBC and Differential.    Procedure                               Abnormality         Status                       ---------                               -----------         ------                       CBC WITH AUTO DIFFERENTIAL[[id number]]   Abnormal            Final result                     Please view results for these tests on the individual orders.     Back to top of Results      Basic Metabolic  Panel (06/22/2017 6:15 AM)  Basic Metabolic Panel (06/22/2017 6:15 AM)   Component Value Ref Range   SODIUM 136 135 - 145 mmol/L   POTASSIUM 3.6 3.5 - 5.0 mmol/L   CHLORIDE 109 98 - 110 mmol/L   CO2,TOTAL 21 21 - 31 mmol/L   ANION GAP 6 5 - 18    GLUCOSE 112 (H) 65 - 100 mg/dL   CALCIUM 9.8 8.5 - 10.5 mg/dL   BUN 6 (L) 8 - 25 mg/dL   CREATININE 0.64 0.57 - 1.11 mg/dL   BUN/CREAT RATIO  9 (L) 10 - 20    GFR if African American >60 >60 ml/min/1.73m2   GFR if not African American >60 >60 ml/min/1.73m2     Basic Metabolic Panel (06/22/2017 6:15 AM)   Specimen Performing Laboratory   Blood [facility] LABORATORY-CENTRAL LABORATORY    [address]    [place] [address]     Back to top of Results      URINALYSIS MICROSCOPIC (06/21/2017 8:11 PM)  URINALYSIS MICROSCOPIC (06/21/2017 8:11 PM)   Component Value Ref Range   RBC  (A) 0-2, None Seen /HPF   WBC 3-5 0-2, 3-5, None Seen /HPF   BACTERIA  Few None Seen, Few Bacteria/HPF   EPITHELIAL CELLS  Few None Seen, Few Epi/HPF   HYALINE CASTS 3-5 0-2, 3-5 /LPF      URINALYSIS MICROSCOPIC (06/21/2017 8:11 PM)   Specimen Performing Laboratory   Urine [facility] LABORATORY-CEN[facility]AL LABORAT[facility]    [address]    [place] [address]     Back to top of Results      UA - Urinalysis with Reflex Micro (06/21/2017 8:11 PM)  UA - Urinalysis with Reflex Micro (06/21/2017 8:11 PM)   Component Value Ref Range   COLOR  Yellow Yellow Color   CLARITY  Clear Clear Clarity   SPECIFIC GRAVITY,URINE  1.020 1.010, 1.015, 1.020, 1.025    PH,URINE  7.5 6.0, 7.0, 8.0, 5.5, 6.5, 7.5, 8.5    UROBILINOGEN,QUALITATIVE  Normal Normal EU/dl   PROTEIN, URINE Trace (A) Negative mg/dL   GLUCOSE, URINE Negative Negative mg/dL   KETONES,URINE  Negative Negative mg/dL   BILIRUBIN,URINE  Negative Negative    OCCULT BLOOD,URINE  Moderate (A) Negative    NITRITE  Negative Negative    LEUKOCYTE ESTERASE  Small (A) Negative      UA - Urinalysis with Reflex Micro (06/21/2017  8:11 PM)   Specimen Performing Laboratory   Urine [facility] LABORATORY-CENTRAL LABORATORY    [address]    [place] [address]     Back to top of Results      CT ABDOMEN PELVIS W (06/21/2017 7:27 PM)  CT ABDOMEN PELVIS W (06/21/2017 7:27 PM)   Narrative   HISTORY:    Lower abdominal pain.  Bright red blood per rectum.        Technique:    CT abdomen and pelvis with IV contrast. No oral contrast.         Comparison:    None.        Findings:    Abdomen:  Diffusely decreased attenuation of the liver. Multiple subcentimeter low-attenuation lesions in both lobes of the liver are too small to characterize.  No bile duct dilation.  Gallbladder is unremarkable.  Pancreatic duct is upper limits of normal for caliber.  No pancreatic mass.  No peripancreatic inflammatory changes.  No spleen lesions.  Spleen is normal size.   No adrenal nodules are thickening.  Kidneys enhance symmetrically.  Several left renal cysts.  Additional subcentimeter low-attenuation lesions in both kidneys are too small to characterize but are likely cysts.  No hydronephrosis.        Wall thickening of the mid and distal descending colon and proximal sigmoid colon with pericolonic fat stranding.  No significant colonic diverticulosis. No dilated bowel.  No ascites.  No free intraperitoneal gas.  No lymphadenopathy.  Celiac axis, SMA, bilateral renal arteries are patent.  JOSE appears narrowed at the ostium but otherwise patent.  No abdominal aortic aneurysm.  Main portal vein and splenic vein are patent.        Pelvis:  No free fluid.  No lymphadenopathy.        Musculoskeletal:  Generalized osteopenia.  Degenerative changes in the spine.  Mild anterior wedging compression deformity of T11 vertebral body is age indeterminate.        Lower chest:  Mild atelectasis in the lung bases.          Impression:    1. Colitis involving the mid and distal descending colon and proximal sigmoid colon. Finding is nonspecific in terms of  etiology, however proximal JOSE appears narrowed raising the possibility of ischemic colitis. No pneumatosis, portal venous gas, or free intraperitoneal gas.    2. Fatty infiltration of the liver.        Please note that all CT scans at this facility use dose modulation, iterative reconstruction, and/or weight-based dosing when appropriate to reduce radiation dose to as low as reasonably achievable.        Dictated by Chun Syed MD @ Jun 21 2017  7:49PM        Signed by Dr. Chun Syed @ Jun 21 2017  7:49PM       CT ABDOMEN PELVIS W (06/21/2017 7:27 PM)   Procedure Note   Interface, Powerscribe - 06/21/2017 7:50 PM CDT    HISTORY:  Lower abdominal pain. Bright red blood per rectum.    Technique:  CT abdomen and pelvis with IV contrast. No oral contrast.     Comparison:  None.    Findings:  Abdomen: Diffusely decreased attenuation of the liver. Multiple subcentimeter low-attenuation lesions in both lobes of the liver are too small to characterize. No bile duct dilation. Gallbladder is unremarkable. Pancreatic duct is upper limits of normal for caliber. No pancreatic mass. No peripancreatic inflammatory changes. No spleen lesions. Spleen is normal size. No adrenal nodules are thickening. Kidneys enhance symmetrically. Several left renal cysts. Additional subcentimeter low-attenuation lesions in both kidneys are too small to characterize but are likely cysts. No hydronephrosis.    Wall thickening of the mid and distal descending colon and proximal sigmoid colon with pericolonic fat stranding. No significant colonic diverticulosis. No dilated bowel. No ascites. No free intraperitoneal gas. No lymphadenopathy. Celiac axis, SMA, bilateral renal arteries are patent. JOSE appears narrowed at the ostium but otherwise patent. No abdominal aortic aneurysm. Main portal vein and splenic vein are patent.    Pelvis: No free fluid. No lymphadenopathy.    Musculoskeletal: Generalized osteopenia. Degenerative changes in the  spine. Mild anterior wedging compression deformity of T11 vertebral body is age indeterminate.    Lower chest: Mild atelectasis in the lung bases.     Impression:  1. Colitis involving the mid and distal descending colon and proximal sigmoid colon. Finding is nonspecific in terms of etiology, however proximal JOSE appears narrowed raising the possibility of ischemic colitis. No pneumatosis, portal venous gas, or free intraperitoneal gas.  2. Fatty infiltration of the liver.    Please note that all CT scans at this facility use dose modulation, iterative reconstruction, and/or weight-based dosing when appropriate to reduce radiation dose to as low as reasonably achievable.    Dictated by Chun Syed MD @ Jun 21 2017 7:49PM    Signed by Dr. Chun Syed @ Jun 21 2017 7:49PM     Back to top of Results      EXTRA TUBE RED (06/21/2017 6:10 PM)  EXTRA TUBE RED (06/21/2017 6:10 PM)   Specimen Performing Laboratory   Blood [facility] LABORATORY-CENTRAL LABORATORY    [address]    [place] [address]     Back to top of Results      EXTRA TUBE BLUE (06/21/2017 6:10 PM)  EXTRA TUBE BLUE (06/21/2017 6:10 PM)   Specimen Performing Laboratory   Blood [facility] LABORATORY-CENTRAL LABORATORY    [address]    [place] [address]     Back to top of Results      COMP METABOLIC PANEL (06/21/2017 6:10 PM)  COMP METABOLIC PANEL (06/21/2017 6:10 PM)   Component Value Ref Range   SODIUM 136 135 - 145 mmol/L   POTASSIUM 3.7 3.5 - 5.0 mmol/L   CHLORIDE 105 98 - 110 mmol/L   CO2,TOTAL 23 21 - 31 mmol/L   ANION GAP 8 5 - 18    GLUCOSE 99 65 - 100 mg/dL   CALCIUM 11.1 (H) 8.5 - 10.5 mg/dL   BUN 10 8 - 25 mg/dL   CREATININE 0.70 0.57 - 1.11 mg/dL   BUN/CREAT RATIO  14 10 - 20    GFR if African American >60 >60 ml/min/1.73m2   GFR if not African American >60 >60 ml/min/1.73m2   ALBUMIN 4.1 3.2 - 4.6 g/dL   PROTEIN,TOTAL 7.5 6.0 - 8.0 g/dL   GLOBULIN  3.4 2.0 - 3.7 g/dL   A/G RATIO 1.2 1.0 - 2.0     BILIRUBIN,TOTAL 0.6 0.2 - 1.2 mg/dL   ALK PHOSPHATASE 74 50 - 136 IU/L   ALT (SGPT) 19 8 - 45 IU/L   AST (SGOT) 28 2 - 40 IU/L     COMP METABOLIC PANEL (06/21/2017 6:10 PM)   Specimen Performing Laboratory   Blood [facility] LABORATORY-CENTRAL LABORATORY    [address]    [place] [address]       .GAYLA VEGA MD .7/18/2017 5:12 PM .July 18, 2017        .  Current Outpatient Prescriptions   Medication Sig Dispense Refill     order for DME One cane of any material   One  As directed  adjustible if necessary 1 each 0     omeprazole (KLS OMPERAZOLE) 20 MG tablet Take 1 tablet (20 mg) by mouth daily 30 tablet 11     acetaminophen (MAPAP) 325 MG tablet Take 1-2 tablets (325-650 mg) by mouth every 6 hours as needed for mild pain 180 tablet 11     lidocaine (XYLOCAINE) 5 % ointment One application 4 x daily to affected areas lower back and knees if necessary  Profile Rx: patient will contact pharmacy when needed 142 g 11     Cholecalciferol (VITAMIN D3) 2000 UNITS TABS Take 2 tablets by mouth daily (with breakfast) 60 tablet 11     order for DME Cane of any materials of adujustable priya  *one   Use as directed* (Patient not taking: Reported on 7/18/2017) 1 each 0     atorvastatin (LIPITOR) 10 MG tablet Take 1 tablet (10 mg) by mouth daily (Patient not taking: Reported on 7/18/2017) 30 tablet 11     metoprolol (TOPROL-XL) 25 MG 24 hr tablet Take 0.5 tablets (12.5 mg) by mouth At Bedtime (Patient not taking: Reported on 7/18/2017) 45 tablet 3     aspirin-acetaminophen-caffeine (EXCEDRIN MIGRAINE) 250-250-65 MG per tablet Take 1 tablet by mouth every 6 hours as needed for headaches (Patient not taking: Reported on 7/18/2017) 80 tablet 11     alum & mag hydroxide-simethicone (MYLANTA ES/MAALOX  ES) 400-400-40 MG/5ML SUSP suspension Take 30 mLs by mouth 4 times daily as needed for indigestion (Patient not taking: Reported on 7/18/2017) 1 Bottle 11     polyethylene glycol (MIRALAX) powder Take 17 g  (1 capful) by mouth daily (Patient not taking: Reported on 2017) 510 g 1     hydrocortisone (CORTAID) 1 % cream Apply sparingly to affected area three times daily for 14 days. (Patient not taking: Reported on 2017) 30 g 5     hydrocortisone (ANUSOL-HC) 2.5 % cream Place rectally 2 times daily (Patient not taking: Reported on 2017) 30 g 11     fluticasone (FLONASE) 50 MCG/ACT spray Spray 1 spray into both nostrils daily (Patient not taking: Reported on 2017) 16 g 11     loratadine (QC LORATADINE ALLERGY RELIEF) 10 MG tablet Take 1 tablet (10 mg) by mouth daily as needed (Patient not taking: Reported on 2017) 30 tablet 11     aspirin 81 MG chewable tablet Take 1 tablet (81 mg) by mouth daily (Patient not taking: Reported on 2017) 108 tablet 3     glucosamine-chondroitinoitin ([facility] GLUCOSAMINE-CHONDROITIN) 500-400 MG TABS Take 2 tablets by mouth 2 times daily (Patient not taking: Reported on 2017) 120 tablet 11        No Known Allergies    Immunization History   Administered Date(s) Administered     Pneumococcal 23 valent 2006     Tdap (Adacel,Boostrix) 2006         reports that she does not drink alcohol.      reports that she does not use illicit drugs.    family history includes Family History Negative in her father and mother.    indicated that her mother is . She indicated that her father is .      has a past surgical history that includes Hysterectomy ().     reports that she does not currently engage in sexual activity.  .  Pediatric History   Patient Guardian Status     Not on file.     Other Topics Concern     Parent/Sibling W/ Cabg, Mi Or Angioplasty Before 65f 55m? No     Social History Narrative         reports that she has never smoked. She has never used smokeless tobacco.    Medical, social, surgical, and family histories reviewed.    Labs reviewed in EPIC  Patient Active Problem List   Diagnosis     Health Care Home     Gastroesophageal  reflux disease without esophagitis     DDD (degenerative disc disease), lumbar     Dysuria     Anxiety state     Urinary frequency     Tuberculosis of peripheral lymph nodes     Nonspecific abnormal results of thyroid function study     Pulmonary tuberculosis     PPD positive     Papanicolaou smear of cervix with atypical squamous cells of undetermined significance (ASC-US)     Osteoporosis     Tear film insufficiency     Memory loss     Headache     Abdominal pain     Closed fracture of triquetral (cuneiform) bone of wrist     Other chronic pain     Abnormality of gait     Hearing loss     Malaise and fatigue     Acute cystitis     Major depression in complete remission (H)     Sensory hearing loss, unilateral     Vitamin D deficiency disease     Hyperlipidemia LDL goal <160     Overweight (BMI 25.0-29.9)     Risk for falls     ACP (advance care planning)     Primary osteoarthritis of both knees     Tension headache     History of bloody stools     Past Surgical History:   Procedure Laterality Date     HYSTERECTOMY  2004       Social History   Substance Use Topics     Smoking status: Never Smoker     Smokeless tobacco: Never Used     Alcohol use No     Family History   Problem Relation Age of Onset     Family History Negative Mother      Family History Negative Father          No Known Allergies  Recent Labs   Lab Test  01/12/17   1510  04/13/16   0943  12/04/15   1530  04/10/15   1257  03/04/14   1050  09/13/13   1125   LDL   --   126*  125*  116  134*  120   HDL   --   41*  53  42*  42*  41*   TRIG   --   131  100  77  84  99   ALT   --    --   30  44  22   --    CR  0.63  0.70   --   0.80  0.70  0.70   GFRESTIMATED  >90  Non  GFR Calc    81   --   70  82  82   GFRESTBLACK  >90   GFR Calc    >90   --   85  >90  >90   POTASSIUM  4.2  4.0   --   4.7  4.5  4.4   TSH   --    --    --    --   1.47  1.86        BP Readings from Last 6 Encounters:   07/18/17 128/64   06/15/17 118/64    06/01/17 128/64   03/31/17 144/80   02/07/17 124/64   01/12/17 130/68       Wt Readings from Last 3 Encounters:   07/18/17 158 lb (71.7 kg)   06/15/17 158 lb 8 oz (71.9 kg)   06/01/17 160 lb 8 oz (72.8 kg)         Positive symptoms or findings indicated by bold designation:     ROS: 10 point ROS neg other than the symptoms noted above in the HPI.except  has Health Care Home; Gastroesophageal reflux disease without esophagitis; DDD (degenerative disc disease), lumbar; Dysuria; Anxiety state; Urinary frequency; Tuberculosis of peripheral lymph nodes; Nonspecific abnormal results of thyroid function study; Pulmonary tuberculosis; PPD positive; Papanicolaou smear of cervix with atypical squamous cells of undetermined significance (ASC-US); Osteoporosis; Tear film insufficiency; Memory loss; Headache; Abdominal pain; Closed fracture of triquetral (cuneiform) bone of wrist; Other chronic pain; Abnormality of gait; Hearing loss; Malaise and fatigue; Acute cystitis; Major depression in complete remission (H); Sensory hearing loss, unilateral; Vitamin D deficiency disease; Hyperlipidemia LDL goal <160; Overweight (BMI 25.0-29.9); Risk for falls; ACP (advance care planning); Primary osteoarthritis of both knees; Tension headache; and History of bloody stools on her problem list.   Constitutional: The patient denied fatigue, fever, insomnia, night sweats, recent illness and weight loss.  NORMAL     Eyes: The patient denied blindness, eye pain, eye tearing, photophobia, vision change and visual disturbance. NORMAL       Ears/Nose/Throat/Neck: The patient denied dizziness, facial pain, hearing loss, nasal discharge, oral pain, otalgia, postnasal drip, sinus congestion, sore throat, tinnitus and voice change.   NORMAL     Cardiovascular: The patient denied arrhythmia, chest pain/pressure, claudication, edema, exercise intolerance, fatigue, orthopnea, palpitations and syncope.  NORMAL     Respiratory: The patient denied asthma,  chest congestion, cough, dyspnea on exertion, dyspnea/shortness of breath, hemoptysis, pedal edema, pleuritic pain, productive sputum, snoring and wheezing. NORMAL      Gastrointestinal: The patient denied abdominal pain, anorexia, constipation, diarrhea, dysphagia, gastroesophageal reflux, hematochezia, hemorrhoids, melena, nausea and vomiting . NORMAL   HISTORY OF HEMATOCHEZIA AND DIFFUSE COLITIS   OVERDUE FOR FOLLOW UP      Genitourinary/Nephrology: The patient denied breast complaint, dysuria, nocturia sexual dysfunction, t, urinary frequency, urinary incontinence, urinary urgency    NORMAL     Musculoskeletal: The patient denied arthralgia(s), back pain, joint complaint, muscle weakness, myalgias, osteoporosis, sciatica, stiffness and swelling.  OSTEOARTHRITIS AND LOWER BACK PAIN IMPROVEED     Dermatoligic:: The patient denied acne, dermatitis, ecchymosis, itching, mole change, rash, skin cancer, skin lesion and sores.  NORMAL      Neurologic: The patient denied dizziness, gait abnormality, headache, memory loss, mental status change, paresis, paresthesia, seizure, syncope, tremor and vision change. NORMAL      Psychiatric: The patient denied anxiety, depression, disturbances of memory, drug abuse, insomnia, mood swings and relationship difficulties. N    Endocrine: The patient denied , goiter, obesity, polyuria and thyroid disease.      Hematologic/Lymphatic: The patient denied abnormal bleeding and bruising, abnormal ecchymoses, anemia, lymph node enlargement/mass, petechiae and venous  Thrombosis.      Allergy/Immunology: The patient denied food allergy and  Allergic rhinitis or conjunctivitis.        PE:  /64  Pulse 58  Temp 98.9  F (37.2  C) (Tympanic)  Resp 16  Wt 158 lb (71.7 kg)  SpO2 99%  BMI 26.7 kg/m2 Body mass index is 26.7 kg/(m^2).    Constitutional: general appearance, well nourished, well developed, in no acute distress, well developed, appears stated age, normal body habitus,       Eyes:; The patient has normal eyelids sclerae and conjunctivae :      Ears/Nose/Throat: external ear, overall: normal appearance; external nose, overall: benign appearance, normal moujth gums and lips  The patient has:      Neck: thyroid, overall: normal size, normal consistency, nontender,      Respiratory:  palpation of chest, overall: normal excursion,  WITHIN NORMAL LIMITS    Clear to percussion and auscultation  NORMAL      Tachypnea  NORMAL  Color  NORMAL     Cardiovascular:  Good color with no peripheral edema  NORMAL    Regular sinus rhythm without murmur. Physiologic heart sounds Heart is unelarged  .   Chest/Breast: normal shape  NORMAL       Abdominal exam,  Liver and spleen are  unenlarged  NORMAL        Tenderness  NORMAL   Scars  NORMAL     Urogenital; no renal, flank or bladder  tenderness;  NORMAL      Lymphatic: neck nodes,  NORMAL    Other nodes NOT APPLICABLE      Musculoskeletal:  Brief ortho exam normal except:   OSTEOARTHRITIS KNEES AND LOWER BACK PAIN     Integument: inspection of skin, no rash, lesions; and, palpation, no induration, no tenderness.  NORMAL     Neurologic mental status, overall: alert and oriented; gait, no ataxia, no unsteadiness; coordination, no tremors; cranial nerves, overall: normal motor, overall: normal bulk, tone.  NORMAL     Psychiatric: orientation/consciousness, overall: oriented to person, place and time; behavior/psychomotor activity, no tics, normal psychomotor activity; mood and affect, overall: normal mood and affect; appearance, overall: well-groomed, good eye contact; speech, overall: normal quality, no aphasia and normal quality, quantity, intact.  NORMAL     Diagnostic Test Results:  Results for orders placed or performed in visit on 07/18/17   CBC with platelets   Result Value Ref Range    WBC 5.4 4.0 - 11.0 10e9/L    RBC Count 4.24 3.8 - 5.2 10e12/L    Hemoglobin 12.7 11.7 - 15.7 g/dL    Hematocrit 38.2 35.0 - 47.0 %    MCV 90 78 - 100 fl    MCH 30.0  26.5 - 33.0 pg    MCHC 33.2 31.5 - 36.5 g/dL    RDW 13.3 10.0 - 15.0 %    Platelet Count 175 150 - 450 10e9/L   Erythrocyte sedimentation rate auto   Result Value Ref Range    Sed Rate 18 0 - 30 mm/h         ICD-10-CM    1. Colitis K52.9 Comprehensive metabolic panel     CBC with platelets     Erythrocyte sedimentation rate auto     GASTROENTEROLOGY ADULT REF CONSULT ONLY   2. Slow transit constipation K59.01    3. History of bloody stools Z87.19 GASTROENTEROLOGY ADULT REF CONSULT ONLY   4. Hypercalcemia E83.52 Vitamin D Deficiency   5. Primary osteoarthritis of both knees M17.0 order for DME   6. Tendency to fall R29.6 order for DME        .    Side effects benefits and risks thoroughly discussed. .she may come in early if unimproved or getting worse          Importance of adhering to regimen discussed and if medications were dispensed, the importance of taking medications discussed and bringing in the medications after every visit for chronic problems         Please drink 2 glasses of water prior to meals and walk 15-30 minutes after meals    I spent  25 MINUTES SPENT  with patient discussing the following issues   The primary encounter diagnosis was Colitis. Diagnoses of Slow transit constipation, History of bloody stools, Hypercalcemia, Primary osteoarthritis of both knees, and Tendency to fall were also pertinent to this visit. over half of which involved counseling and coordination of care.    Patient Instructions   (K52.9) Colitis  (primary encounter diagnosis)  Comment:    Plan: Comprehensive metabolic panel, CBC with         platelets, Erythrocyte sedimentation rate auto,        GASTROENTEROLOGY ADULT REF CONSULT ONLY             (K59.01) Slow transit constipation  Comment:    Plan:      (Z87.19) History of bloody stools  Comment:    Plan: GASTROENTEROLOGY ADULT REF CONSULT ONLY             (E83.52) Hypercalcemia  Comment:    Plan: Vitamin D Deficiency             (M17.0) Primary osteoarthritis of both  knees  Comment:    Plan: order for DME             (R29.6) Tendency to fall  Comment:    Plan: order for DME                       ALL THE ABOVE PROBLEMS ARE STABLE AND MED CHANGES AS NOTED    Diet:  MEDITERRANEAN DIET     Exercise:  FIT BIT TWITCH RECOMMENDED  AND KNEE PAIN AND LOWER BACK PAIN   Exercises Range of motion, balance, isometric, and strengthening exercises 30 repetitions twice daily of involved joints      .GAYLA VEGA MD 7/18/2017 5:12 PM  July 18, 2017

## 2017-07-19 LAB
ALBUMIN SERPL-MCNC: 3.8 G/DL (ref 3.4–5)
ALP SERPL-CCNC: 78 U/L (ref 40–150)
ALT SERPL W P-5'-P-CCNC: 25 U/L (ref 0–50)
ANION GAP SERPL CALCULATED.3IONS-SCNC: 7 MMOL/L (ref 3–14)
AST SERPL W P-5'-P-CCNC: 28 U/L (ref 0–45)
BILIRUB SERPL-MCNC: 0.3 MG/DL (ref 0.2–1.3)
BUN SERPL-MCNC: 12 MG/DL (ref 7–30)
CALCIUM SERPL-MCNC: 10.5 MG/DL (ref 8.5–10.1)
CHLORIDE SERPL-SCNC: 110 MMOL/L (ref 94–109)
CO2 SERPL-SCNC: 24 MMOL/L (ref 20–32)
CREAT SERPL-MCNC: 0.67 MG/DL (ref 0.52–1.04)
DEPRECATED CALCIDIOL+CALCIFEROL SERPL-MC: 31 UG/L (ref 20–75)
GFR SERPL CREATININE-BSD FRML MDRD: 85 ML/MIN/1.7M2
GLUCOSE SERPL-MCNC: 76 MG/DL (ref 70–99)
POTASSIUM SERPL-SCNC: 4 MMOL/L (ref 3.4–5.3)
PROT SERPL-MCNC: 7.6 G/DL (ref 6.8–8.8)
SODIUM SERPL-SCNC: 141 MMOL/L (ref 133–144)

## 2017-07-19 NOTE — PROGRESS NOTES
Please send normal lab letter when labs are complete  NORMAL ERTHROCYTE SEDIMENTATION RATE IE INFLAMMATORY MARKER   NORMAL COMPLETE BLOOD PANEL WBCS RBCS AND PLATELETS   GAYLA VEGA JR., MD

## 2017-07-19 NOTE — PROGRESS NOTES
Please send normal lab letter when labs are complete  NORMAL COMPLETE BLOOD PANEL WBCS RBCS AND PLATELETS   NORMAL ERTHROCYTE SEDIMENTATION RATE IE INFLAMMATORY MARKER   NORMAL VITAMIN D LEVEL   BORDERLINE  CALCIUM  NORMAL VITAMIN D LEVEL   GAYLA VEGA JR., MD

## 2017-08-28 ENCOUNTER — CARE COORDINATION (OUTPATIENT)
Dept: GERIATRIC MEDICINE | Facility: CLINIC | Age: 77
End: 2017-08-28

## 2017-08-28 NOTE — PROGRESS NOTES
[facility] Six-Month Telephone Assessment    6 month telephone assessment completed on 08/28/17.    ER visits: No  Hospitalizations: No  TCU stays: No  Significant health status changes: N/A  Falls/Injuries: No  ADL/IADL changes: No  Changes in services: No    Caregiver Assessment follow up:  N/A    Goals: See POC in chart for goal progress documentation.     Will see client in 6 months for an annual health risk assessment.   Encouraged client to call CM with any questions or concerns in the meantime.    Johnna Malin RN PHN  [facility]   Phone:   [phone]  Fax:       [phone]

## 2017-09-14 ENCOUNTER — OFFICE VISIT (OUTPATIENT)
Dept: FAMILY MEDICINE | Facility: CLINIC | Age: 77
End: 2017-09-14
Payer: COMMERCIAL

## 2017-09-14 VITALS
DIASTOLIC BLOOD PRESSURE: 68 MMHG | BODY MASS INDEX: 26.96 KG/M2 | WEIGHT: 159.5 LBS | OXYGEN SATURATION: 95 % | RESPIRATION RATE: 16 BRPM | TEMPERATURE: 98.7 F | SYSTOLIC BLOOD PRESSURE: 128 MMHG | HEART RATE: 59 BPM

## 2017-09-14 DIAGNOSIS — G44.229 CHRONIC TENSION-TYPE HEADACHE, NOT INTRACTABLE: ICD-10-CM

## 2017-09-14 DIAGNOSIS — I10 ESSENTIAL HYPERTENSION, BENIGN: ICD-10-CM

## 2017-09-14 DIAGNOSIS — J30.1 CHRONIC SEASONAL ALLERGIC RHINITIS DUE TO POLLEN: ICD-10-CM

## 2017-09-14 DIAGNOSIS — M51.369 DDD (DEGENERATIVE DISC DISEASE), LUMBAR: Chronic | ICD-10-CM

## 2017-09-14 DIAGNOSIS — F32.5 MAJOR DEPRESSION IN COMPLETE REMISSION (H): ICD-10-CM

## 2017-09-14 DIAGNOSIS — M17.0 PRIMARY OSTEOARTHRITIS OF BOTH KNEES: ICD-10-CM

## 2017-09-14 DIAGNOSIS — K21.9 GASTROESOPHAGEAL REFLUX DISEASE WITHOUT ESOPHAGITIS: Chronic | ICD-10-CM

## 2017-09-14 DIAGNOSIS — E66.3 OVERWEIGHT (BMI 25.0-29.9): Chronic | ICD-10-CM

## 2017-09-14 DIAGNOSIS — H04.123 TEAR FILM INSUFFICIENCY, BILATERAL: ICD-10-CM

## 2017-09-14 DIAGNOSIS — M81.0 AGE RELATED OSTEOPOROSIS, UNSPECIFIED PATHOLOGICAL FRACTURE PRESENCE: ICD-10-CM

## 2017-09-14 DIAGNOSIS — G44.219 EPISODIC TENSION-TYPE HEADACHE, NOT INTRACTABLE: ICD-10-CM

## 2017-09-14 DIAGNOSIS — R53.81 MALAISE AND FATIGUE: ICD-10-CM

## 2017-09-14 DIAGNOSIS — K59.01 SLOW TRANSIT CONSTIPATION: ICD-10-CM

## 2017-09-14 DIAGNOSIS — R29.6 TENDENCY TO FALL: ICD-10-CM

## 2017-09-14 DIAGNOSIS — G44.209 TENSION HEADACHE: Chronic | ICD-10-CM

## 2017-09-14 DIAGNOSIS — E55.9 VITAMIN D INSUFFICIENCY: ICD-10-CM

## 2017-09-14 DIAGNOSIS — R53.83 MALAISE AND FATIGUE: ICD-10-CM

## 2017-09-14 DIAGNOSIS — E55.9 VITAMIN D DEFICIENCY DISEASE: ICD-10-CM

## 2017-09-14 DIAGNOSIS — H90.5 SENSORY HEARING LOSS, UNILATERAL: ICD-10-CM

## 2017-09-14 DIAGNOSIS — R41.3 MEMORY LOSS: ICD-10-CM

## 2017-09-14 DIAGNOSIS — K64.4 EXTERNAL HEMORRHOIDS: ICD-10-CM

## 2017-09-14 DIAGNOSIS — R26.9 ABNORMALITY OF GAIT: ICD-10-CM

## 2017-09-14 DIAGNOSIS — E78.5 HYPERLIPIDEMIA LDL GOAL <160: Chronic | ICD-10-CM

## 2017-09-14 DIAGNOSIS — K04.7 DENTAL ABSCESS: Primary | ICD-10-CM

## 2017-09-14 DIAGNOSIS — Z91.81 RISK FOR FALLS: ICD-10-CM

## 2017-09-14 DIAGNOSIS — H90.3 SENSORINEURAL HEARING LOSS (SNHL) OF BOTH EARS: ICD-10-CM

## 2017-09-14 PROCEDURE — 99213 OFFICE O/P EST LOW 20 MIN: CPT | Performed by: FAMILY MEDICINE

## 2017-09-14 RX ORDER — AMOXICILLIN 500 MG/1
500 CAPSULE ORAL 3 TIMES DAILY
Qty: 30 CAPSULE | Refills: 0 | Status: SHIPPED | OUTPATIENT
Start: 2017-09-14 | End: 2017-09-14

## 2017-09-14 RX ORDER — CHOLECALCIFEROL (VITAMIN D3) 50 MCG
2 TABLET ORAL
Qty: 60 TABLET | Refills: 11 | Status: SHIPPED | OUTPATIENT
Start: 2017-09-14 | End: 2017-09-14

## 2017-09-14 RX ORDER — ACETAMINOPHEN 325 MG/1
325-650 TABLET ORAL EVERY 6 HOURS PRN
Qty: 180 TABLET | Refills: 11 | Status: SHIPPED | OUTPATIENT
Start: 2017-09-14 | End: 2018-04-03

## 2017-09-14 RX ORDER — ALUMINA, MAGNESIA, AND SIMETHICONE 2400; 2400; 240 MG/30ML; MG/30ML; MG/30ML
30 SUSPENSION ORAL 4 TIMES DAILY PRN
Qty: 1 BOTTLE | Refills: 11 | Status: SHIPPED | OUTPATIENT
Start: 2017-09-14 | End: 2018-04-03

## 2017-09-14 RX ORDER — METOPROLOL SUCCINATE 25 MG/1
12.5 TABLET, EXTENDED RELEASE ORAL AT BEDTIME
Qty: 45 TABLET | Refills: 3 | Status: SHIPPED | OUTPATIENT
Start: 2017-09-14 | End: 2017-10-03

## 2017-09-14 RX ORDER — CHOLECALCIFEROL (VITAMIN D3) 50 MCG
2 TABLET ORAL
Qty: 60 TABLET | Refills: 11 | Status: SHIPPED | OUTPATIENT
Start: 2017-09-14 | End: 2017-09-28

## 2017-09-14 RX ORDER — ATORVASTATIN CALCIUM 10 MG/1
10 TABLET, FILM COATED ORAL DAILY
Qty: 30 TABLET | Refills: 11 | Status: SHIPPED | OUTPATIENT
Start: 2017-09-14 | End: 2017-09-14

## 2017-09-14 RX ORDER — LIDOCAINE 50 MG/G
OINTMENT TOPICAL
Qty: 142 G | Refills: 11 | Status: SHIPPED | OUTPATIENT
Start: 2017-09-14 | End: 2018-04-03

## 2017-09-14 RX ORDER — ATORVASTATIN CALCIUM 10 MG/1
10 TABLET, FILM COATED ORAL DAILY
Qty: 30 TABLET | Refills: 11 | Status: SHIPPED | OUTPATIENT
Start: 2017-09-14 | End: 2018-04-03

## 2017-09-14 RX ORDER — AMOXICILLIN 500 MG/1
500 CAPSULE ORAL 3 TIMES DAILY
Qty: 30 CAPSULE | Refills: 0 | Status: SHIPPED | OUTPATIENT
Start: 2017-09-14 | End: 2018-02-16

## 2017-09-14 RX ORDER — LIDOCAINE 50 MG/G
OINTMENT TOPICAL
Qty: 142 G | Refills: 11 | Status: SHIPPED | OUTPATIENT
Start: 2017-09-14 | End: 2017-09-14

## 2017-09-14 RX ORDER — FLUTICASONE PROPIONATE 50 MCG
1 SPRAY, SUSPENSION (ML) NASAL DAILY
Qty: 16 G | Refills: 11 | Status: SHIPPED | OUTPATIENT
Start: 2017-09-14 | End: 2018-04-03

## 2017-09-14 RX ORDER — METOPROLOL SUCCINATE 25 MG/1
12.5 TABLET, EXTENDED RELEASE ORAL AT BEDTIME
Qty: 45 TABLET | Refills: 3 | Status: SHIPPED | OUTPATIENT
Start: 2017-09-14 | End: 2017-09-14

## 2017-09-14 RX ORDER — ACETAMINOPHEN 325 MG/1
325-650 TABLET ORAL EVERY 6 HOURS PRN
Qty: 180 TABLET | Refills: 11 | Status: SHIPPED | OUTPATIENT
Start: 2017-09-14 | End: 2017-09-14

## 2017-09-14 RX ORDER — POLYETHYLENE GLYCOL 3350 17 G/17G
1 POWDER, FOR SOLUTION ORAL DAILY
Qty: 510 G | Refills: 1 | Status: SHIPPED | OUTPATIENT
Start: 2017-09-14 | End: 2018-04-03

## 2017-09-14 RX ORDER — CHOLECALCIFEROL (VITAMIN D3) 50 MCG
2 TABLET ORAL
Qty: 60 TABLET | Refills: 11 | Status: SHIPPED | OUTPATIENT
Start: 2017-09-14 | End: 2018-04-03

## 2017-09-14 RX ORDER — NICOTINE POLACRILEX 4 MG/1
20 GUM, CHEWING ORAL DAILY
Qty: 30 TABLET | Refills: 11 | Status: SHIPPED | OUTPATIENT
Start: 2017-09-14 | End: 2018-04-03

## 2017-09-14 RX ORDER — NICOTINE POLACRILEX 4 MG/1
20 GUM, CHEWING ORAL DAILY
Qty: 30 TABLET | Refills: 11 | Status: SHIPPED | OUTPATIENT
Start: 2017-09-14 | End: 2017-09-14

## 2017-09-14 ASSESSMENT — PATIENT HEALTH QUESTIONNAIRE - PHQ9: SUM OF ALL RESPONSES TO PHQ QUESTIONS 1-9: 0

## 2017-09-14 NOTE — NURSING NOTE
"Chief Complaint   Patient presents with     Dental Problem     1 month       Initial /68  Pulse 59  Temp 98.7  F (37.1  C) (Tympanic)  Resp 16  Wt 159 lb 8 oz (72.3 kg)  SpO2 95%  BMI 26.96 kg/m2 Estimated body mass index is 26.96 kg/(m^2) as calculated from the following:    Height as of 1/12/17: 5' 4.5\" (1.638 m).    Weight as of this encounter: 159 lb 8 oz (72.3 kg).  Medication Reconciliation: complete   Janet Lunsford CMA    "

## 2017-09-14 NOTE — PROGRESS NOTES
SUBJECTIVE:   Rody Gonzalez is a 77 year old female who presents to clinic today for the following health issues:      TOOTH INFECTION  Right sided   Lower righth      Duration: 1 month    Description (location/character/radiation): lower right tooth    Intensity:  severe    Accompanying signs and symptoms: pain when eating, sensitive to hot and cold    History (similar episodes/previous evaluation): has been to dentist,     Precipitating or alleviating factors: None    Therapies tried and outcome: has been given 1 dose of antibiotics, lost the RX for the second dose    GASTROESOPHAGEAL REFLUX DISEASE WITHOUT ESOPHAGITIS     LUMBAR DISC DISEASE     ANXIETY     URINE FREQUENCY    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  Current Outpatient Prescriptions   Medication Sig Dispense Refill     order for DME One cane of any material   One  As directed  adjustible if necessary 1 each 0     Cholecalciferol (VITAMIN D3) 2000 UNITS TABS Take 2 tablets by mouth daily (with breakfast) 60 tablet 11     Cholecalciferol (VITAMIN D3) 2000 UNITS TABS Take 2 tablets by mouth daily (with breakfast) 60 tablet 11     amoxicillin (AMOXIL) 500 MG capsule Take 1 capsule (500 mg) by mouth 3 times daily 30 capsule 0     omeprazole (KLS OMPERAZOLE) 20 MG tablet Take 1 tablet (20 mg) by mouth daily 30 tablet 11     acetaminophen (MAPAP) 325 MG tablet Take 1-2 tablets (325-650 mg) by mouth every 6 hours as needed for mild pain 180 tablet 11     metoprolol (TOPROL-XL) 25 MG 24 hr tablet Take 0.5 tablets (12.5 mg) by mouth At Bedtime 45 tablet 3     aspirin-acetaminophen-caffeine (EXCEDRIN MIGRAINE) 250-250-65 MG per tablet Take 1 tablet by mouth  every 6 hours as needed for headaches 80 tablet 11     lidocaine (XYLOCAINE) 5 % ointment One application 4 x daily to affected areas lower back and knees if necessary  Profile Rx: patient will contact pharmacy when needed 142 g 11     atorvastatin (LIPITOR) 10 MG tablet Take 1 tablet (10 mg) by mouth daily 30 tablet 11     fluticasone (FLONASE) 50 MCG/ACT spray Spray 1 spray into both nostrils daily 16 g 11     hydrocortisone (ANUSOL-HC) 2.5 % cream Place rectally 2 times daily 30 g 11     polyethylene glycol (MIRALAX) powder Take 17 g (1 capful) by mouth daily 510 g 1     alum & mag hydroxide-simethicone (MYLANTA ES/MAALOX  ES) 400-400-40 MG/5ML SUSP suspension Take 30 mLs by mouth 4 times daily as needed for indigestion 1 Bottle 11     [DISCONTINUED] atorvastatin (LIPITOR) 10 MG tablet Take 1 tablet (10 mg) by mouth daily 30 tablet 11     [DISCONTINUED] metoprolol (TOPROL-XL) 25 MG 24 hr tablet Take 0.5 tablets (12.5 mg) by mouth At Bedtime 45 tablet 3     order for DME Cane of any materials of adujustable priya  *one   Use as directed* (Patient not taking: Reported on 7/18/2017) 1 each 0     [DISCONTINUED] atorvastatin (LIPITOR) 10 MG tablet Take 1 tablet (10 mg) by mouth daily 30 tablet 11     [DISCONTINUED] metoprolol (TOPROL-XL) 25 MG 24 hr tablet Take 0.5 tablets (12.5 mg) by mouth At Bedtime (Patient not taking: Reported on 7/18/2017) 45 tablet 3     hydrocortisone (CORTAID) 1 % cream Apply sparingly to affected area three times daily for 14 days. (Patient not taking: Reported on 7/18/2017) 30 g 5     loratadine (QC LORATADINE ALLERGY RELIEF) 10 MG tablet Take 1 tablet (10 mg) by mouth daily as needed (Patient not taking: Reported on 7/18/2017) 30 tablet 11     aspirin 81 MG chewable tablet Take 1 tablet (81 mg) by mouth daily (Patient not taking: Reported on 7/18/2017) 108 tablet 3     glucosamine-chondroitinoitin ([facility] GLUCOSAMINE-CHONDROITIN) 500-400 MG TABS Take 2 tablets by mouth 2 times daily (Patient  not taking: Reported on 2017) 120 tablet 11        No Known Allergies    Immunization History   Administered Date(s) Administered     Pneumococcal 23 valent 2006     Tdap (Adacel,Boostrix) 2006         reports that she does not drink alcohol.      reports that she does not use illicit drugs.    family history includes Family History Negative in her father and mother.    indicated that her mother is . She indicated that her father is .      has a past surgical history that includes Hysterectomy ().     reports that she does not currently engage in sexual activity.  .  Pediatric History   Patient Guardian Status     Not on file.     Other Topics Concern     Parent/Sibling W/ Cabg, Mi Or Angioplasty Before 65f 55m? No     Social History Narrative         reports that she has never smoked. She has never used smokeless tobacco.    Medical, social, surgical, and family histories reviewed.    Labs reviewed in EPIC  Patient Active Problem List   Diagnosis     Health Care Home     Gastroesophageal reflux disease without esophagitis     DDD (degenerative disc disease), lumbar     Dysuria     Anxiety state     Urinary frequency     Tuberculosis of peripheral lymph nodes     Nonspecific abnormal results of thyroid function study     Pulmonary tuberculosis     PPD positive     Papanicolaou smear of cervix with atypical squamous cells of undetermined significance (ASC-US)     Osteoporosis     Tear film insufficiency     Memory loss     Headache     Abdominal pain     Closed fracture of triquetral (cuneiform) bone of wrist     Other chronic pain     Abnormality of gait     Hearing loss     Malaise and fatigue     Acute cystitis     Major depression in complete remission (H)     Sensory hearing loss, unilateral     Vitamin D deficiency disease     Hyperlipidemia LDL goal <160     Overweight (BMI 25.0-29.9)     Risk for falls     ACP (advance care planning)     Primary osteoarthritis of both  knees     Tension headache     History of bloody stools     Past Surgical History:   Procedure Laterality Date     HYSTERECTOMY  2004       Social History   Substance Use Topics     Smoking status: Never Smoker     Smokeless tobacco: Never Used     Alcohol use No     Family History   Problem Relation Age of Onset     Family History Negative Mother      Family History Negative Father          No Known Allergies  Recent Labs   Lab Test  07/18/17   1605  01/12/17   1510  04/13/16   0943  12/04/15   1530  04/10/15   1257  03/04/14   1050  09/13/13   1125   LDL   --    --   126*  125*  116  134*  120   HDL   --    --   41*  53  42*  42*  41*   TRIG   --    --   131  100  77  84  99   ALT  25   --    --   30  44  22   --    CR  0.67  0.63  0.70   --   0.80  0.70  0.70   GFRESTIMATED  85  >90  Non  GFR Calc    81   --   70  82  82   GFRESTBLACK  >90   GFR Calc    >90   GFR Calc    >90   --   85  >90  >90   POTASSIUM  4.0  4.2  4.0   --   4.7  4.5  4.4   TSH   --    --    --    --    --   1.47  1.86        BP Readings from Last 6 Encounters:   09/14/17 128/68   07/18/17 128/64   06/15/17 118/64   06/01/17 128/64   03/31/17 144/80   02/07/17 124/64       Wt Readings from Last 3 Encounters:   09/14/17 159 lb 8 oz (72.3 kg)   07/18/17 158 lb (71.7 kg)   06/15/17 158 lb 8 oz (71.9 kg)         Positive symptoms or findings indicated by bold designation:     ROS: 10 point ROS neg other than the symptoms noted above in the HPI.except  has Health Care Home; Gastroesophageal reflux disease without esophagitis; DDD (degenerative disc disease), lumbar; Dysuria; Anxiety state; Urinary frequency; Tuberculosis of peripheral lymph nodes; Nonspecific abnormal results of thyroid function study; Pulmonary tuberculosis; PPD positive; Papanicolaou smear of cervix with atypical squamous cells of undetermined significance (ASC-US); Osteoporosis; Tear film insufficiency; Memory loss; Headache;  Abdominal pain; Closed fracture of triquetral (cuneiform) bone of wrist; Other chronic pain; Abnormality of gait; Hearing loss; Malaise and fatigue; Acute cystitis; Major depression in complete remission (H); Sensory hearing loss, unilateral; Vitamin D deficiency disease; Hyperlipidemia LDL goal <160; Overweight (BMI 25.0-29.9); Risk for falls; ACP (advance care planning); Primary osteoarthritis of both knees; Tension headache; and History of bloody stools on her problem list.   Constitutional: The patient denied fatigue, fever, insomnia, night sweats, recent illness and weight loss.  OVER WEIGHT     Eyes: The patient denied blindness, eye pain, eye tearing, photophobia, vision change and visual disturbance.       Ears/Nose/Throat/Neck: The patient denied dizziness, facial pain, hearing loss, nasal discharge, oral pain, otalgia, postnasal drip, sinus congestion, sore throat, tinnitus and voice change.   NORMAL     Cardiovascular: The patient denied arrhythmia, chest pain/pressure, claudication, edema, exercise intolerance, fatigue, orthopnea, palpitations and syncope.  NORMAL     Respiratory: The patient denied asthma, chest congestion, cough, dyspnea on exertion, dyspnea/shortness of breath, hemoptysis, pedal edema, pleuritic pain, productive sputum, snoring and wheezing.NL     Gastrointestinal: The patient denied abdominal pain, anorexia, constipation, diarrhea, dysphagia, gastroesophageal reflux, hematochezia, hemorrhoids, melena, nausea and vomiting . NORMAL     Genitourinary/Nephrology: The patient denied breast complaint, dysuria, nocturia sexual dysfunction, t, urinary frequency, urinary incontinence, urinary urgency    NORMAL     Musculoskeletal: The patient denied arthralgia(s), back pain, joint complaint, muscle weakness, myalgias, osteoporosis, sciatica, stiffness and swelling.  NORMAL     Dermatoligic:: The patient denied acne, dermatitis, ecchymosis, itching, mole change, rash, skin cancer, skin lesion  and sores.  NORMAL     Neurologic: The patient denied dizziness, gait abnormality, headache, memory loss, mental status change, paresis, paresthesia, seizure, syncope, tremor and vision change. NORMAL     Psychiatric: The patient denied anxiety, depression, disturbances of memory, drug abuse, insomnia, mood swings and relationship difficulties.  NORMAL     Endocrine: The patient denied , goiter, obesity, polyuria and thyroid disease. NORMAL     Hematologic/Lymphatic: The patient denied abnormal bleeding and bruising, abnormal ecchymoses, anemia, lymph node enlargement/mass, petechiae and venous  Thrombosis. NORMAL     Allergy/Immunology: The patient denied food allergy and  Allergic rhinitis or conjunctivitis.  NORMAL       PE:  /68  Pulse 59  Temp 98.7  F (37.1  C) (Tympanic)  Resp 16  Wt 159 lb 8 oz (72.3 kg)  SpO2 95%  BMI 26.96 kg/m2 Body mass index is 26.96 kg/(m^2).    Constitutional: general appearance, well nourished, well developed, in no acute distress, well developed, appears stated age, normal body habitus,  NORMAL     Eyes:; The patient has normal eyelids sclerae and conjunctivae :NL      Ears/Nose/Throat: external ear, overall: normal appearance; external nose, overall: benign appearance, normal moujth gums and lips  The patient has: NORMAL     Neck: thyroid, overall: normal size, normal consistency, nontender,  NORMAL     Respiratory:  palpation of chest, overall: normal excursion, NORMAL   Clear to percussion and auscultation  NORMAL     Tachypnea  NORMAL  Color  NORMAL     Cardiovascular:  Good color with no peripheral edema  NORMAL   Regular sinus rhythm without murmur. Physiologic heart sounds Heart is unelarged  .   Chest/Breast: normal shape  NORMAL      Abdominal exam,  Liver and spleen are  unenlarged  NORMAL       Tenderness  NORMAL   Scars NOT APPLICABLE     Urogenital; no renal, flank or bladder  tenderness;  NORMAL     Lymphatic: neck nodes, NORMAL    Other nodes NORMAL      Musculoskeletal:  Brief ortho exam normal except:   OSTEOARTHRITIS KNEES     Integument: inspection of skin, no rash, lesions; and, palpation, no induration, no tenderness.      Neurologic mental status, overall: alert and oriented; gait, no ataxia, no unsteadiness; coordination, no tremors; cranial nerves, overall: normal motor, overall: normal bulk, tone.  NORMAL     Psychiatric: orientation/consciousness, overall: oriented to person, place and time; behavior/psychomotor activity, no tics, normal psychomotor activity; mood and affect, overall: normal mood and affect; appearance, overall: well-groomed, good eye contact; speech, overall: normal quality, no aphasia and normal quality, quantity, intact.  NORMAL     Diagnostic Test Results:  Results for orders placed or performed in visit on 07/18/17   Comprehensive metabolic panel   Result Value Ref Range    Sodium 141 133 - 144 mmol/L    Potassium 4.0 3.4 - 5.3 mmol/L    Chloride 110 (H) 94 - 109 mmol/L    Carbon Dioxide 24 20 - 32 mmol/L    Anion Gap 7 3 - 14 mmol/L    Glucose 76 70 - 99 mg/dL    Urea Nitrogen 12 7 - 30 mg/dL    Creatinine 0.67 0.52 - 1.04 mg/dL    GFR Estimate 85 >60 mL/min/1.7m2    GFR Estimate If Black >90   GFR Calc   >60 mL/min/1.7m2    Calcium 10.5 (H) 8.5 - 10.1 mg/dL    Bilirubin Total 0.3 0.2 - 1.3 mg/dL    Albumin 3.8 3.4 - 5.0 g/dL    Protein Total 7.6 6.8 - 8.8 g/dL    Alkaline Phosphatase 78 40 - 150 U/L    ALT 25 0 - 50 U/L    AST 28 0 - 45 U/L   Vitamin D Deficiency   Result Value Ref Range    Vitamin D Deficiency screening 31 20 - 75 ug/L   CBC with platelets   Result Value Ref Range    WBC 5.4 4.0 - 11.0 10e9/L    RBC Count 4.24 3.8 - 5.2 10e12/L    Hemoglobin 12.7 11.7 - 15.7 g/dL    Hematocrit 38.2 35.0 - 47.0 %    MCV 90 78 - 100 fl    MCH 30.0 26.5 - 33.0 pg    MCHC 33.2 31.5 - 36.5 g/dL    RDW 13.3 10.0 - 15.0 %    Platelet Count 175 150 - 450 10e9/L   Erythrocyte sedimentation rate auto   Result Value Ref  Range    Sed Rate 18 0 - 30 mm/h         ICD-10-CM    1. Dental abscess K04.7 DENTAL REFERRAL     amoxicillin (AMOXIL) 500 MG capsule     DISCONTINUED: amoxicillin (AMOXIL) 500 MG capsule     DISCONTINUED: amoxicillin (AMOXIL) 500 MG capsule   2. Gastroesophageal reflux disease without esophagitis K21.9 omeprazole (KLS OMPERAZOLE) 20 MG tablet     alum & mag hydroxide-simethicone (MYLANTA ES/MAALOX  ES) 400-400-40 MG/5ML SUSP suspension     DISCONTINUED: omeprazole (KLS OMPERAZOLE) 20 MG tablet   3. DDD (degenerative disc disease), lumbar M51.36    4. Tear film insufficiency, bilateral H04.123    5. Memory loss R41.3    6. Chronic tension-type headache, not intractable G44.229    7. Abnormality of gait R26.9    8. Sensorineural hearing loss (SNHL) of both ears H90.3    9. Malaise and fatigue R53.81     R53.83    10. Major depression in complete remission (H) F32.5    11. Sensory hearing loss, unilateral H90.5    12. Vitamin D deficiency disease E55.9    13. Hyperlipidemia LDL goal <160 E78.5 atorvastatin (LIPITOR) 10 MG tablet     DISCONTINUED: atorvastatin (LIPITOR) 10 MG tablet   14. Overweight (BMI 25.0-29.9) E66.3    15. Risk for falls Z91.81    16. Tension headache G44.209 aspirin-acetaminophen-caffeine (EXCEDRIN MIGRAINE) 250-250-65 MG per tablet     DISCONTINUED: aspirin-acetaminophen-caffeine (EXCEDRIN MIGRAINE) 250-250-65 MG per tablet   17. Age related osteoporosis, unspecified pathological fracture presence M81.0 lidocaine (XYLOCAINE) 5 % ointment     DISCONTINUED: lidocaine (XYLOCAINE) 5 % ointment   18. Episodic tension-type headache, not intractable G44.219 metoprolol (TOPROL-XL) 25 MG 24 hr tablet     DISCONTINUED: metoprolol (TOPROL-XL) 25 MG 24 hr tablet   19. Essential hypertension, benign I10 metoprolol (TOPROL-XL) 25 MG 24 hr tablet     DISCONTINUED: metoprolol (TOPROL-XL) 25 MG 24 hr tablet   20. Primary osteoarthritis of both knees M17.0 order for DME   21. Tendency to fall R29.6 order for DME    22. Vitamin D insufficiency E55.9 Cholecalciferol (VITAMIN D3) 2000 UNITS TABS     Cholecalciferol (VITAMIN D3) 2000 UNITS TABS     acetaminophen (MAPAP) 325 MG tablet     DISCONTINUED: Cholecalciferol (VITAMIN D3) 2000 UNITS TABS     DISCONTINUED: acetaminophen (MAPAP) 325 MG tablet     DISCONTINUED: Cholecalciferol (VITAMIN D3) 2000 UNITS TABS   23. Chronic seasonal allergic rhinitis due to pollen J30.1 fluticasone (FLONASE) 50 MCG/ACT spray   24. External hemorrhoids K64.4 hydrocortisone (ANUSOL-HC) 2.5 % cream   25. Slow transit constipation K59.01 polyethylene glycol (MIRALAX) powder        .    Side effects benefits and risks thoroughly discussed. .she may come in early if unimproved or getting worse          Importance of adhering to regimen discussed and if medications were dispensed, the importance of taking medications discussed and bringing in the medications after every visit for chronic problems         Please drink 2 glasses of water prior to meals and walk 15-30 minutes after meals    I spent 25 MINUTES SPENT  with patient discussing the following issues   The primary encounter diagnosis was Dental abscess. Diagnoses of Gastroesophageal reflux disease without esophagitis, DDD (degenerative disc disease), lumbar, Tear film insufficiency, bilateral, Memory loss, Chronic tension-type headache, not intractable, Abnormality of gait, Sensorineural hearing loss (SNHL) of both ears, Malaise and fatigue, Major depression in complete remission (H), Sensory hearing loss, unilateral, Vitamin D deficiency disease, Hyperlipidemia LDL goal <160, Overweight (BMI 25.0-29.9), Risk for falls, Tension headache, Age related osteoporosis, unspecified pathological fracture presence, Episodic tension-type headache, not intractable, Essential hypertension, benign, Primary osteoarthritis of both knees, Tendency to fall, Vitamin D insufficiency, Chronic seasonal allergic rhinitis due to pollen, External hemorrhoids, and Slow  transit constipation were also pertinent to this visit. over half of which involved counseling and coordination of care.    Patient Instructions   (K04.7) Dental abscess  (primary encounter diagnosis)  Comment:    Plan: DENTAL REFERRAL, amoxicillin (AMOXIL) 500 MG         capsule, DISCONTINUED: amoxicillin (AMOXIL) 500        MG capsule             (K21.9) Gastroesophageal reflux disease without esophagitis  Comment:    Plan: omeprazole (KLS OMPERAZOLE) 20 MG tablet             (M51.36) DDD (degenerative disc disease), lumbar  Comment:    Plan:      (H04.123) Tear film insufficiency, bilateral  Comment:    Plan:      (R41.3) Memory loss  Comment:    Plan:      (G44.229) Chronic tension-type headache, not intractable  Comment:    Plan:      (R26.9) Abnormality of gait  Comment:    Plan:      (H90.3) Sensorineural hearing loss (SNHL) of both ears  Comment:    Plan:      (R53.81,  R53.83) Malaise and fatigue  Comment:    Plan:      (F32.5) Major depression in complete remission (H)  Comment:    Plan:      (H90.5) Sensory hearing loss, unilateral  Comment:    Plan:      (E55.9) Vitamin D deficiency disease  Comment:    Plan:      (E78.5) Hyperlipidemia LDL goal <160  Comment:    Plan: atorvastatin (LIPITOR) 10 MG tablet             (E66.3) Overweight (BMI 25.0-29.9)  Comment:    Plan:      (Z91.81) Risk for falls  Comment:    Plan:      (G44.209) Tension headache  Comment:    Plan: aspirin-acetaminophen-caffeine (EXCEDRIN         MIGRAINE) 250-250-65 MG per tablet             (M81.0) Age related osteoporosis, unspecified pathological fracture presence  Comment:    Plan: lidocaine (XYLOCAINE) 5 % ointment             (G44.219) Episodic tension-type headache, not intractable  Comment:    Plan: metoprolol (TOPROL-XL) 25 MG 24 hr tablet             (I10) Essential hypertension, benign  Comment:    Plan: metoprolol (TOPROL-XL) 25 MG 24 hr tablet             (M17.0) Primary osteoarthritis of both knees  Comment:    Plan:  order for DME             (R29.6) Tendency to fall  Comment:    Plan: order for DME             (E55.9) Vitamin D insufficiency  Comment:    Plan: Cholecalciferol (VITAMIN D3) 2000 UNITS TABS,         acetaminophen (MAPAP) 325 MG tablet                       ALL THE ABOVE PROBLEMS ARE STABLE AND MED CHANGES AS NOTED    Diet: MEDITERRANEAN DIET     Exercise:  AEROBIC AND KNEE PAIN    Exercises Range of motion, balance, isometric, and strengthening exercises 30 repetitions twice daily of involved joints      .GAYLA VEGA MD 9/14/2017 3:24 PM  September 14, 2017

## 2017-09-14 NOTE — MR AVS SNAPSHOT
After Visit Summary   9/14/2017    Rody Gonzalez    MRN: [id number]           Patient Information     Date Of Birth          1940        Visit Information        Provider Department      9/14/2017 3:15 PM Osman Yen MD; [employer] [facility]        Today's Diagnoses     Dental abscess    -  1    Gastroesophageal reflux disease without esophagitis        DDD (degenerative disc disease), lumbar        Tear film insufficiency, bilateral        Memory loss        Chronic tension-type headache, not intractable        Abnormality of gait        Sensorineural hearing loss (SNHL) of both ears        Malaise and fatigue        Major depression in complete remission (H)        Sensory hearing loss, unilateral        Vitamin D deficiency disease        Hyperlipidemia LDL goal <160        Overweight (BMI 25.0-29.9)        Risk for falls        Tension headache        Age related osteoporosis, unspecified pathological fracture presence        Episodic tension-type headache, not intractable        Essential hypertension, benign        Primary osteoarthritis of both knees        Tendency to fall        Vitamin D insufficiency        Chronic seasonal allergic rhinitis due to pollen        External hemorrhoids        Slow transit constipation          Care Instructions    (K04.7) Dental abscess  (primary encounter diagnosis)  Comment:    Plan: DENTAL REFERRAL, amoxicillin (AMOXIL) 500 MG         capsule, DISCONTINUED: amoxicillin (AMOXIL) 500        MG capsule             (K21.9) Gastroesophageal reflux disease without esophagitis  Comment:    Plan: omeprazole (KLS OMPERAZOLE) 20 MG tablet             (M51.36) DDD (degenerative disc disease), lumbar  Comment:    Plan:      (H04.123) Tear film insufficiency, bilateral  Comment:    Plan:      (R41.3) Memory loss  Comment:    Plan:      (G44.229) Chronic tension-type headache, not  intractable  Comment:    Plan:      (R26.9) Abnormality of gait  Comment:    Plan:      (H90.3) Sensorineural hearing loss (SNHL) of both ears  Comment:    Plan:      (R53.81,  R53.83) Malaise and fatigue  Comment:    Plan:      (F32.5) Major depression in complete remission (H)  Comment:    Plan:      (H90.5) Sensory hearing loss, unilateral  Comment:    Plan:      (E55.9) Vitamin D deficiency disease  Comment:    Plan:      (E78.5) Hyperlipidemia LDL goal <160  Comment:    Plan: atorvastatin (LIPITOR) 10 MG tablet             (E66.3) Overweight (BMI 25.0-29.9)  Comment:    Plan:      (Z91.81) Risk for falls  Comment:    Plan:      (G44.209) Tension headache  Comment:    Plan: aspirin-acetaminophen-caffeine (EXCEDRIN         MIGRAINE) 250-250-65 MG per tablet             (M81.0) Age related osteoporosis, unspecified pathological fracture presence  Comment:    Plan: lidocaine (XYLOCAINE) 5 % ointment             (G44.219) Episodic tension-type headache, not intractable  Comment:    Plan: metoprolol (TOPROL-XL) 25 MG 24 hr tablet             (I10) Essential hypertension, benign  Comment:    Plan: metoprolol (TOPROL-XL) 25 MG 24 hr tablet             (M17.0) Primary osteoarthritis of both knees  Comment:    Plan: order for DME             (R29.6) Tendency to fall  Comment:    Plan: order for DME             (E55.9) Vitamin D insufficiency  Comment:    Plan: Cholecalciferol (VITAMIN D3) 2000 UNITS TABS,         acetaminophen (MAPAP) 325 MG tablet                         Follow-ups after your visit        Additional Services     DENTAL REFERRAL       Your provider has referred you to:   [facility]   Member Services    [phone] (toll-free)       Type:  DENTAL ABSCESS RIGHT POSTERIOR MOLAR  Urgency: Urgent  Area: right lower      Please be aware that coverage of these services is subject to the terms and limitations of your health insurance plan.  Call member services at your health plan with any benefit  "or coverage questions.      Please bring the following with you to your appointment:    (1) Any X-Rays, CTs or MRIs which have been performed.  Contact the facility where they were done to arrange for  prior to your scheduled appointment.  Any new CT, MRI or other procedures ordered by your specialist must be performed at a [facility] facility or coordinated by your clinic's referral office.    (2) List of current medications   (3) This referral request   (4) Any documents/labs given to you for this referral                  Who to contact     If you have questions or need follow up information about today's clinic visit or your schedule please contact [facility] directly at [phone].  Normal or non-critical lab and imaging results will be communicated to you by MyChart, letter or phone within 4 business days after the clinic has received the results. If you do not hear from us within 7 days, please contact the clinic through MyChart or phone. If you have a critical or abnormal lab result, we will notify you by phone as soon as possible.  Submit refill requests through [employer] or call your pharmacy and they will forward the refill request to us. Please allow 3 business days for your refill to be completed.          Additional Information About Your Visit        [employer] Information     [employer] lets you send messages to your doctor, view your test results, renew your prescriptions, schedule appointments and more. To sign up, go to [email or web address] . Click on \"Log in\" on the left side of the screen, which will take you to the Welcome page. Then click on \"Sign up Now\" on the right side of the page.     You will be asked to enter the access code listed below, as well as some personal information. Please follow the directions to create your username and password.     Your access code is: [email or web address]  Expires: 2017  3:49 PM     Your access code will  in 90 " days. If you need help or a new code, please call your [facility] clinic or [phone].        Care EveryWhere ID     This is your Care EveryWhere ID. This could be used by other organizations to access your [facility] medical records  [id number]        Your Vitals Were     Pulse Temperature Respirations Pulse Oximetry BMI (Body Mass Index)       59 98.7  F (37.1  C) (Tympanic) 16 95% 26.96 kg/m2        Blood Pressure from Last 3 Encounters:   09/14/17 128/68   07/18/17 128/64   06/15/17 118/64    Weight from Last 3 Encounters:   09/14/17 159 lb 8 oz (72.3 kg)   07/18/17 158 lb (71.7 kg)   06/15/17 158 lb 8 oz (71.9 kg)              We Performed the Following     DENTAL REFERRAL     DEPRESSION ACTION PLAN (DAP)          Today's Medication Changes          These changes are accurate as of: 9/14/17  3:49 PM.  If you have any questions, ask your nurse or doctor.               Start taking these medicines.        Dose/Directions    acetaminophen 325 MG tablet   Commonly known as:  MAPAP   Used for:  Vitamin D insufficiency   Started by:  Osman Yen MD        Dose:  325-650 mg   Take 1-2 tablets (325-650 mg) by mouth every 6 hours as needed for mild pain   Quantity:  180 tablet   Refills:  11       amoxicillin 500 MG capsule   Commonly known as:  AMOXIL   Used for:  Dental abscess   Started by:  Osman Yen MD        Dose:  500 mg   Take 1 capsule (500 mg) by mouth 3 times daily   Quantity:  30 capsule   Refills:  0       aspirin-acetaminophen-caffeine 250-250-65 MG per tablet   Commonly known as:  EXCEDRIN MIGRAINE   Used for:  Tension headache   Started by:  Osman Yen MD        Dose:  1 tablet   Take 1 tablet by mouth every 6 hours as needed for headaches   Quantity:  80 tablet   Refills:  11       atorvastatin 10 MG tablet   Commonly known as:  LIPITOR   Used for:  Hyperlipidemia LDL goal <160   Started by:  Osman Yen MD        Dose:  10 mg   Take 1 tablet (10  mg) by mouth daily   Quantity:  30 tablet   Refills:  11       lidocaine 5 % ointment   Commonly known as:  XYLOCAINE   Used for:  Age related osteoporosis, unspecified pathological fracture presence   Started by:  Osman Yen MD        One application 4 x daily to affected areas lower back and knees if necessary Profile Rx: patient will contact pharmacy when needed   Quantity:  142 g   Refills:  11       metoprolol 25 MG 24 hr tablet   Commonly known as:  TOPROL-XL   Used for:  Episodic tension-type headache, not intractable, Essential hypertension, benign   Started by:  Osman Yen MD        Dose:  12.5 mg   Take 0.5 tablets (12.5 mg) by mouth At Bedtime   Quantity:  45 tablet   Refills:  3       omeprazole 20 MG tablet   Commonly known as:  KLS OMPERAZOLE   Used for:  Gastroesophageal reflux disease without esophagitis   Started by:  Osman Yen MD        Dose:  20 mg   Take 1 tablet (20 mg) by mouth daily   Quantity:  30 tablet   Refills:  11       * Vitamin D3 2000 UNITS Tabs   Used for:  Vitamin D insufficiency   Started by:  Osman Yen MD        Dose:  2 tablet   Take 2 tablets by mouth daily (with breakfast)   Quantity:  60 tablet   Refills:  11       * Vitamin D3 2000 UNITS Tabs   Used for:  Vitamin D insufficiency   Started by:  Osman Yen MD        Dose:  2 tablet   Take 2 tablets by mouth daily (with breakfast)   Quantity:  60 tablet   Refills:  11       * Notice:  This list has 2 medication(s) that are the same as other medications prescribed for you. Read the directions carefully, and ask your doctor or other care provider to review them with you.         Where to get your medicines      These medications were sent to [id number] - [place], [place] - [address] [place] [address] AT [address] [place] [address], [address]  [address] [place] [id number]     Phone:  [phone]     acetaminophen 325 MG tablet    alum  & mag hydroxide-simethicone 400-400-40 MG/5ML Susp suspension    amoxicillin 500 MG capsule    aspirin-acetaminophen-caffeine 250-250-65 MG per tablet    atorvastatin 10 MG tablet    fluticasone 50 MCG/ACT spray    hydrocortisone 2.5 % cream    metoprolol 25 MG 24 hr tablet    omeprazole 20 MG tablet    polyethylene glycol powder    Vitamin D3 2000 UNITS Tabs    Vitamin D3 2000 UNITS Tabs         Some of these will need a paper prescription and others can be bought over the counter.  Ask your nurse if you have questions.     Bring a paper prescription for each of these medications     lidocaine 5 % ointment    order for DME                Primary Care Provider Office Phone # Fax #    Osman Myranda Yen [phone] [address] [facility] [address]        Equal Access to Services     JAYANT SEPULVEDA : Jackie vásquez Sochris, waaxda luqadaha, qaybta kaalmada adeegyaradha, everton haro . So [facility] [phone].    ATENCIÓN: Si habla español, tiene a hdez disposición servicios gratuitos de asistencia lingüística. Ll[facility] [phone].    We comply with applicable federal civil rights laws and [place] laws. We do not discriminate on the basis of race, color, national origin, age, disability sex, sexual orientation or gender identity.            Thank you!     Thank you for choosing [facility]  for your care. Our goal is always to provide you with excellent care. Hearing back from our patients is one way we can continue to improve our services. Please take a few minutes to complete the written survey that you may receive in the mail after your visit with us. Thank you!             Your Updated Medication List - Protect others around you: Learn how to safely use, store and throw away your medicines at www.disposemymeds.org.          This list is accurate as of: 9/14/17  3:49 PM.  Always use your most recent med list.                    Brand Name Dispense Instructions for use Diagnosis    acetaminophen 325 MG tablet    MAPAP    180 tablet    Take 1-2 tablets (325-650 mg) by mouth every 6 hours as needed for mild pain    Vitamin D insufficiency       alum & mag hydroxide-simethicone 400-400-40 MG/5ML Susp suspension    MYLANTA ES/MAALOX  ES    1 Bottle    Take 30 mLs by mouth 4 times daily as needed for indigestion    Gastroesophageal reflux disease without esophagitis       amoxicillin 500 MG capsule    AMOXIL    30 capsule    Take 1 capsule (500 mg) by mouth 3 times daily    Dental abscess       aspirin 81 MG chewable tablet     108 tablet    Take 1 tablet (81 mg) by mouth daily    Hyperlipidemia LDL goal <160       aspirin-acetaminophen-caffeine 250-250-65 MG per tablet    EXCEDRIN MIGRAINE    80 tablet    Take 1 tablet by mouth every 6 hours as needed for headaches    Tension headache       atorvastatin 10 MG tablet    LIPITOR    30 tablet    Take 1 tablet (10 mg) by mouth daily    Hyperlipidemia LDL goal <160       fluticasone 50 MCG/ACT spray    FLONASE    16 g    Spray 1 spray into both nostrils daily    Chronic seasonal allergic rhinitis due to pollen       glucosamine-chondroitinoitin 500-400 MG Tabs    [facility] GLUCOSAMINE-CHONDROITIN    120 tablet    Take 2 tablets by mouth 2 times daily    Primary osteoarthritis of both knees       hydrocortisone 1 % cream    CORTAID    30 g    Apply sparingly to affected area three times daily for 14 days.    External hemorrhoids       hydrocortisone 2.5 % cream    ANUSOL-HC    30 g    Place rectally 2 times daily    External hemorrhoids       lidocaine 5 % ointment    XYLOCAINE    142 g    One application 4 x daily to affected areas lower back and knees if necessary Profile Rx: patient will contact pharmacy when needed    Age related osteoporosis, unspecified pathological fracture presence       loratadine 10 MG tablet    QC LORATADINE ALLERGY RELIEF    30 tablet    Take 1 tablet (10 mg) by mouth  daily as needed    Seasonal allergic rhinitis       metoprolol 25 MG 24 hr tablet    TOPROL-XL    45 tablet    Take 0.5 tablets (12.5 mg) by mouth At Bedtime    Episodic tension-type headache, not intractable, Essential hypertension, benign       omeprazole 20 MG tablet    KLS OMPERAZOLE    30 tablet    Take 1 tablet (20 mg) by mouth daily    Gastroesophageal reflux disease without esophagitis       * order for DME     1 each    Cane of any materials of adujustable priya *one  Use as directed*    Tendency to fall       * order for DME     1 each    One cane of any material  One As directed adjustible if necessary    Primary osteoarthritis of both knees, Tendency to fall       polyethylene glycol powder    MIRALAX    510 g    Take 17 g (1 capful) by mouth daily    Slow transit constipation       * Vitamin D3 2000 UNITS Tabs     60 tablet    Take 2 tablets by mouth daily (with breakfast)    Vitamin D insufficiency       * Vitamin D3 2000 UNITS Tabs     60 tablet    Take 2 tablets by mouth daily (with breakfast)    Vitamin D insufficiency       * Notice:  This list has 4 medication(s) that are the same as other medications prescribed for you. Read the directions carefully, and ask your doctor or other care provider to review them with you.

## 2017-09-14 NOTE — LETTER
My Depression Action Plan  Name: Rody Gonzalez   Date of Birth 1940  Date: 9/14/2017    My doctor: Osman Yen   My clinic: [address]  [facility] [place] [id number] [phone]          GREEN    ZONE   Good Control    What it looks like:     Things are going generally well. You have normal up s and down s. You may even feel depressed from time to time, but bad moods usually last less than a day.   What you need to do:  1. Continue to care for yourself (see self care plan)  2. Check your depression survival kit and update it as needed  3. Follow your physician s recommendations including any medication.  4. Do not stop taking medication unless you consult with your physician first.           YELLOW         ZONE Getting Worse    What it looks like:     Depression is starting to interfere with your life.     It may be hard to get out of bed; you may be starting to isolate yourself from others.    Symptoms of depression are starting to last most all day and this has happened for several days.     You may have suicidal thoughts but they are not constant.   What you need to do:     1. Call your care team, your response to treatment will improve if you keep your care team informed of your progress. Yellow periods are signs an adjustment may need to be made.     2. Continue your self-care, even if you have to fake it!    3. Talk to someone in your support network    4. Open up your depression survival kit           RED    ZONE Medical Alert - Get Help    What it looks like:     Depression is seriously interfering with your life.     You may experience these or other symptoms: You can t get out of bed most days, can t work or engage in other necessary activities, you have trouble taking care of basic hygiene, or basic responsibilities, thoughts of suicide or death that will not go away, self-injurious behavior.     What you need to  do:  1. Call your care team and request a same-day appointment. If they are not available (weekends or after hours) call your local crisis line, emergency room or 911.      Electronically signed by: Janet Lunsford, September 14, 2017    Depression Self Care Plan / Survival Kit    Self-Care for Depression  Here s the deal. Your body and mind are really not as separate as most people think.  What you do and think affects how you feel and how you feel influences what you do and think. This means if you do things that people who feel good do, it will help you feel better.  Sometimes this is all it takes.  There is also a place for medication and therapy depending on how severe your depression is, so be sure to consult with your medical provider and/ or Behavioral Health Consultant if your symptoms are worsening or not improving.     In order to better manage my stress, I will:    Exercise  Get some form of exercise, every day. This will help reduce pain and release endorphins, the  feel good  chemicals in your brain. This is almost as good as taking antidepressants!  This is not the same as joining a gym and then never going! (they count on that by the way ) It can be as simple as just going for a walk or doing some gardening, anything that will get you moving.      Hygiene   Maintain good hygiene (Get out of bed in the morning, Make your bed, Brush your teeth, Take a shower, and Get dressed like you were going to work, even if you are unemployed).  If your clothes don't fit try to get ones that do.    Diet  I will strive to eat foods that are good for me, drink plenty of water, and avoid excessive sugar, caffeine, alcohol, and other mood-altering substances.  Some foods that are helpful in depression are: complex carbohydrates, B vitamins, flaxseed, fish or fish oil, fresh fruits and vegetables.    Psychotherapy  I agree to participate in Individual Therapy (if recommended).    Medication  If prescribed medications,  I agree to take them.  Missing doses can result in serious side effects.  I understand that drinking alcohol, or other illicit drug use, may cause potential side effects.  I will not stop my medication abruptly without first discussing it with my provider.    Staying Connected With Others  I will stay in touch with my friends, family members, and my primary care provider/team.    Use your imagination  Be creative.  We all have a creative side; it doesn t matter if it s oil painting, sand castles, or mud pies! This will also kick up the endorphins.    Witness Beauty  (AKA stop and smell the roses) Take a look outside, even in mid-winter. Notice colors, textures. Watch the squirrels and birds.     Service to others  Be of service to others.  There is always someone else in need.  By helping others we can  get out of ourselves  and remember the really important things.  This also provides opportunities for practicing all the other parts of the program.    Humor  Laugh and be silly!  Adjust your TV habits for less news and crime-drama and more comedy.    Control your stress  Try breathing deep, massage therapy, biofeedback, and meditation. Find time to relax each day.     My support system    Clinic Contact:  Phone number:    Contact 1:  Phone number:    Contact 2:  Phone number:    Synagogue/:  Phone number:    Therapist:  Phone number:    Local crisis center:    Phone number:    Other community support:  Phone number:

## 2017-09-28 ENCOUNTER — OFFICE VISIT (OUTPATIENT)
Dept: FAMILY MEDICINE | Facility: CLINIC | Age: 77
End: 2017-09-28
Payer: COMMERCIAL

## 2017-09-28 VITALS
WEIGHT: 162 LBS | RESPIRATION RATE: 16 BRPM | HEART RATE: 61 BPM | OXYGEN SATURATION: 99 % | BODY MASS INDEX: 27.38 KG/M2 | SYSTOLIC BLOOD PRESSURE: 148 MMHG | DIASTOLIC BLOOD PRESSURE: 78 MMHG | TEMPERATURE: 97.8 F

## 2017-09-28 DIAGNOSIS — E78.5 HYPERLIPIDEMIA LDL GOAL <160: Chronic | ICD-10-CM

## 2017-09-28 DIAGNOSIS — M51.369 DDD (DEGENERATIVE DISC DISEASE), LUMBAR: Chronic | ICD-10-CM

## 2017-09-28 DIAGNOSIS — M17.0 PRIMARY OSTEOARTHRITIS OF BOTH KNEES: Chronic | ICD-10-CM

## 2017-09-28 DIAGNOSIS — G44.219 EPISODIC TENSION-TYPE HEADACHE, NOT INTRACTABLE: ICD-10-CM

## 2017-09-28 DIAGNOSIS — N64.4 PAIN OF LEFT BREAST: Primary | ICD-10-CM

## 2017-09-28 DIAGNOSIS — K06.8 GINGIVAL BLEEDING: ICD-10-CM

## 2017-09-28 DIAGNOSIS — A15.0 PULMONARY TUBERCULOSIS: ICD-10-CM

## 2017-09-28 DIAGNOSIS — I10 ESSENTIAL HYPERTENSION, BENIGN: ICD-10-CM

## 2017-09-28 DIAGNOSIS — K21.9 GASTROESOPHAGEAL REFLUX DISEASE WITHOUT ESOPHAGITIS: Chronic | ICD-10-CM

## 2017-09-28 DIAGNOSIS — A18.2 TUBERCULOSIS OF PERIPHERAL LYMPH NODES: ICD-10-CM

## 2017-09-28 PROCEDURE — 99213 OFFICE O/P EST LOW 20 MIN: CPT | Performed by: FAMILY MEDICINE

## 2017-09-28 RX ORDER — CHLORHEXIDINE GLUCONATE ORAL RINSE 1.2 MG/ML
15 SOLUTION DENTAL 2 TIMES DAILY
Qty: 120 ML | Refills: 3 | Status: SHIPPED | OUTPATIENT
Start: 2017-09-28 | End: 2018-04-03

## 2017-09-28 RX ORDER — AMOXICILLIN 250 MG/1
250 CAPSULE ORAL 3 TIMES DAILY
Qty: 30 CAPSULE | Refills: 1 | Status: SHIPPED | OUTPATIENT
Start: 2017-09-28 | End: 2018-02-16

## 2017-09-28 NOTE — PROGRESS NOTES
SUBJECTIVE:   Rody Gonzalez is a 77 year old female who presents to clinic today for the following health issues:      Breast pain      Duration: 2 month    Description (location/character/radiation): left breast    Intensity:  Getting worse    Accompanying signs and symptoms: feels a pinching, can feel a few times a day    History (similar episodes/previous evaluation): None    Precipitating or alleviating factors: mammogram many years ago    Therapies tried and outcome: None       .GAYLA VEGA MD .9/28/2017 2:24 PM .September 28, 2017    Rody Gonzalez is a 77 year old female who is who presents with  RIGHT BREAST  TENDERNESS RIGHT UPPER INNER QUADRANT   Onset :  2 MONTHS  Severity:  MILD TO MODERATE   Home treatments  NORMAL    Additional Symptoms:  PAIN AND TENDERNESS    Course  ONGOING   NO MASS FOUND ON EXAM   BILATERAL GINGIVITIS with BLEEDIN   HYPERTENSION WITH GOAL OF LESS THAN 140/80 CONTROLLED CURRENT MEDICATIONS   OCCASIONAL HEADACHES         .  Current Outpatient Prescriptions   Medication Sig Dispense Refill     chlorhexidine (CHLORHEXIDINE) 0.12 % solution Swish and spit 15 mLs in mouth 2 times daily 120 mL 3     amoxicillin (AMOXIL) 250 MG capsule Take 1 capsule (250 mg) by mouth 3 times daily 30 capsule 1     Cholecalciferol (VITAMIN D3) 2000 UNITS TABS Take 2 tablets by mouth daily (with breakfast) 60 tablet 11     omeprazole (KLS OMPERAZOLE) 20 MG tablet Take 1 tablet (20 mg) by mouth daily 30 tablet 11     order for DME One cane of any material   One  As directed  adjustible if necessary (Patient not taking: Reported on 9/28/2017) 1 each 0     amoxicillin (AMOXIL) 500 MG capsule Take 1 capsule (500 mg) by mouth 3 times daily (Patient not taking: Reported on 9/28/2017) 30 capsule 0     acetaminophen (MAPAP) 325 MG tablet Take 1-2 tablets (325-650 mg) by mouth every 6 hours as needed for mild pain (Patient not taking: Reported on 9/28/2017) 180 tablet 11     metoprolol (TOPROL-XL) 25 MG 24  hr tablet Take 0.5 tablets (12.5 mg) by mouth At Bedtime (Patient not taking: Reported on 9/28/2017) 45 tablet 3     aspirin-acetaminophen-caffeine (EXCEDRIN MIGRAINE) 250-250-65 MG per tablet Take 1 tablet by mouth every 6 hours as needed for headaches (Patient not taking: Reported on 9/28/2017) 80 tablet 11     lidocaine (XYLOCAINE) 5 % ointment One application 4 x daily to affected areas lower back and knees if necessary  Profile Rx: patient will contact pharmacy when needed (Patient not taking: Reported on 9/28/2017) 142 g 11     atorvastatin (LIPITOR) 10 MG tablet Take 1 tablet (10 mg) by mouth daily (Patient not taking: Reported on 9/28/2017) 30 tablet 11     fluticasone (FLONASE) 50 MCG/ACT spray Spray 1 spray into both nostrils daily (Patient not taking: Reported on 9/28/2017) 16 g 11     hydrocortisone (ANUSOL-HC) 2.5 % cream Place rectally 2 times daily (Patient not taking: Reported on 9/28/2017) 30 g 11     polyethylene glycol (MIRALAX) powder Take 17 g (1 capful) by mouth daily (Patient not taking: Reported on 9/28/2017) 510 g 1     alum & mag hydroxide-simethicone (MYLANTA ES/MAALOX  ES) 400-400-40 MG/5ML SUSP suspension Take 30 mLs by mouth 4 times daily as needed for indigestion (Patient not taking: Reported on 9/28/2017) 1 Bottle 11     order for DME Cane of any materials of adujustable priya  *one   Use as directed* (Patient not taking: Reported on 7/18/2017) 1 each 0     hydrocortisone (CORTAID) 1 % cream Apply sparingly to affected area three times daily for 14 days. (Patient not taking: Reported on 7/18/2017) 30 g 5     loratadine (QC LORATADINE ALLERGY RELIEF) 10 MG tablet Take 1 tablet (10 mg) by mouth daily as needed (Patient not taking: Reported on 7/18/2017) 30 tablet 11     aspirin 81 MG chewable tablet Take 1 tablet (81 mg) by mouth daily (Patient not taking: Reported on 7/18/2017) 108 tablet 3     glucosamine-chondroitinoitin ([facility] GLUCOSAMINE-CHONDROITIN) 500-400 MG TABS Take 2 tablets by  mouth 2 times daily (Patient not taking: Reported on 2017) 120 tablet 11        No Known Allergies    Immunization History   Administered Date(s) Administered     Pneumococcal 23 valent 2006     Tdap (Adacel,Boostrix) 2006         reports that she does not drink alcohol.      reports that she does not use illicit drugs.    family history includes Family History Negative in her father and mother.    indicated that her mother is . She indicated that her father is .      has a past surgical history that includes Hysterectomy ().     reports that she does not currently engage in sexual activity.  .  Pediatric History   Patient Guardian Status     Not on file.     Other Topics Concern     Parent/Sibling W/ Cabg, Mi Or Angioplasty Before 65f 55m? No     Social History Narrative         reports that she has never smoked. She has never used smokeless tobacco.    Medical, social, surgical, and family histories reviewed.    Labs reviewed in EPIC  Patient Active Problem List   Diagnosis     Health Care Home     Gastroesophageal reflux disease without esophagitis     DDD (degenerative disc disease), lumbar     Dysuria     Anxiety state     Urinary frequency     Tuberculosis of peripheral lymph nodes     Nonspecific abnormal results of thyroid function study     Pulmonary tuberculosis     PPD positive     Papanicolaou smear of cervix with atypical squamous cells of undetermined significance (ASC-US)     Osteoporosis     Tear film insufficiency     Memory loss     Headache     Abdominal pain     Closed fracture of triquetral (cuneiform) bone of wrist     Other chronic pain     Abnormality of gait     Hearing loss     Malaise and fatigue     Acute cystitis     Major depression in complete remission (H)     Sensory hearing loss, unilateral     Vitamin D deficiency disease     Hyperlipidemia LDL goal <160     Overweight (BMI 25.0-29.9)     Risk for falls     ACP (advance care planning)      Primary osteoarthritis of both knees     Tension headache     History of bloody stools     Past Surgical History:   Procedure Laterality Date     HYSTERECTOMY  2004       Social History   Substance Use Topics     Smoking status: Never Smoker     Smokeless tobacco: Never Used     Alcohol use No     Family History   Problem Relation Age of Onset     Family History Negative Mother      Family History Negative Father          No Known Allergies  Recent Labs   Lab Test  07/18/17   1605  01/12/17   1510  04/13/16   0943  12/04/15   1530  04/10/15   1257  03/04/14   1050  09/13/13   1125   LDL   --    --   126*  125*  116  134*  120   HDL   --    --   41*  53  42*  42*  41*   TRIG   --    --   131  100  77  84  99   ALT  25   --    --   30  44  22   --    CR  0.67  0.63  0.70   --   0.80  0.70  0.70   GFRESTIMATED  85  >90  Non  GFR Calc    81   --   70  82  82   GFRESTBLACK  >90   GFR Calc    >90   GFR Calc    >90   --   85  >90  >90   POTASSIUM  4.0  4.2  4.0   --   4.7  4.5  4.4   TSH   --    --    --    --    --   1.47  1.86        BP Readings from Last 6 Encounters:   09/28/17 148/78   09/14/17 128/68   07/18/17 128/64   06/15/17 118/64   06/01/17 128/64   03/31/17 144/80       Wt Readings from Last 3 Encounters:   09/28/17 162 lb (73.5 kg)   09/14/17 159 lb 8 oz (72.3 kg)   07/18/17 158 lb (71.7 kg)         Positive symptoms or findings indicated by bold designation:     ROS: 10 point ROS neg other than the symptoms noted above in the HPI.except  has Health Care Home; Gastroesophageal reflux disease without esophagitis; DDD (degenerative disc disease), lumbar; Dysuria; Anxiety state; Urinary frequency; Tuberculosis of peripheral lymph nodes; Nonspecific abnormal results of thyroid function study; Pulmonary tuberculosis; PPD positive; Papanicolaou smear of cervix with atypical squamous cells of undetermined significance (ASC-US); Osteoporosis; Tear film insufficiency;  Memory loss; Headache; Abdominal pain; Closed fracture of triquetral (cuneiform) bone of wrist; Other chronic pain; Abnormality of gait; Hearing loss; Malaise and fatigue; Acute cystitis; Major depression in complete remission (H); Sensory hearing loss, unilateral; Vitamin D deficiency disease; Hyperlipidemia LDL goal <160; Overweight (BMI 25.0-29.9); Risk for falls; ACP (advance care planning); Primary osteoarthritis of both knees; Tension headache; and History of bloody stools on her problem list.   Constitutional: The patient denied fatigue, fever, insomnia, night sweats, recent illness and weight loss.  Weight is stable     Eyes: The patient denied blindness, eye pain, eye tearing, photophobia, vision change and visual disturbance. Normal       Ears/Nose/Throat/Neck: The patient denied dizziness, facial pain, hearing loss, nasal discharge, oral pain, otalgia, postnasal drip, sinus congestion, sore throat, tinnitus and voice change.   Normal     Cardiovascular: The patient denied arrhythmia, chest pain/pressure, claudication, edema, exercise intolerance, fatigue, orthopnea, palpitations and syncope.  Normal     Respiratory: The patient denied asthma, chest congestion, cough, dyspnea on exertion, dyspnea/shortness of breath, hemoptysis, pedal edema, pleuritic pain, productive sputum, snoring and wheezing. History of TB TREATMENT      Gastrointestinal: The patient denied abdominal pain, anorexia, constipation, diarrhea, dysphagia, gastroesophageal reflux, hematochezia, hemorrhoids, melena, nausea and vomiting . GASTROESOPHAGEAL REFLUX DISEASE WITHOUT ESOPHAGITIS     Genitourinary/Nephrology: The patient denied breast complaint, dysuria, nocturia sexual dysfunction, t, urinary frequency, urinary incontinence, urinary urgency        Musculoskeletal: The patient denied arthralgia(s), back pain, joint complaint, muscle weakness, myalgias, osteoporosis, sciatica, stiffness and swelling.  OSTEOARTHRITIS KNEE PAIN      Dermatoligic:: The patient denied acne, dermatitis, ecchymosis, itching, mole change, rash, skin cancer, skin lesion and sores.  NORMAL     Neurologic: The patient denied dizziness, gait abnormality, headache, memory loss, mental status change, paresis, paresthesia, seizure, syncope, tremor and vision change.  MEMORY LOSS    Psychiatric: The patient denied anxiety, depression, disturbances of memory, drug abuse, insomnia, mood swings and relationship difficulties.    TALKS TO SELF    Endocrine: The patient denied , goiter, obesity, polyuria and thyroid disease.  NORMAL     Hematologic/Lymphatic: The patient denied abnormal bleeding and bruising, abnormal ecchymoses, anemia, lymph node enlargement/mass, petechiae and venous  Thrombosis. NORMAL     Allergy/Immunology: The patient denied food allergy and  Allergic rhinitis or conjunctivitis.  NORMAL       PE:  /78  Pulse 61  Temp 97.8  F (36.6  C) (Tympanic)  Resp 16  Wt 162 lb (73.5 kg)  SpO2 99%  BMI 27.38 kg/m2 Body mass index is 27.38 kg/(m^2).    Constitutional: general appearance, well nourished, well developed, in no acute distress, well developed, appears stated age, normal body habitus,  NORMAL     Eyes:; The patient has normal eyelids sclerae and conjunctivae :NL      Ears/Nose/Throat: external ear, overall: normal appearance; external nose, overall: benign appearance, normal moujth gums and lips  The patient has: WITHIN NORMAL LIMITS     Neck: thyroid, overall: normal size, normal consistency, nontender,  NORMAL     Respiratory:  palpation of chest, overall: normal excursion, NORMAL   Clear to percussion and auscultation  NORMAL     Tachypnea  NORMAL   Color  NORMAL     Cardiovascular:  Good color with no peripheral edema NORMAL   Regular sinus rhythm without murmur. Physiologic heart sounds Heart is unelarged  .   Chest/Breast: normal shape NORMAL    TENDERNESS LEFT  UPPER INNER QUADRANT MIDLINE  NO DOMIINANT MASS    Abdominal exam,  Liver  and spleen are  unenlarged NORMAL       Tenderness NORMAL   Scars  NORMAL     Urogenital; no renal, flank or bladder  tenderness;  left     Lymphatic: neck nodes,  NORMAL    Other nodes NOT APPLICABLE     Musculoskeletal:  Brief ortho exam normal except:   NORMAL     Integument: inspection of skin, no rash, lesions; and, palpation, no induration, no tenderness. NORMAL     Neurologic mental status, overall: alert and oriented; gait, no ataxia, no unsteadiness; coordination, no tremors; cranial nerves, overall: normal motor, overall: normal bulk, tone. NORMAL     Psychiatric: orientation/consciousness, overall: oriented to person, place and time; behavior/psychomotor activity, no tics, normal psychomotor activity; mood and affect, overall: normal mood and affect; appearance, overall: well-groomed, good eye contact; speech, overall: normal quality, no aphasia and normal quality, quantity, intact. NORMAL     Diagnostic Test Results:  Results for orders placed or performed in visit on 07/18/17   Comprehensive metabolic panel   Result Value Ref Range    Sodium 141 133 - 144 mmol/L    Potassium 4.0 3.4 - 5.3 mmol/L    Chloride 110 (H) 94 - 109 mmol/L    Carbon Dioxide 24 20 - 32 mmol/L    Anion Gap 7 3 - 14 mmol/L    Glucose 76 70 - 99 mg/dL    Urea Nitrogen 12 7 - 30 mg/dL    Creatinine 0.67 0.52 - 1.04 mg/dL    GFR Estimate 85 >60 mL/min/1.7m2    GFR Estimate If Black >90   GFR Calc   >60 mL/min/1.7m2    Calcium 10.5 (H) 8.5 - 10.1 mg/dL    Bilirubin Total 0.3 0.2 - 1.3 mg/dL    Albumin 3.8 3.4 - 5.0 g/dL    Protein Total 7.6 6.8 - 8.8 g/dL    Alkaline Phosphatase 78 40 - 150 U/L    ALT 25 0 - 50 U/L    AST 28 0 - 45 U/L   Vitamin D Deficiency   Result Value Ref Range    Vitamin D Deficiency screening 31 20 - 75 ug/L   CBC with platelets   Result Value Ref Range    WBC 5.4 4.0 - 11.0 10e9/L    RBC Count 4.24 3.8 - 5.2 10e12/L    Hemoglobin 12.7 11.7 - 15.7 g/dL    Hematocrit 38.2 35.0 - 47.0 %    MCV 90  78 - 100 fl    MCH 30.0 26.5 - 33.0 pg    MCHC 33.2 31.5 - 36.5 g/dL    RDW 13.3 10.0 - 15.0 %    Platelet Count 175 150 - 450 10e9/L   Erythrocyte sedimentation rate auto   Result Value Ref Range    Sed Rate 18 0 - 30 mm/h         ICD-10-CM    1. Pain of left breast N64.4 BREAST CENTER REFERRAL     [facility] Diagnostic Digital Bilateral     2 month hx  rt upper breast 1 cm to rt of midline intermittent pain   2. Gastroesophageal reflux disease without esophagitis K21.9    3. DDD (degenerative disc disease), lumbar M51.36    4. Hyperlipidemia LDL goal <160 E78.5    5. Primary osteoarthritis of both knees M17.0    6. 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 Pulmonary tuberculosis A15.0     2012 treated    8. Gingival bleeding K06.8 chlorhexidine (CHLORHEXIDINE) 0.12 % solution     amoxicillin (AMOXIL) 250 MG capsule        .    Side effects benefits and risks thoroughly discussed. .she may come in early if unimproved or getting worse          Importance of adhering to regimen discussed and if medications were dispensed, the importance of taking medications discussed and bringing in the medications after every visit for chronic problems         Please drink 2 glasses of water prior to meals and walk 15-30 minutes after meals    I spent 25 MINUTES SPENT  with patient discussing the following issues   The primary encounter diagnosis was Pain of left breast. Diagnoses of Gastroesophageal reflux disease without esophagitis, DDD (degenerative disc disease), lumbar, Hyperlipidemia LDL goal <160, Primary osteoarthritis of both knees, Tuberculosis of peripheral lymph nodes, Pulmonary tuberculosis, and Gingival bleeding were also pertinent to this visit. over half of which involved counseling and coordination of care.    There are no Patient Instructions on file for this visit.      ALL THE ABOVE PROBLEMS ARE STABLE AND MED CHANGES AS NOTED    Diet:  MEDITERRANEAN DIET     Exercise:  NORMAL RANGE OF MOTION    Exercises Range of motion, balance, isometric, and strengthening exercises 30 repetitions twice daily of involved joints      .GAYLA VEGA MD 9/28/2017 2:24 PM  September 28, 2017

## 2017-09-28 NOTE — NURSING NOTE
"Chief Complaint   Patient presents with     Breast Pain     right breast       Initial /78  Pulse 61  Temp 97.8  F (36.6  C) (Tympanic)  Resp 16  Wt 162 lb (73.5 kg)  SpO2 99%  BMI 27.38 kg/m2 Estimated body mass index is 27.38 kg/(m^2) as calculated from the following:    Height as of 1/12/17: 5' 4.5\" (1.638 m).    Weight as of this encounter: 162 lb (73.5 kg).  Medication Reconciliation: complete   Janet Lunsford CMA    "

## 2017-09-28 NOTE — MR AVS SNAPSHOT
After Visit Summary   9/28/2017    Rody Gonzalez    MRN: [id number]           Patient Information     Date Of Birth          1940        Visit Information        Provider Department      9/28/2017 2:00 PM Osman Yen MD; [employer] [facility]        Today's Diagnoses     Pain of left breast    -  1    Gastroesophageal reflux disease without esophagitis        DDD (degenerative disc disease), lumbar        Hyperlipidemia LDL goal <160        Primary osteoarthritis of both knees        Tuberculosis of peripheral lymph nodes        Pulmonary tuberculosis        Gingival bleeding          Care Instructions    (N64.4) Pain of left breast  (primary encounter diagnosis)  Comment:  2 month hx  rt upper breast 1 cm to rt of midline intermittent pain  Plan: BREAST CENTER REFERRAL, [place] Diagnostic Digital         Bilateral  RIGHT UPPER INNER QUADRANT BREAST              (K21.9) Gastroesophageal reflux disease without esophagitis  Comment:    Plan:      (M51.36) DDD (degenerative disc disease), lumbar  Comment:    Plan:      (E78.5) Hyperlipidemia LDL goal <160  Comment:    Plan:      (M17.0) Primary osteoarthritis of both knees  Comment:    Plan:      (A18.2) Tuberculosis of peripheral lymph nodes  Comment: 2012 treated   Plan:      (A15.0) Pulmonary tuberculosis  Comment: 2012 treated   Plan:      (K06.8) Gingival bleeding  Comment:    Plan: chlorhexidine (CHLORHEXIDINE) 0.12 % solution,         amoxicillin (AMOXIL) 250 MG capsule         THREE TIMES DAILY   DENTIST AS SOON AS POSSIBLE                Follow-ups after your visit        Additional Services     BREAST CENTER REFERRAL       Your provider has referred you to: [facility]: [facility] Galindo Johnson [phone]   [email or web address]  Pain left breat x 2 months  No mass felt by patient      Please be aware that coverage of these  "services is subject to the terms and limitations of your health insurance plan.  Call member services at your health plan with any benefit or coverage questions.      Please bring the following with you to your appointment:    (1) Any X-Rays, CTs or MRIs which have been performed.  Contact the facility where they were done to arrange for  prior to your scheduled appointment.    (2) List of current medications   (3) This referral request   (4) Any documents/labs given to you for this referral                  Follow-up notes from your care team     Return in about 4 weeks (around 10/26/2017).      Future tests that were ordered for you today     Open Future Orders        Priority Expected Expires Ordered    US Breast Left Routine  9/28/2018 9/28/2017    [place] Diagnostic Digital Bilateral Routine  9/28/2018 9/28/2017            Who to contact     If you have questions or need follow up information about today's clinic visit or your schedule please contact [facility] directly at [phone].  Normal or non-critical lab and imaging results will be communicated to you by MyChart, letter or phone within 4 business days after the clinic has received the results. If you do not hear from us within 7 days, please contact the clinic through [employer] or phone. If you have a critical or abnormal lab result, we will notify you by phone as soon as possible.  Submit refill requests through [employer] or call your pharmacy and they will forward the refill request to us. Please allow 3 business days for your refill to be completed.          Additional Information About Your Visit        [employer] Information     [employer] lets you send messages to your doctor, view your test results, renew your prescriptions, schedule appointments and more. To sign up, go to [email or web address] . Click on \"Log in\" on the left side of the screen, which will take you to the Welcome page. Then click on \"Sign up Now\" " on the right side of the page.     You will be asked to enter the access code listed below, as well as some personal information. Please follow the directions to create your username and password.     Your access code is: [email or web address]  Expires: 2017  3:49 PM     Your access code will  in 90 days. If you need help or a new code, please call your [place] clinic or [phone].        Care EveryWhere ID     This is your Care EveryWhere ID. This could be used by other organizations to access your [place] medical records  [id number]        Your Vitals Were     Pulse Temperature Respirations Pulse Oximetry BMI (Body Mass Index)       61 97.8  F (36.6  C) (Tympanic) 16 99% 27.38 kg/m2        Blood Pressure from Last 3 Encounters:   17 148/78   17 128/68   17 128/64    Weight from Last 3 Encounters:   17 162 lb (73.5 kg)   17 159 lb 8 oz (72.3 kg)   17 158 lb (71.7 kg)              We Performed the Following     BREAST CENTER REFERRAL          Today's Medication Changes          These changes are accurate as of: 17  2:29 PM.  If you have any questions, ask your nurse or doctor.               Start taking these medicines.        Dose/Directions    chlorhexidine 0.12 % solution   Commonly known as:  PERIDEX   Used for:  Gingival bleeding   Started by:  Osman Yen MD        Dose:  15 mL   Swish and spit 15 mLs in mouth 2 times daily   Quantity:  120 mL   Refills:  3         These medicines have changed or have updated prescriptions.        Dose/Directions    * amoxicillin 500 MG capsule   Commonly known as:  AMOXIL   This may have changed:  Another medication with the same name was added. Make sure you understand how and when to take each.   Used for:  Dental abscess   Changed by:  Osman Yen MD        Dose:  500 mg   Take 1 capsule (500 mg) by mouth 3 times daily   Quantity:  30 capsule   Refills:  0       * amoxicillin 250 MG capsule    Commonly known as:  AMOXIL   This may have changed:  You were already taking a medication with the same name, and this prescription was added. Make sure you understand how and when to take each.   Used for:  Gingival bleeding   Changed by:  Osman Yen MD        Dose:  250 mg   Take 1 capsule (250 mg) by mouth 3 times daily   Quantity:  30 capsule   Refills:  1       * Notice:  This list has 2 medication(s) that are the same as other medications prescribed for you. Read the directions carefully, and ask your doctor or other care provider to review them with you.         Where to get your medicines      These medications were sent to [id number] - [address] [facility], [address]  [address] [place] [address]     Phone:  [phone]     amoxicillin 250 MG capsule    chlorhexidine 0.12 % solution                Primary Care Provider Office Phone # Fax #    Osman Yen [phone] [address] [facility] [address]        Equal Access to Services     [facility]: Hadii jenise ku hadasho Soomaali, waaxda luqadaha, qaybta kaalmada adeegyaradha, everton haro . So [facility] [phone].    ATENCIÓN: Si habla español, tiene a hdez disposición servicios gratuitos de asistencia lingüística. Llame al [phone].    We comply with applicable federal civil rights laws and [place] laws. We do not discriminate on the basis of race, color, national origin, age, disability sex, sexual orientation or gender identity.            Thank you!     Thank you for choosing [facility]  for your care. Our goal is always to provide you with excellent care. Hearing back from our patients is one way we can continue to improve our services. Please take a few minutes to complete the written survey that you may receive in the mail after your visit with us.  Thank you!             Your Updated Medication List - Protect others around you: Learn how to safely use, store and throw away your medicines at www.disposemymeds.org.          This list is accurate as of: 9/28/17  2:29 PM.  Always use your most recent med list.                   Brand Name Dispense Instructions for use Diagnosis    acetaminophen 325 MG tablet    MAPAP    180 tablet    Take 1-2 tablets (325-650 mg) by mouth every 6 hours as needed for mild pain    Vitamin D insufficiency       alum & mag hydroxide-simethicone 400-400-40 MG/5ML Susp suspension    MYLANTA ES/MAALOX  ES    1 Bottle    Take 30 mLs by mouth 4 times daily as needed for indigestion    Gastroesophageal reflux disease without esophagitis       * amoxicillin 500 MG capsule    AMOXIL    30 capsule    Take 1 capsule (500 mg) by mouth 3 times daily    Dental abscess       * amoxicillin 250 MG capsule    AMOXIL    30 capsule    Take 1 capsule (250 mg) by mouth 3 times daily    Gingival bleeding       aspirin 81 MG chewable tablet     108 tablet    Take 1 tablet (81 mg) by mouth daily    Hyperlipidemia LDL goal <160       aspirin-acetaminophen-caffeine 250-250-65 MG per tablet    EXCEDRIN MIGRAINE    80 tablet    Take 1 tablet by mouth every 6 hours as needed for headaches    Tension headache       atorvastatin 10 MG tablet    LIPITOR    30 tablet    Take 1 tablet (10 mg) by mouth daily    Hyperlipidemia LDL goal <160       chlorhexidine 0.12 % solution    PERIDEX    120 mL    Swish and spit 15 mLs in mouth 2 times daily    Gingival bleeding       fluticasone 50 MCG/ACT spray    FLONASE    16 g    Spray 1 spray into both nostrils daily    Chronic seasonal allergic rhinitis due to pollen       glucosamine-chondroitinoitin 500-400 MG Tabs    [facility] GLUCOSAMINE-CHONDROITIN    120 tablet    Take 2 tablets by mouth 2 times daily    Primary osteoarthritis of both knees       hydrocortisone 1 % cream    CORTAID    30 g    Apply sparingly to affected area  three times daily for 14 days.    External hemorrhoids       hydrocortisone 2.5 % cream    ANUSOL-HC    30 g    Place rectally 2 times daily    External hemorrhoids       lidocaine 5 % ointment    XYLOCAINE    142 g    One application 4 x daily to affected areas lower back and knees if necessary Profile Rx: patient will contact pharmacy when needed    Age related osteoporosis, unspecified pathological fracture presence       loratadine 10 MG tablet    QC LORATADINE ALLERGY RELIEF    30 tablet    Take 1 tablet (10 mg) by mouth daily as needed    Seasonal allergic rhinitis       metoprolol 25 MG 24 hr tablet    TOPROL-XL    45 tablet    Take 0.5 tablets (12.5 mg) by mouth At Bedtime    Episodic tension-type headache, not intractable, Essential hypertension, benign       omeprazole 20 MG tablet    KLS OMPERAZOLE    30 tablet    Take 1 tablet (20 mg) by mouth daily    Gastroesophageal reflux disease without esophagitis       * order for DME     1 each    Cane of any materials of adujustable priya *one  Use as directed*    Tendency to fall       * order for DME     1 each    One cane of any material  One As directed adjustible if necessary    Primary osteoarthritis of both knees, Tendency to fall       polyethylene glycol powder    MIRALAX    510 g    Take 17 g (1 capful) by mouth daily    Slow transit constipation       Vitamin D3 2000 UNITS Tabs     60 tablet    Take 2 tablets by mouth daily (with breakfast)    Vitamin D insufficiency       * Notice:  This list has 4 medication(s) that are the same as other medications prescribed for you. Read the directions carefully, and ask your doctor or other care provider to review them with you.

## 2017-09-28 NOTE — PATIENT INSTRUCTIONS
(N64.4) Pain of left breast  (primary encounter diagnosis)  Comment:  2 month hx  rt upper breast 1 cm to rt of midline intermittent pain  Plan: BREAST CENTER REFERRAL, [place] Diagnostic Digital         Bilateral  RIGHT UPPER INNER QUADRANT BREAST              (K21.9) Gastroesophageal reflux disease without esophagitis  Comment:    Plan:      (M51.36) DDD (degenerative disc disease), lumbar  Comment:    Plan:      (E78.5) Hyperlipidemia LDL goal <160  Comment:    Plan:      (M17.0) Primary osteoarthritis of both knees  Comment:    Plan:      (A18.2) Tuberculosis of peripheral lymph nodes  Comment: 2012 treated   Plan:      (A15.0) Pulmonary tuberculosis  Comment: 2012 treated   Plan:      (K06.8) Gingival bleeding  Comment:    Plan: chlorhexidine (CHLORHEXIDINE) 0.12 % solution,         amoxicillin (AMOXIL) 250 MG capsule         THREE TIMES DAILY   DENTIST AS SOON AS POSSIBLE

## 2017-10-03 ENCOUNTER — HOSPITAL ENCOUNTER (OUTPATIENT)
Dept: MAMMOGRAPHY | Facility: CLINIC | Age: 77
End: 2017-10-03
Attending: FAMILY MEDICINE
Payer: COMMERCIAL

## 2017-10-03 ENCOUNTER — HOSPITAL ENCOUNTER (OUTPATIENT)
Dept: MAMMOGRAPHY | Facility: CLINIC | Age: 77
Discharge: HOME | End: 2017-10-03
Attending: FAMILY MEDICINE | Admitting: FAMILY MEDICINE
Payer: COMMERCIAL

## 2017-10-03 DIAGNOSIS — N64.4 PAIN OF LEFT BREAST: ICD-10-CM

## 2017-10-03 PROBLEM — I10 ESSENTIAL HYPERTENSION, BENIGN: Status: ACTIVE | Noted: 2017-10-03

## 2017-10-03 PROCEDURE — G0204 DX MAMMO INCL CAD BI: HCPCS

## 2017-10-03 PROCEDURE — 76642 ULTRASOUND BREAST LIMITED: CPT | Mod: LT

## 2017-10-03 RX ORDER — METOPROLOL SUCCINATE 25 MG/1
12.5 TABLET, EXTENDED RELEASE ORAL AT BEDTIME
Qty: 45 TABLET | Refills: 3 | Status: SHIPPED | OUTPATIENT
Start: 2017-10-03 | End: 2018-04-03

## 2017-10-03 NOTE — LETTER
October 4, 2017      Rody Gonzalez  [address]   [place] [address]        Dear ,    We are writing to inform you of your test results.    CONGRATULATIONS   NORMAL RESULT   DIAGNOSTIC MAMMOGRAM AND NORMAL BREAST ULTRASOUND   REPEAT MAMMOGRAM ONE YEAR   YOU DID A GREAT JOB   VAISHALI LEWIS CARMELLA CONNER LOKI     Resulted Orders   US Breast Left    Narrative    DIAGNOSTIC MAMMOGRAM BILATERAL DIGITAL w/CAD w/TOMOSYNTHESIS  ULTRASOUND LEFT  BREAST  10/3/2017    HISTORY: Intermittent pain in the medial left breast for several  months.    COMPARISON:  None.    BREAST DENSITY: Almost entirely fat    FINDINGS:  No suspicious findings on mammography. Ultrasound at 10:00  in the region of pain is normal.     Any further evaluation should be based on clinical findings and  clinical suspicion.      Impression    IMPRESSION: BI-RADS CATEGORY: 1 -  NEGATIVE    RECOMMENDED FOLLOW-UP: Annual Mammography    SUZE ELLIS MD       If you have any questions or concerns, please call the clinic at the number listed above.       Sincerely,        GAYLA VEGA MD

## 2017-10-03 NOTE — LETTER
[facility]  [address], [address]  [place] [place] [facility] [address]                                                                                                            Rody Gonzalez  [address]   [facility] [address]      October 3, 2017  Date of Exam:     Dear Rody:    Thank you for your recent visit.  Breast Imaging Result: We are pleased to inform you that the results of your recent breast imaging show no evidence of malignancy (cancer).    If you are experiencing any breast problems such as a lump or localized pain we request that you discuss this with your health care provider if you haven t already done so, as additional testing may be necessary.    As you know, early detection of cancer is very important. Although mammography is the most accurate method for early detection, not all cancers are found through mammography. A thorough examination includes a combination of mammography, physical examination and breast self-examination. Currently the American College of Radiology and the Society of Breast Imaging recommend an annual mammogram for all women beginning at the age of 40.    A report of your breast imaging results was sent to: Osman Yen    Your breast imaging will become part of your medical file here at [place] for at least 10 years. You are responsible for informing any new health care provider or breast imaging facility of the date and location of this examination.    We appreciate the opportunity to participate in your health care.    Sincerely,    Lexie Fernandez MD  Interpreting Radiologist  [facility]

## 2017-10-04 NOTE — PROGRESS NOTES
Please send normal lab letter when labs are complete  CONGRATULATIONS   NORMAL RESULT   DIAGNOSTIC MAMMOGRAM AND NORMAL BREAST ULTRASOUND   REPEAT MAMMOGRAM ONE YEAR   YOU DID A GREAT JOB   VAISHALI VEGA JR., MD

## 2018-02-06 ENCOUNTER — CARE COORDINATION (OUTPATIENT)
Dept: GERIATRIC MEDICINE | Facility: CLINIC | Age: 78
End: 2018-02-06

## 2018-02-06 NOTE — PROGRESS NOTES
Placed a call to member to schedule, phone rings but no option available to leave a VM.  Also, placed a call to member's daughter, Eve and left a VM requesting a call back.  Johnna Malin RN N  [facility]   Phone:   [phone]  Fax:       [phone]

## 2018-02-07 NOTE — PROGRESS NOTES
Placed a call to member and spoke to member's daughter, Eve. Scheduled annual home visit on Tuesday, 02/20/2018.  Johnna Malin RN N  [facility]   Phone:   [phone]  Fax:       [phone]

## 2018-02-16 ENCOUNTER — OFFICE VISIT (OUTPATIENT)
Dept: FAMILY MEDICINE | Facility: CLINIC | Age: 78
End: 2018-02-16
Payer: COMMERCIAL

## 2018-02-16 VITALS
TEMPERATURE: 97.8 F | BODY MASS INDEX: 26.99 KG/M2 | RESPIRATION RATE: 14 BRPM | HEART RATE: 61 BPM | OXYGEN SATURATION: 100 % | DIASTOLIC BLOOD PRESSURE: 82 MMHG | SYSTOLIC BLOOD PRESSURE: 140 MMHG | WEIGHT: 162 LBS | HEIGHT: 65 IN

## 2018-02-16 DIAGNOSIS — E55.9 VITAMIN D DEFICIENCY DISEASE: ICD-10-CM

## 2018-02-16 DIAGNOSIS — Z91.81 AT RISK FOR FALLING: ICD-10-CM

## 2018-02-16 DIAGNOSIS — K21.9 GASTROESOPHAGEAL REFLUX DISEASE WITHOUT ESOPHAGITIS: Primary | Chronic | ICD-10-CM

## 2018-02-16 DIAGNOSIS — E78.5 HYPERLIPIDEMIA LDL GOAL <160: Chronic | ICD-10-CM

## 2018-02-16 DIAGNOSIS — I10 ESSENTIAL HYPERTENSION, BENIGN: ICD-10-CM

## 2018-02-16 PROCEDURE — 99214 OFFICE O/P EST MOD 30 MIN: CPT | Performed by: FAMILY MEDICINE

## 2018-02-16 NOTE — PROGRESS NOTES
"  SUBJECTIVE:   Rody Gonzalez is a 78 year old female who presents to clinic today for the following health issues:  Pt here with her daughter and a Turkish   (daughter is on her cell phone for most of appointment)    Special Diet Forms      Amount of exercise or physical activity: None    Problems taking medications regularly: No    Medication side effects: none    Diet: low cholesterol, low salt high residue    Previous diet from from 2015 found in Media, daughter and  state that diet needs have remained the same    Hyperlipidemia Follow-Up      Rate your low fat/cholesterol diet?: good    Taking statin?  Yes, no muscle aches from statin    Other lipid medications/supplements?:  none    Hypertension Follow-up      Outpatient blood pressures are not being checked.    Low Salt Diet: no added salt      Problem list and histories reviewed & adjusted, as indicated.  Additional history: as documented    Labs reviewed in EPIC    Reviewed and updated as needed this visit by clinical staff  Tobacco  Allergies  Meds  Problems  Med Hx  Surg Hx  Fam Hx  Soc Hx        Reviewed and updated as needed this visit by Provider  Allergies  Meds  Problems         ROS:  CONSTITUTIONAL:NEGATIVE for fever, chills, change in weight  RESP:NEGATIVE for significant cough or SOB  CV: NEGATIVE for chest pain, palpitations or peripheral edema  MUSCULOSKELETAL: POSITIVE  for arthralgias diffuse and back pain low back    OBJECTIVE:                                                    /82  Pulse 61  Temp 97.8  F (36.6  C) (Oral)  Resp 14  Ht 5' 4.5\" (1.638 m)  Wt 162 lb (73.5 kg)  LMP  (LMP Unknown)  SpO2 100%  Breastfeeding? No  BMI 27.38 kg/m2  Body mass index is 27.38 kg/(m^2).  GENERAL APPEARANCE: healthy, alert and no distress  RESP: lungs clear to auscultation - no rales, rhonchi or wheezes  CV: regular rates and rhythm, normal S1 S2, no S3 or S4 and no murmur, click or rub  ABDOMEN: soft, " nontender, without hepatosplenomegaly or masses and bowel sounds normal  MS: extremities normal- no gross deformities noted  NEURO: Normal strength and tone, mentation intact and speech normal    Diagnostic test results:  none      ASSESSMENT/PLAN:                                                        ICD-10-CM    1. Gastroesophageal reflux disease without esophagitis K21.9    2. Vitamin D deficiency disease E55.9    3. Essential hypertension, benign I10    4. Hyperlipidemia LDL goal <160 E78.5    5. At risk for falling Z91.81        [employer] diet form completed.  Copy made to be scanned, original given back to Pt's daughter    Follow up with Provider - 3 mo    Patient Instructions   Continue current diet and treatments      Kevin Slaughter MD  [facility]

## 2018-02-16 NOTE — MR AVS SNAPSHOT
After Visit Summary   2/16/2018    Rody Gonzalez    MRN: [id number]           Patient Information     Date Of Birth          1940        Visit Information        Provider Department      2/16/2018 1:30 PM Kevin Slaughter MD; [facility] [employer] [facility]        Today's Diagnoses     Gastroesophageal reflux disease without esophagitis    -  1    Vitamin D deficiency disease        Essential hypertension, benign        Hyperlipidemia LDL goal <160        At risk for falling          Care Instructions    Continue current diet and treatments          Follow-ups after your visit        Follow-up notes from your care team     Return in about 3 months (around 5/16/2018).      Your next 10 appointments already scheduled     Mar 02, 2018  1:30 PM CST   Office Visit with Osman Yen MD   [facility]    [address]  [place] [address] [phone]           Bring a current list of meds and any records pertaining to this visit. For Physicals, please bring immunization records and any forms needing to be filled out. Please arrive 10 minutes early to complete paperwork.              Who to contact     If you have questions or need follow up information about today's clinic visit or your schedule please contact [facility] directly at [phone].  Normal or non-critical lab and imaging results will be communicated to you by MyChart, letter or phone within 4 business days after the clinic has received the results. If you do not hear from us within 7 days, please contact the clinic through MyChart or phone. If you have a critical or abnormal lab result, we will notify you by phone as soon as possible.  Submit refill requests through [employer] or call your pharmacy and they will forward the refill request to us.  "Please allow 3 business days for your refill to be completed.          Additional Information About Your Visit        MyChart Information     [employer] lets you send messages to your doctor, view your test results, renew your prescriptions, schedule appointments and more. To sign up, go to [email or web address] . Click on \"Log in\" on the left side of the screen, which will take you to the Welcome page. Then click on \"Sign up Now\" on the right side of the page.     You will be asked to enter the access code listed below, as well as some personal information. Please follow the directions to create your username and password.     Your access code is: [email or web address]  Expires: 2018  2:08 PM     Your access code will  in 90 days. If you need help or a new code, please call your [facility] clinic or [phone].        Care EveryWhere ID     This is your Care EveryWhere ID. This could be used by other organizations to access your [facility] medical records  [id number]        Your Vitals Were     Pulse Temperature Respirations Height Last Period Pulse Oximetry    61 97.8  F (36.6  C) (Oral) 14 5' 4.5\" (1.638 m) (LMP Unknown) 100%    Breastfeeding? BMI (Body Mass Index)                No 27.38 kg/m2           Blood Pressure from Last 3 Encounters:   18 140/82   17 148/78   17 128/68    Weight from Last 3 Encounters:   18 162 lb (73.5 kg)   17 162 lb (73.5 kg)   17 159 lb 8 oz (72.3 kg)              Today, you had the following     No orders found for display       Primary Care Provider Office Phone # Fax #    Osman Yen [phone] [address]  [facility] [address]        Equal Access to Services     EDUARDO SEPULVEDA : Jackie Hassan, vee sanabria, qaybta kaeverton murphy. [facility] [phone].    ATENCIÓN: Si habla vladimirañol, tiene a hdez disposición servicios gratuitos de asistencia " lingüística. Rae al [phone].    We comply with applicable federal civil rights laws and [place] laws. We do not discriminate on the basis of race, color, national origin, age, disability, sex, sexual orientation, or gender identity.            Thank you!     Thank you for choosing [facility]  for your care. Our goal is always to provide you with excellent care. Hearing back from our patients is one way we can continue to improve our services. Please take a few minutes to complete the written survey that you may receive in the mail after your visit with us. Thank you!             Your Updated Medication List - Protect others around you: Learn how to safely use, store and throw away your medicines at www.disposemymeds.org.          This list is accurate as of 2/16/18  2:08 PM.  Always use your most recent med list.                   Brand Name Dispense Instructions for use Diagnosis    acetaminophen 325 MG tablet    MAPAP    180 tablet    Take 1-2 tablets (325-650 mg) by mouth every 6 hours as needed for mild pain    Vitamin D insufficiency       alum & mag hydroxide-simethicone 400-400-40 MG/5ML Susp suspension    MYLANTA ES/MAALOX  ES    1 Bottle    Take 30 mLs by mouth 4 times daily as needed for indigestion    Gastroesophageal reflux disease without esophagitis       aspirin 81 MG chewable tablet     108 tablet    Take 1 tablet (81 mg) by mouth daily    Hyperlipidemia LDL goal <160       aspirin-acetaminophen-caffeine 250-250-65 MG per tablet    EXCEDRIN MIGRAINE    80 tablet    Take 1 tablet by mouth every 6 hours as needed for headaches    Tension headache       atorvastatin 10 MG tablet    LIPITOR    30 tablet    Take 1 tablet (10 mg) by mouth daily    Hyperlipidemia LDL goal <160       chlorhexidine 0.12 % solution    PERIDEX    120 mL    Swish and spit 15 mLs in mouth 2 times daily    Gingival bleeding       fluticasone 50 MCG/ACT spray    FLONASE    16 g    Spray 1  spray into both nostrils daily    Chronic seasonal allergic rhinitis due to pollen       glucosamine-chondroitinoitin 500-400 MG Tabs    [facility] GLUCOSAMINE-CHONDROITIN    120 tablet    Take 2 tablets by mouth 2 times daily    Primary osteoarthritis of both knees       hydrocortisone 1 % cream    CORTAID    30 g    Apply sparingly to affected area three times daily for 14 days.    External hemorrhoids       hydrocortisone 2.5 % cream    ANUSOL-HC    30 g    Place rectally 2 times daily    External hemorrhoids       lidocaine 5 % ointment    XYLOCAINE    142 g    One application 4 x daily to affected areas lower back and knees if necessary Profile Rx: patient will contact pharmacy when needed    Age related osteoporosis, unspecified pathological fracture presence       loratadine 10 MG tablet    QC LORATADINE ALLERGY RELIEF    30 tablet    Take 1 tablet (10 mg) by mouth daily as needed    Seasonal allergic rhinitis       metoprolol succinate 25 MG 24 hr tablet    TOPROL-XL    45 tablet    Take 0.5 tablets (12.5 mg) by mouth At Bedtime    Episodic tension-type headache, not intractable, Essential hypertension, benign       omeprazole 20 MG tablet    KLS OMPERAZOLE    30 tablet    Take 1 tablet (20 mg) by mouth daily    Gastroesophageal reflux disease without esophagitis       * order for DME     1 each    Cane of any materials of adujustable priya *one  Use as directed*    Tendency to fall       * order for DME     1 each    One cane of any material  One As directed adjustible if necessary    Primary osteoarthritis of both knees, Tendency to fall       polyethylene glycol powder    MIRALAX    510 g    Take 17 g (1 capful) by mouth daily    Slow transit constipation       Vitamin D3 2000 UNITS Tabs     60 tablet    Take 2 tablets by mouth daily (with breakfast)    Vitamin D insufficiency       * Notice:  This list has 2 medication(s) that are the same as other medications prescribed for you. Read the directions carefully,  and ask your doctor or other care provider to review them with you.

## 2018-02-16 NOTE — NURSING NOTE
"Chief Complaint   Patient presents with     Forms     /73  Pulse 61  Temp 97.8  F (36.6  C) (Oral)  Resp 14  Ht 5' 4.5\" (1.638 m)  Wt 162 lb (73.5 kg)  LMP  (LMP Unknown)  SpO2 100%  Breastfeeding? No  BMI 27.38 kg/m2 Estimated body mass index is 27.38 kg/(m^2) as calculated from the following:    Height as of this encounter: 5' 4.5\" (1.638 m).    Weight as of this encounter: 162 lb (73.5 kg).  BP completed using cuff size: regular   Colleen Marquez CMA    Health Maintenance Due   Topic Date Due     PNEUMOCOCCAL (2 of 2 - PCV13) 09/01/2007     TETANUS IMMUNIZATION (SYSTEM ASSIGNED)  09/01/2016     LIPID MONITORING Q1 YEAR  04/13/2017     ANA LAURA QUESTIONNAIRE 6 MONTHS  07/12/2017     INFLUENZA VACCINE (SYSTEM ASSIGNED)  09/01/2017     FALL RISK ASSESSMENT  09/09/2017     PHQ-9 Q6 MONTHS  03/14/2018     Health Maintenance reviewed at today's visit patient asked to schedule/complete:   Depression:  Patient agrees to schedule  Immunizations:  Patient agrees to schedule    "

## 2018-02-17 ASSESSMENT — PATIENT HEALTH QUESTIONNAIRE - PHQ9: SUM OF ALL RESPONSES TO PHQ QUESTIONS 1-9: 0

## 2018-02-19 NOTE — PROGRESS NOTES
Placed a call to member and discussed upcoming home visit schedule.  HV was then reschedule to Wednesday 02/21/2018.  Johnna Malin RN N  [facility]   Phone:   [phone]  Fax:       [phone]

## 2018-02-21 ENCOUNTER — CARE COORDINATION (OUTPATIENT)
Dept: GERIATRIC MEDICINE | Facility: CLINIC | Age: 78
End: 2018-02-21

## 2018-02-21 ENCOUNTER — TELEPHONE (OUTPATIENT)
Dept: FAMILY MEDICINE | Facility: CLINIC | Age: 78
End: 2018-02-21

## 2018-02-21 DIAGNOSIS — E66.3 OVERWEIGHT (BMI 25.0-29.9): Primary | Chronic | ICD-10-CM

## 2018-02-21 DIAGNOSIS — R63.0 ANOREXIA: ICD-10-CM

## 2018-02-21 DIAGNOSIS — K21.9 GASTROESOPHAGEAL REFLUX DISEASE WITHOUT ESOPHAGITIS: Chronic | ICD-10-CM

## 2018-02-21 RX ORDER — LACTOSE-REDUCED FOOD
LIQUID (ML) ORAL
Qty: 270 EACH | Refills: 3 | Status: CANCELLED | OUTPATIENT
Start: 2018-02-21

## 2018-02-21 NOTE — TELEPHONE ENCOUNTER
Ensure Plus liquid, flavor strawberry three times a day.   Ensure Plus      Last Written Prescription Date:  1/12/17  Last Fill Quantity: 270 bottles,   # refills: 3   Last Office Visit:   Future Office visit:    Next 5 appointments (look out 90 days)     Mar 02, 2018  1:30 PM CST   Office Visit with Osman Yen MD, [facility] [facility]   [facility]    [address] [place] [id number] [phone]                   Routing refill request to provider for review/approval because:  Drug not active on patient's medication list

## 2018-02-21 NOTE — TELEPHONE ENCOUNTER
Chart reviewed, unclear indication for supplement. Patient not underweight and no diagnosis listed on Problem List would support needing Ensure. Will need to wait for PCP review upon his return next week. Non-essential medication, ok to wait.

## 2018-02-21 NOTE — PROGRESS NOTES
[facility] Home Visit Assessment     Home visit for Health Risk Assessment with Rody Gonzalez completed on February 21, 2018  Member resides in [place] and lives alone  Present at assessment: member and this care coordinator    Current Care Plan  Member currently receiving the following services: Adult Day Care, Homemaking and PCA    Medication Review  Medication reconciliation completed in Epic:Yes  Medication set-up & administration: Independent-does not set up.  Self-administers medications.  Medication understanding concerns (by member, family or CC): No  Medication adherence concerns (by member, family or CC): No    Mental/Behavioral Health   Depression Screening: See PHQ assessment flowsheet.   Mental Health Diagnosis: No  No current MH services-will place referral:  N/A    Falls in last 12 months: No    ADL/IADL Dependencies: Ambulation-no assistive device, Bathing, Dressing, Grooming, Transfers, Cooking, Laundry, Shopping, Meal prep, Medication Management and Transportation     [facility] sponsored benefits: Shared information re: Silver Sneakers/gym memberships, ASA, Calcium +D.    PCA Assessment completed at visit: Yes     Elderly Waiver Eligibility: Yes-will continue on EW    Care Plan & Recommendations: This CM will continue current POC including PCA, homemaking, ADC with ADC transportation.    See LTCC for detailed assessment information.    Follow-Up Plan: Member informed of future contact, plan to f/u with member with a 6 month telephone assessment.  Contact information shared with member and family, encouraged member to call with any questions or concerns at any time.  [facility] care continuum providers: Please refer to Health Care Home on the [facility] Problem List to view this patient's [facility] Care Plan Summary.    Johnna Malin RN PHN  [facility]   Phone:   [phone]  Fax:       [phone]

## 2018-02-22 ASSESSMENT — PATIENT HEALTH QUESTIONNAIRE - PHQ9: SUM OF ALL RESPONSES TO PHQ QUESTIONS 1-9: 7

## 2018-02-22 NOTE — TELEPHONE ENCOUNTER
"FYI- pt has OV 03/02/2018.     Johnna was called with providers message below. States pt has multiple chronic GI \"issues\". Symptoms include constipation, GERD and poor appetite. Explained message will be sent to PCP for review 03/01/2018. Pt does have appt scheduled with PCP 03/02/2018. Writer advised  she have pt discuss order for ensure at OV.   "

## 2018-03-02 ENCOUNTER — CARE COORDINATION (OUTPATIENT)
Dept: GERIATRIC MEDICINE | Facility: CLINIC | Age: 78
End: 2018-03-02

## 2018-03-02 NOTE — PROGRESS NOTES
[facility]:  [facility]  Faxed copy of PCA assessment to PCA Agency and mailed copy to member.  Faxed MD Communication to PCP.     Kelly Abebe  Case Management Specialist  [facility]  [phone]

## 2018-03-09 ENCOUNTER — CARE COORDINATION (OUTPATIENT)
Dept: GERIATRIC MEDICINE | Facility: CLINIC | Age: 78
End: 2018-03-09

## 2018-03-09 NOTE — PROGRESS NOTES
Received after visit chart from care coordinator.  Completed following tasks: Mailed copy of care plan to client, Updated services in access, Submitted referrals/auths for ADC and Hmkg and Entered MMIS  Chart was returned to CC.       Kelly Abebe  Case Management Specialist  [facility]  [phone]

## 2018-04-03 ENCOUNTER — OFFICE VISIT (OUTPATIENT)
Dept: FAMILY MEDICINE | Facility: CLINIC | Age: 78
End: 2018-04-03
Payer: COMMERCIAL

## 2018-04-03 VITALS
OXYGEN SATURATION: 97 % | RESPIRATION RATE: 16 BRPM | BODY MASS INDEX: 27.04 KG/M2 | DIASTOLIC BLOOD PRESSURE: 64 MMHG | HEART RATE: 58 BPM | SYSTOLIC BLOOD PRESSURE: 128 MMHG | WEIGHT: 160 LBS | TEMPERATURE: 98.3 F

## 2018-04-03 DIAGNOSIS — J30.2 CHRONIC SEASONAL ALLERGIC RHINITIS, UNSPECIFIED TRIGGER: ICD-10-CM

## 2018-04-03 DIAGNOSIS — G44.219 EPISODIC TENSION-TYPE HEADACHE, NOT INTRACTABLE: ICD-10-CM

## 2018-04-03 DIAGNOSIS — M51.369 DDD (DEGENERATIVE DISC DISEASE), LUMBAR: Chronic | ICD-10-CM

## 2018-04-03 DIAGNOSIS — M81.0 AGE RELATED OSTEOPOROSIS, UNSPECIFIED PATHOLOGICAL FRACTURE PRESENCE: ICD-10-CM

## 2018-04-03 DIAGNOSIS — K59.01 SLOW TRANSIT CONSTIPATION: ICD-10-CM

## 2018-04-03 DIAGNOSIS — I10 ESSENTIAL HYPERTENSION, BENIGN: ICD-10-CM

## 2018-04-03 DIAGNOSIS — E78.5 HYPERLIPIDEMIA LDL GOAL <160: Chronic | ICD-10-CM

## 2018-04-03 DIAGNOSIS — M17.0 PRIMARY OSTEOARTHRITIS OF BOTH KNEES: Chronic | ICD-10-CM

## 2018-04-03 DIAGNOSIS — E66.3 OVERWEIGHT (BMI 25.0-29.9): Chronic | ICD-10-CM

## 2018-04-03 DIAGNOSIS — H04.123 INSUFFICIENCY OF TEAR FILM OF BOTH EYES: ICD-10-CM

## 2018-04-03 DIAGNOSIS — G44.209 TENSION HEADACHE: Primary | Chronic | ICD-10-CM

## 2018-04-03 DIAGNOSIS — R26.9 ABNORMALITY OF GAIT: ICD-10-CM

## 2018-04-03 DIAGNOSIS — K06.8 GINGIVAL BLEEDING: ICD-10-CM

## 2018-04-03 DIAGNOSIS — K21.9 GASTROESOPHAGEAL REFLUX DISEASE WITHOUT ESOPHAGITIS: Chronic | ICD-10-CM

## 2018-04-03 DIAGNOSIS — J30.1 CHRONIC SEASONAL ALLERGIC RHINITIS DUE TO POLLEN: ICD-10-CM

## 2018-04-03 DIAGNOSIS — E55.9 VITAMIN D INSUFFICIENCY: ICD-10-CM

## 2018-04-03 DIAGNOSIS — M81.0 AGE-RELATED OSTEOPOROSIS WITHOUT CURRENT PATHOLOGICAL FRACTURE: ICD-10-CM

## 2018-04-03 PROCEDURE — 99213 OFFICE O/P EST LOW 20 MIN: CPT | Performed by: FAMILY MEDICINE

## 2018-04-03 RX ORDER — CHLORHEXIDINE GLUCONATE ORAL RINSE 1.2 MG/ML
15 SOLUTION DENTAL 2 TIMES DAILY
Qty: 120 ML | Refills: 3 | Status: SHIPPED | OUTPATIENT
Start: 2018-04-03 | End: 2019-02-13

## 2018-04-03 RX ORDER — AMOXICILLIN 250 MG
1-2 CAPSULE ORAL 2 TIMES DAILY
Qty: 120 TABLET | Refills: 1 | Status: SHIPPED | OUTPATIENT
Start: 2018-04-03 | End: 2018-10-05

## 2018-04-03 RX ORDER — ATORVASTATIN CALCIUM 10 MG/1
10 TABLET, FILM COATED ORAL DAILY
Qty: 30 TABLET | Refills: 11 | Status: SHIPPED | OUTPATIENT
Start: 2018-04-03 | End: 2018-10-05

## 2018-04-03 RX ORDER — FLUTICASONE PROPIONATE 50 MCG
1 SPRAY, SUSPENSION (ML) NASAL DAILY
Qty: 16 G | Refills: 11 | Status: SHIPPED | OUTPATIENT
Start: 2018-04-03 | End: 2018-10-05

## 2018-04-03 RX ORDER — POLYETHYLENE GLYCOL 3350 17 G/17G
1 POWDER, FOR SOLUTION ORAL DAILY
Qty: 510 G | Refills: 11 | Status: SHIPPED | OUTPATIENT
Start: 2018-04-03 | End: 2018-10-05

## 2018-04-03 RX ORDER — RIBOFLAVIN (VITAMIN B2) 100 MG
2 TABLET ORAL 2 TIMES DAILY
Qty: 120 TABLET | Refills: 11 | Status: SHIPPED | OUTPATIENT
Start: 2018-04-03 | End: 2018-10-05

## 2018-04-03 RX ORDER — ALUMINA, MAGNESIA, AND SIMETHICONE 2400; 2400; 240 MG/30ML; MG/30ML; MG/30ML
30 SUSPENSION ORAL 4 TIMES DAILY PRN
Qty: 1 BOTTLE | Refills: 11 | Status: SHIPPED | OUTPATIENT
Start: 2018-04-03 | End: 2019-04-18

## 2018-04-03 RX ORDER — LORATADINE 10 MG/1
10 TABLET ORAL DAILY PRN
Qty: 30 TABLET | Refills: 11 | Status: SHIPPED | OUTPATIENT
Start: 2018-04-03 | End: 2018-10-05

## 2018-04-03 RX ORDER — LIDOCAINE 50 MG/G
OINTMENT TOPICAL
Qty: 142 G | Refills: 11 | Status: SHIPPED | OUTPATIENT
Start: 2018-04-03 | End: 2018-05-11

## 2018-04-03 RX ORDER — METOPROLOL SUCCINATE 25 MG/1
12.5 TABLET, EXTENDED RELEASE ORAL AT BEDTIME
Qty: 45 TABLET | Refills: 3 | Status: SHIPPED | OUTPATIENT
Start: 2018-04-03 | End: 2019-02-13

## 2018-04-03 RX ORDER — POLYETHYLENE GLYCOL 3350 17 G/17G
1 POWDER, FOR SOLUTION ORAL DAILY
Qty: 510 G | Refills: 1 | Status: SHIPPED | OUTPATIENT
Start: 2018-04-03 | End: 2018-04-20

## 2018-04-03 RX ORDER — CHOLECALCIFEROL (VITAMIN D3) 50 MCG
2 TABLET ORAL
Qty: 60 TABLET | Refills: 11 | Status: SHIPPED | OUTPATIENT
Start: 2018-04-03 | End: 2018-10-05

## 2018-04-03 RX ORDER — ACETAMINOPHEN 325 MG/1
325-650 TABLET ORAL EVERY 6 HOURS PRN
Qty: 180 TABLET | Refills: 11 | Status: SHIPPED | OUTPATIENT
Start: 2018-04-03 | End: 2018-05-11

## 2018-04-03 RX ORDER — NICOTINE POLACRILEX 4 MG/1
20 GUM, CHEWING ORAL DAILY
Qty: 30 TABLET | Refills: 11 | Status: SHIPPED | OUTPATIENT
Start: 2018-04-03 | End: 2019-02-13

## 2018-04-03 ASSESSMENT — ANXIETY QUESTIONNAIRES
6. BECOMING EASILY ANNOYED OR IRRITABLE: NOT AT ALL
7. FEELING AFRAID AS IF SOMETHING AWFUL MIGHT HAPPEN: NOT AT ALL
7. FEELING AFRAID AS IF SOMETHING AWFUL MIGHT HAPPEN: NOT AT ALL
GAD7 TOTAL SCORE: 0
5. BEING SO RESTLESS THAT IT IS HARD TO SIT STILL: NOT AT ALL
3. WORRYING TOO MUCH ABOUT DIFFERENT THINGS: NOT AT ALL
GAD7 TOTAL SCORE: 0
1. FEELING NERVOUS, ANXIOUS, OR ON EDGE: NOT AT ALL
2. NOT BEING ABLE TO STOP OR CONTROL WORRYING: NOT AT ALL
GAD7 TOTAL SCORE: 0
4. TROUBLE RELAXING: NOT AT ALL

## 2018-04-03 NOTE — PROGRESS NOTES
"  SUBJECTIVE:   Rody Gonzalez is a 78 year old female who presents to clinic today for the following health issues:      Constipation      Duration: ONGOING    Description:       Frequency of bowel movements: 2-3 days       Consistency of stool: HARD, SMALL    Intensity:  moderate    Accompanying signs and symptoms: UNCOMFORTABLE       Abdominal pain: YES- MILD       Rectal pain: no        Blood in stool: YES, FISSURE IN ANUS       Nausea/vomitting: no     History:        Similar problems in past: YES    Precipitating or alleviating factors: NONE       Medications worsening symptoms: no     Therapies tried and outcome: MIRALAX, MYLANTA. NOT HELPING       Chronic laxative use: YES   GASTROESOPHAGEAL REFLUX DISEASE WITHOUT ESOPHAGITIS   LUMBAR DISC DISEASE   OSTEOARTHRITIS KNEES   INTERMITTENT DYSURIA ASYMPTOMATIC AT PRESENT   ANXIETY INTERMITTENT  HISTORY OF TB PARENTERAL NODES   HISTORY OF ATYPICAL SQUAMOUS CELLS OF UNDETERMINED SIGNIFICANCE   HISTORY OF DRY EYES   MILD INTERMITTENT HEADACHES   HISTORY OF ABDOMINAL PAIN   HISTORY OF WRIST FRACTURE   ABNORMAL GAIT   BILATERAL HEARING LOSS UNILATERAL   VITAMIN D REPLACEMENT    LDL OR \"BAD\" CHOLESTEROL  GOAL < 100 ON MEDITERRANEAN DIET   OVER WEIGHT   INTERMITTENT TENSION HEADACHES  HISTORY OF HEMORRHOIDS   HYPERTENSION WITH GOAL OF LESS THAN 140/80                 Topic Date Due     TETANUS IMMUNIZATION (SYSTEM ASSIGNED)  09/01/2016     LIPID MONITORING Q1 YEAR  04/13/2017               .  Current Outpatient Prescriptions   Medication Sig Dispense Refill     polyethylene glycol (MIRALAX) powder Take 17 g (1 capful) by mouth daily 510 g 11     senna-docusate (SENOKOT-S;PERICOLACE) 8.6-50 MG per tablet Take 1-2 tablets by mouth 2 times daily 120 tablet 1     order for DME One cane of any material   One  As directed  adjustible if necessary 1 each 0     Cholecalciferol (VITAMIN D3) 2000 UNITS TABS Take 2 tablets by mouth daily (with breakfast) 60 tablet 11     " omeprazole (KLS OMPERAZOLE) 20 MG tablet Take 1 tablet (20 mg) by mouth daily 30 tablet 11     atorvastatin (LIPITOR) 10 MG tablet Take 1 tablet (10 mg) by mouth daily 30 tablet 11     alum & mag hydroxide-simethicone (MYLANTA ES/MAALOX  ES) 400-400-40 MG/5ML SUSP suspension Take 30 mLs by mouth 4 times daily as needed for indigestion 1 Bottle 11     order for DME Cane of any materials of adujustable priya  *one   Use as directed* 1 each 0     loratadine (QC LORATADINE ALLERGY RELIEF) 10 MG tablet Take 1 tablet (10 mg) by mouth daily as needed 30 tablet 11     metoprolol (TOPROL-XL) 25 MG 24 hr tablet Take 0.5 tablets (12.5 mg) by mouth At Bedtime (Patient not taking: Reported on 4/3/2018) 45 tablet 3     chlorhexidine (CHLORHEXIDINE) 0.12 % solution Swish and spit 15 mLs in mouth 2 times daily (Patient not taking: Reported on 2/21/2018) 120 mL 3     acetaminophen (MAPAP) 325 MG tablet Take 1-2 tablets (325-650 mg) by mouth every 6 hours as needed for mild pain (Patient not taking: Reported on 4/3/2018) 180 tablet 11     aspirin-acetaminophen-caffeine (EXCEDRIN MIGRAINE) 250-250-65 MG per tablet Take 1 tablet by mouth every 6 hours as needed for headaches (Patient not taking: Reported on 2/21/2018) 80 tablet 11     lidocaine (XYLOCAINE) 5 % ointment One application 4 x daily to affected areas lower back and knees if necessary  Profile Rx: patient will contact pharmacy when needed (Patient not taking: Reported on 2/21/2018) 142 g 11     fluticasone (FLONASE) 50 MCG/ACT spray Spray 1 spray into both nostrils daily (Patient not taking: Reported on 2/21/2018) 16 g 11     hydrocortisone (ANUSOL-HC) 2.5 % cream Place rectally 2 times daily (Patient not taking: Reported on 2/21/2018) 30 g 11     hydrocortisone (CORTAID) 1 % cream Apply sparingly to affected area three times daily for 14 days. (Patient not taking: Reported on 2/21/2018) 30 g 5     aspirin 81 MG chewable tablet Take 1 tablet (81 mg) by mouth daily (Patient  not taking: Reported on 2018) 108 tablet 3     glucosamine-chondroitinoitin ([facility] GLUCOSAMINE-CHONDROITIN) 500-400 MG TABS Take 2 tablets by mouth 2 times daily (Patient not taking: Reported on 2018) 120 tablet 11              Allergies   Allergen Reactions     Isoniazid      Pyrazinamide            Immunization History   Administered Date(s) Administered     Pneumococcal 23 valent 2006     Tdap (Adacel,Boostrix) 2006               reports that she does not drink alcohol.          reports that she does not use illicit drugs.        family history includes Family History Negative in her father and mother. There is no history of DIABETES, Coronary Artery Disease, Hypertension, Hyperlipidemia, CEREBROVASCULAR DISEASE, Breast Cancer, Colon Cancer, Prostate Cancer, Other Cancer, Depression, Anxiety Disorder, MENTAL ILLNESS, Substance Abuse, Anesthesia Reaction, Asthma, OSTEOPOROSIS, Genetic Disorder, Thyroid Disease, Obesity, or Unknown/Adopted.        indicated that the status of her no family hx of is unknown. She indicated that her mother is . She indicated that her father is .          has a past surgical history that includes Hysterectomy ().         reports that she does not currently engage in sexual activity but has had male partners.;    .  Pediatric History   Patient Guardian Status     Not on file.     Other Topics Concern     Parent/Sibling W/ Cabg, Mi Or Angioplasty Before 65f 55m? No     Social History Narrative               reports that she has never smoked. She has never used smokeless tobacco.        Medical, social, surgical, and family histories reviewed.        Labs reviewed in EPIC  Patient Active Problem List   Diagnosis     Health Care Home     Gastroesophageal reflux disease without esophagitis     DDD (degenerative disc disease), lumbar     Dysuria     Anxiety state     Urinary frequency     Tuberculosis of peripheral lymph nodes     Nonspecific abnormal  results of thyroid function study     Pulmonary tuberculosis     PPD positive     Papanicolaou smear of cervix with atypical squamous cells of undetermined significance (ASC-US)     Osteoporosis     Tear film insufficiency     Memory loss     Headache     Abdominal pain     Closed fracture of triquetral (cuneiform) bone of wrist     Other chronic pain     Abnormality of gait     Hearing loss     Malaise and fatigue     Acute cystitis     Major depression in complete remission (H)     Sensory hearing loss, unilateral     Vitamin D deficiency disease     Hyperlipidemia LDL goal <160     Overweight (BMI 25.0-29.9)     Risk for falls     ACP (advance care planning)     Primary osteoarthritis of both knees     Tension headache     History of bloody stools     Essential hypertension, benign       Past Surgical History:   Procedure Laterality Date     HYSTERECTOMY  2004         Social History   Substance Use Topics     Smoking status: Never Smoker     Smokeless tobacco: Never Used     Alcohol use No       Family History   Problem Relation Age of Onset     Family History Negative Mother      Family History Negative Father      DIABETES No family hx of      Coronary Artery Disease No family hx of      Hypertension No family hx of      Hyperlipidemia No family hx of      CEREBROVASCULAR DISEASE No family hx of      Breast Cancer No family hx of      Colon Cancer No family hx of      Prostate Cancer No family hx of      Other Cancer No family hx of      Depression No family hx of      Anxiety Disorder No family hx of      MENTAL ILLNESS No family hx of      Substance Abuse No family hx of      Anesthesia Reaction No family hx of      Asthma No family hx of      OSTEOPOROSIS No family hx of      Genetic Disorder No family hx of      Thyroid Disease No family hx of      Obesity No family hx of      Unknown/Adopted No family hx of              Current Outpatient Prescriptions   Medication Sig Dispense Refill     polyethylene  glycol (MIRALAX) powder Take 17 g (1 capful) by mouth daily 510 g 11     senna-docusate (SENOKOT-S;PERICOLACE) 8.6-50 MG per tablet Take 1-2 tablets by mouth 2 times daily 120 tablet 1     order for DME One cane of any material   One  As directed  adjustible if necessary 1 each 0     Cholecalciferol (VITAMIN D3) 2000 UNITS TABS Take 2 tablets by mouth daily (with breakfast) 60 tablet 11     omeprazole (KLS OMPERAZOLE) 20 MG tablet Take 1 tablet (20 mg) by mouth daily 30 tablet 11     atorvastatin (LIPITOR) 10 MG tablet Take 1 tablet (10 mg) by mouth daily 30 tablet 11     alum & mag hydroxide-simethicone (MYLANTA ES/MAALOX  ES) 400-400-40 MG/5ML SUSP suspension Take 30 mLs by mouth 4 times daily as needed for indigestion 1 Bottle 11     order for DME Cane of any materials of adujustable priya  *one   Use as directed* 1 each 0     loratadine (QC LORATADINE ALLERGY RELIEF) 10 MG tablet Take 1 tablet (10 mg) by mouth daily as needed 30 tablet 11     metoprolol (TOPROL-XL) 25 MG 24 hr tablet Take 0.5 tablets (12.5 mg) by mouth At Bedtime (Patient not taking: Reported on 4/3/2018) 45 tablet 3     chlorhexidine (CHLORHEXIDINE) 0.12 % solution Swish and spit 15 mLs in mouth 2 times daily (Patient not taking: Reported on 2/21/2018) 120 mL 3     acetaminophen (MAPAP) 325 MG tablet Take 1-2 tablets (325-650 mg) by mouth every 6 hours as needed for mild pain (Patient not taking: Reported on 4/3/2018) 180 tablet 11     aspirin-acetaminophen-caffeine (EXCEDRIN MIGRAINE) 250-250-65 MG per tablet Take 1 tablet by mouth every 6 hours as needed for headaches (Patient not taking: Reported on 2/21/2018) 80 tablet 11     lidocaine (XYLOCAINE) 5 % ointment One application 4 x daily to affected areas lower back and knees if necessary  Profile Rx: patient will contact pharmacy when needed (Patient not taking: Reported on 2/21/2018) 142 g 11     fluticasone (FLONASE) 50 MCG/ACT spray Spray 1 spray into both nostrils daily (Patient not  taking: Reported on 2/21/2018) 16 g 11     hydrocortisone (ANUSOL-HC) 2.5 % cream Place rectally 2 times daily (Patient not taking: Reported on 2/21/2018) 30 g 11     hydrocortisone (CORTAID) 1 % cream Apply sparingly to affected area three times daily for 14 days. (Patient not taking: Reported on 2/21/2018) 30 g 5     aspirin 81 MG chewable tablet Take 1 tablet (81 mg) by mouth daily (Patient not taking: Reported on 2/21/2018) 108 tablet 3     glucosamine-chondroitinoitin ([facility] GLUCOSAMINE-CHONDROITIN) 500-400 MG TABS Take 2 tablets by mouth 2 times daily (Patient not taking: Reported on 2/21/2018) 120 tablet 11           Recent Labs   Lab Test  07/18/17   1605  01/12/17   1510  04/13/16   0943  12/04/15   1530  04/10/15   1257  03/04/14   1050  09/13/13   1125   LDL   --    --   126*  125*  116  134*  120   HDL   --    --   41*  53  42*  42*  41*   TRIG   --    --   131  100  77  84  99   ALT  25   --    --   30  44  22   --    CR  0.67  0.63  0.70   --   0.80  0.70  0.70   GFRESTIMATED  85  >90  Non  GFR Calc    81   --   70  82  82   GFRESTBLACK  >90   GFR Calc    >90   GFR Calc    >90   --   85  >90  >90   POTASSIUM  4.0  4.2  4.0   --   4.7  4.5  4.4   TSH   --    --    --    --    --   1.47  1.86            BP Readings from Last 6 Encounters:   04/03/18 128/64   02/16/18 140/82   09/28/17 148/78   09/14/17 128/68   07/18/17 128/64   06/15/17 118/64           Wt Readings from Last 3 Encounters:   04/03/18 160 lb (72.6 kg)   02/16/18 162 lb (73.5 kg)   09/28/17 162 lb (73.5 kg)                 Positive symptoms or findings indicated by bold designation:         ROS: 10 point ROS neg other than the symptoms noted above in the HPI.except  has Health Care Home; Gastroesophageal reflux disease without esophagitis; DDD (degenerative disc disease), lumbar; Dysuria; Anxiety state; Urinary frequency; Tuberculosis of peripheral lymph nodes; Nonspecific abnormal results of  thyroid function study; Pulmonary tuberculosis; PPD positive; Papanicolaou smear of cervix with atypical squamous cells of undetermined significance (ASC-US); Osteoporosis; Tear film insufficiency; Memory loss; Headache; Abdominal pain; Closed fracture of triquetral (cuneiform) bone of wrist; Other chronic pain; Abnormality of gait; Hearing loss; Malaise and fatigue; Acute cystitis; Major depression in complete remission (H); Sensory hearing loss, unilateral; Vitamin D deficiency disease; Hyperlipidemia LDL goal <160; Overweight (BMI 25.0-29.9); Risk for falls; ACP (advance care planning); Primary osteoarthritis of both knees; Tension headache; History of bloody stools; and Essential hypertension, benign on her problem list.  Review Of Systems    Skin: negative    Eyes: negative    Ears/Nose/Throat: negative    Respiratory: No shortness of breath, dyspnea on exertion, cough, or hemoptysis    Cardiovascular: negative    Gastrointestinal: constipation    Genitourinary: negative    Musculoskeletal: back pain, arthritis, joint pain and joint stiffness    Neurologic: negative    Psychiatric: negative    Hematologic/Lymphatic/Immunologic: negative    Endocrine: negative                PE:  /64 (Cuff Size: Adult Regular)  Pulse 58  Temp 98.3  F (36.8  C) (Tympanic)  Resp 16  Wt 160 lb (72.6 kg)  LMP  (LMP Unknown)  SpO2 97%  BMI 27.04 kg/m2 Body mass index is 27.04 kg/(m^2).        Constitutional: general appearance, well nourished, well developed, in no acute distress, well developed, appears stated age, normal body habitus,          Eyes:; The patient has normal eyelids sclerae and conjunctivae :          Ears/Nose/Throat: external ear, overall: normal appearance; external nose, overall: benign appearance, normal moujth gums and lips           Neck: thyroid, overall: normal size, normal consistency, nontender,          Respiratory:  palpation of chest, overall: normal excursion,    Clear to percussion and  auscultation     NO Tachypnea    NORMAL  Color          Cardiovascular:  Good color with no peripheral edema    Regular sinus rhythm without murmur.  Physiologic heart sounds   Heart is unelarged    .     Chest/Breast: normal shape           Abdominal exam,  Liver and spleen are  unenlarged        Tenderness  DIFFUSE   Scars  NOT APPLICABLE             Urogenital; no renal, flank or bladder  tenderness;          Lymphatic: neck nodes,     Other nodes         Musculoskeletal:  Brief ortho exam normal except:           Integument: inspection of skin, no rash, lesions; and, palpation, no induration, no tenderness.          Neurologic mental status, overall: alert and oriented; gait, no ataxia, no unsteadiness; coordination, no tremors; cranial nerves, overall: normal motor, overall: normal bulk, tone.          Psychiatric: orientation/consciousness, overall: oriented to person, place and time; behavior/psychomotor activity, no tics, normal psychomotor activity; mood and affect, overall: normal mood and affect; appearance, overall: well-groomed, good eye contact; speech, overall: normal quality, no aphasia and normal quality, quantity, intact.        Diagnostic Test Results:  Results for orders placed or performed during the hospital encounter of 10/03/17   US Breast Left    Narrative    DIAGNOSTIC MAMMOGRAM BILATERAL DIGITAL w/CAD w/TOMOSYNTHESIS  ULTRASOUND LEFT  BREAST  10/3/2017    HISTORY: Intermittent pain in the medial left breast for several  months.    COMPARISON:  None.    BREAST DENSITY: Almost entirely fat    FINDINGS:  No suspicious findings on mammography. Ultrasound at 10:00  in the region of pain is normal.     Any further evaluation should be based on clinical findings and  clinical suspicion.      Impression    IMPRESSION: BI-RADS CATEGORY: 1 -  NEGATIVE    RECOMMENDED FOLLOW-UP: Annual Mammography    SUZE ELLIS MD           ICD-10-CM    1. Tension headache G44.209    2. Slow transit constipation  K59.01 polyethylene glycol (MIRALAX) powder     senna-docusate (SENOKOT-S;PERICOLACE) 8.6-50 MG per tablet     GASTROENTEROLOGY ADULT REF PROCEDURE ONLY [facility] [phone]     CANCELED: GASTROENTEROLOGY ADULT REF PROCEDURE ONLY [facility] [phone]   3. Gastroesophageal reflux disease without esophagitis K21.9    4. Hyperlipidemia LDL goal <160 E78.5    5. Primary osteoarthritis of both knees M17.0    6. DDD (degenerative disc disease), lumbar M51.36    7. Overweight (BMI 25.0-29.9) E66.3               .    Side effects benefits and risks thoroughly discussed. .she may come in early if unimproved or getting worse          Please drink 2 glasses of water prior to meals and walk 15-30 minutes after meals        I spent 25 MINUTES SPENT  with patient discussing the following issues   The primary encounter diagnosis was Tension headache. Diagnoses of Slow transit constipation, Gastroesophageal reflux disease without esophagitis, Hyperlipidemia LDL goal <160, Primary osteoarthritis of both knees, DDD (degenerative disc disease), lumbar, and Overweight (BMI 25.0-29.9) were also pertinent to this visit. over half of which involved counseling and coordination of care.        There are no Patient Instructions on file for this visit.        ALL THE ABOVE PROBLEMS ARE STABLE AND MED CHANGES AS NOTED        Diet:  MEDITERRANEAN DIET AND DIABETES         Exercise:  UPPER EXTREMETIES AND LOWER BACK PAIN   NECK PAIN WITH TENSION HEADACHES     GASTROESOPHAGEAL REFLUX DISEASE WITHOUT ESOPHAGITIS     G  Exercises Range of motion, balance, isometric, and strengthening exercises 30 repetitions twice daily of involved joints            .GAYLA VEGA MD 4/3/2018 3:19 PM  April 3, 2018

## 2018-04-03 NOTE — NURSING NOTE
"Chief Complaint   Patient presents with     Constipation       Initial /64 (Cuff Size: Adult Regular)  Pulse 58  Temp 98.3  F (36.8  C) (Tympanic)  Resp 16  Wt 160 lb (72.6 kg)  LMP  (LMP Unknown)  SpO2 97%  BMI 27.04 kg/m2 Estimated body mass index is 27.04 kg/(m^2) as calculated from the following:    Height as of 2/16/18: 5' 4.5\" (1.638 m).    Weight as of this encounter: 160 lb (72.6 kg).  Medication Reconciliation: complete   Janet Lunsford CMA    "

## 2018-04-03 NOTE — PATIENT INSTRUCTIONS
(G44.209) Tension headache  (primary encounter diagnosis)  Comment:    Plan: aspirin-acetaminophen-caffeine (EXCEDRIN         MIGRAINE) 250-250-65 MG per tablet             (K59.01) Slow transit constipation  Comment:    Plan: polyethylene glycol (MIRALAX) powder,         senna-docusate (SENOKOT-S;PERICOLACE) 8.6-50 MG        per tablet, GASTROENTEROLOGY ADULT REF         PROCEDURE ONLY [facility] [phone], polyethylene glycol (MIRALAX) powder,        CANCELED: GASTROENTEROLOGY ADULT REF PROCEDURE         ONLY [facility] [phone]             (K21.9) Gastroesophageal reflux disease without esophagitis  Comment:    Plan: omeprazole (KLS OMPERAZOLE) 20 MG tablet, alum         & mag hydroxide-simethicone (MYLANTA ES/MAALOX         ES) 400-400-40 MG/5ML SUSP suspension             (E78.5) Hyperlipidemia LDL goal <160  Comment:    Plan: atorvastatin (LIPITOR) 10 MG tablet             (M17.0) Primary osteoarthritis of both knees  Comment:    Plan: glucosamine-chondroitinoitin ([facility]         GLUCOSAMINE-CHONDROITIN) 500-400 MG TABS             (M51.36) DDD (degenerative disc disease), lumbar  Comment:    Plan:      (E66.3) Overweight (BMI 25.0-29.9)  Comment:    Plan:      (M81.0) Age-related osteoporosis without current pathological fracture  Comment:    Plan:      (R26.9) Abnormality of gait  Comment: M   Plan:      (H04.123) Insufficiency of tear film of both eyes  Comment:    Plan:      (E55.9) Vitamin D insufficiency  Comment:    Plan: Cholecalciferol (VITAMIN D3) 2000 UNITS TABS,         acetaminophen (MAPAP) 325 MG tablet             (J30.2) Chronic seasonal allergic rhinitis, unspecified trigger  Comment:    Plan: loratadine (QC LORATADINE ALLERGY RELIEF) 10 MG        tablet             (G44.219) Episodic tension-type headache, not intractable  Comment:    Plan: metoprolol succinate (TOPROL-XL) 25 MG 24 hr         tablet             (I10) Essential hypertension, benign  Comment:     Plan: metoprolol succinate (TOPROL-XL) 25 MG 24 hr         tablet             (K06.8) Gingival bleeding  Comment:    Plan: chlorhexidine (CHLORHEXIDINE) 0.12 % solution             (M81.0) Age related osteoporosis, unspecified pathological fracture presence  Comment:    Plan: lidocaine (XYLOCAINE) 5 % ointment             (J30.1) Chronic seasonal allergic rhinitis due to pollen  Comment:    Plan: fluticasone (FLONASE) 50 MCG/ACT spray

## 2018-04-03 NOTE — MR AVS SNAPSHOT
After Visit Summary   4/3/2018    Rody Gonzalez    MRN: [id number]           Patient Information     Date Of Birth          1940        Visit Information        Provider Department      4/3/2018 2:45 PM Osman Yen MD; [employer] [facility]        Today's Diagnoses     Tension headache    -  1    Slow transit constipation        Gastroesophageal reflux disease without esophagitis        Hyperlipidemia LDL goal <160        Primary osteoarthritis of both knees        DDD (degenerative disc disease), lumbar        Overweight (BMI 25.0-29.9)        Age-related osteoporosis without current pathological fracture        Abnormality of gait        Insufficiency of tear film of both eyes        Vitamin D insufficiency        Chronic seasonal allergic rhinitis, unspecified trigger        Episodic tension-type headache, not intractable        Essential hypertension, benign        Gingival bleeding        Age related osteoporosis, unspecified pathological fracture presence        Chronic seasonal allergic rhinitis due to pollen          Care Instructions    (G44.209) Tension headache  (primary encounter diagnosis)  Comment:    Plan: aspirin-acetaminophen-caffeine (EXCEDRIN         MIGRAINE) 250-250-65 MG per tablet             (K59.01) Slow transit constipation  Comment:    Plan: polyethylene glycol (MIRALAX) powder,         senna-docusate (SENOKOT-S;PERICOLACE) 8.6-50 MG        per tablet, GASTROENTEROLOGY ADULT REF         PROCEDURE ONLY [facility] [phone], polyethylene glycol (MIRALAX) powder,        CANCELED: GASTROENTEROLOGY ADULT REF PROCEDURE         ONLY [facility] [phone]             (K21.9) Gastroesophageal reflux disease without esophagitis  Comment:    Plan: omeprazole (KLS OMPERAZOLE) 20 MG tablet, alum         & mag hydroxide-simethicone (MYLANTA ES/MAALOX         ES) 400-400-40 MG/5ML SUSP  suspension             (E78.5) Hyperlipidemia LDL goal <160  Comment:    Plan: atorvastatin (LIPITOR) 10 MG tablet             (M17.0) Primary osteoarthritis of both knees  Comment:    Plan: glucosamine-chondroitinoitin ([facility]S         GLUCOSAMINE-CHONDROITIN) 500-400 MG TABS             (M51.36) DDD (degenerative disc disease), lumbar  Comment:    Plan:      (E66.3) Overweight (BMI 25.0-29.9)  Comment:    Plan:      (M81.0) Age-related osteoporosis without current pathological fracture  Comment:    Plan:      (R26.9) Abnormality of gait  Comment: M   Plan:      (H04.123) Insufficiency of tear film of both eyes  Comment:    Plan:      (E55.9) Vitamin D insufficiency  Comment:    Plan: Cholecalciferol (VITAMIN D3) 2000 UNITS TABS,         acetaminophen (MAPAP) 325 MG tablet             (J30.2) Chronic seasonal allergic rhinitis, unspecified trigger  Comment:    Plan: loratadine (QC LORATADINE ALLERGY RELIEF) 10 MG        tablet             (G44.219) Episodic tension-type headache, not intractable  Comment:    Plan: metoprolol succinate (TOPROL-XL) 25 MG 24 hr         tablet             (I10) Essential hypertension, benign  Comment:    Plan: metoprolol succinate (TOPROL-XL) 25 MG 24 hr         tablet             (K06.8) Gingival bleeding  Comment:    Plan: chlorhexidine (CHLORHEXIDINE) 0.12 % solution             (M81.0) Age related osteoporosis, unspecified pathological fracture presence  Comment:    Plan: lidocaine (XYLOCAINE) 5 % ointment             (J30.1) Chronic seasonal allergic rhinitis due to pollen  Comment:    Plan: fluticasone (FLONASE) 50 MCG/ACT spray                           Follow-ups after your visit        Additional Services     GASTROENTEROLOGY ADULT REF PROCEDURE ONLY [facility] [phone]       Last Lab Result: Creatinine (mg/dL)       Date                     Value                 07/18/2017               0.67             ----------  Body mass index is 27.04 kg/(m^2).      "Needed:  Yes  Language:  Macanese    Patient will be contacted to schedule procedure.     Please be aware that coverage of these services is subject to the terms and limitations of your health insurance plan.  Call member services at your health plan with any benefit or coverage questions.  Any procedures must be performed at a [facility] facility OR coordinated by your clinic's referral office.    Please bring the following with you to your appointment:    (1) Any X-Rays, CTs or MRIs which have been performed.  Contact the facility where they were done to arrange for  prior to your scheduled appointment.    (2) List of current medications   (3) This referral request   (4) Any documents/labs given to you for this referral                  Who to contact     If you have questions or need follow up information about today's clinic visit or your schedule please contact [facility] directly at [phone].  Normal or non-critical lab and imaging results will be communicated to you by MyChart, letter or phone within 4 business days after the clinic has received the results. If you do not hear from us within 7 days, please contact the clinic through MyChart or phone. If you have a critical or abnormal lab result, we will notify you by phone as soon as possible.  Submit refill requests through [employer] or call your pharmacy and they will forward the refill request to us. Please allow 3 business days for your refill to be completed.          Additional Information About Your Visit        [employer] Information     [employer] lets you send messages to your doctor, view your test results, renew your prescriptions, schedule appointments and more. To sign up, go to [email or web address] . Click on \"Log in\" on the left side of the screen, which will take you to the Welcome page. Then click on \"Sign up Now\" on the right side of the page.     You will be asked to enter the access code listed " below, as well as some personal information. Please follow the directions to create your username and password.     Your access code is: [email or web address]  Expires: 2018  3:08 PM     Your access code will  in 90 days. If you need help or a new code, please call your [place] clinic or [phone].        Care EveryWhere ID     This is your Care EveryWhere ID. This could be used by other organizations to access your [place] medical records  [id number]        Your Vitals Were     Pulse Temperature Respirations Last Period Pulse Oximetry BMI (Body Mass Index)    58 98.3  F (36.8  C) (Tympanic) 16 (LMP Unknown) 97% 27.04 kg/m2       Blood Pressure from Last 3 Encounters:   18 128/64   18 140/82   17 148/78    Weight from Last 3 Encounters:   18 160 lb (72.6 kg)   18 162 lb (73.5 kg)   17 162 lb (73.5 kg)              We Performed the Following     GASTROENTEROLOGY ADULT REF PROCEDURE ONLY [facility] [phone]          Today's Medication Changes          These changes are accurate as of 4/3/18  3:23 PM.  If you have any questions, ask your nurse or doctor.               Start taking these medicines.        Dose/Directions    * polyethylene glycol powder   Commonly known as:  MIRALAX   Used for:  Slow transit constipation   Started by:  Osman Yen MD        Dose:  1 capful   Take 17 g (1 capful) by mouth daily   Quantity:  510 g   Refills:  11       * polyethylene glycol powder   Commonly known as:  MIRALAX   Used for:  Slow transit constipation   Started by:  Osman Yen MD        Dose:  1 capful   Take 17 g (1 capful) by mouth daily   Quantity:  510 g   Refills:  1       senna-docusate 8.6-50 MG per tablet   Commonly known as:  SENOKOT-S;PERICOLACE   Used for:  Slow transit constipation   Started by:  Osman Yen MD        Dose:  1-2 tablet   Take 1-2 tablets by mouth 2 times daily   Quantity:  120 tablet   Refills:  1        * Notice:  This list has 2 medication(s) that are the same as other medications prescribed for you. Read the directions carefully, and ask your doctor or other care provider to review them with you.         Where to get your medicines      These medications were sent to [id number] - [place], [place] - [address] [place] [address] AT [address] [facility] [address]  [address] [facility] [address], [facility] [address]     Phone:  [phone]     acetaminophen 325 MG tablet    alum & mag hydroxide-simethicone 400-400-40 MG/5ML Susp suspension    aspirin-acetaminophen-caffeine 250-250-65 MG per tablet    atorvastatin 10 MG tablet    chlorhexidine 0.12 % solution    fluticasone 50 MCG/ACT spray    glucosamine-chondroitinoitin 500-400 MG Tabs    loratadine 10 MG tablet    metoprolol succinate 25 MG 24 hr tablet    omeprazole 20 MG tablet    polyethylene glycol powder    polyethylene glycol powder    senna-docusate 8.6-50 MG per tablet    Vitamin D3 2000 UNITS Tabs         Some of these will need a paper prescription and others can be bought over the counter.  Ask your nurse if you have questions.     Bring a paper prescription for each of these medications     lidocaine 5 % ointment                Primary Care Provider Office Phone # Fax #    Osman Myranda Yen [phone] [address]  [facility] [address]        Equal Access to Services     JAYANT SEPULVEDA AH: Hadii jenise headley hadasho Soursulaali, waaxda luqadaha, qaybta kaalmada adeegyada, everton rivera. So [facility] [phone].    ATENCIÓN: Si habla español, tiene a hdez disposición servicios gratuitos de asistencia lingüística. Llame al [phone].    We comply with applicable federal civil rights laws and [place] laws. We do not discriminate on the basis of race, color, national origin, age, disability, sex, sexual orientation, or gender identity.            Thank you!     Thank you for choosing [facility]  for your care. Our goal is always to provide you with excellent care. Hearing back from our patients is one way we can continue to improve our services. Please take a few minutes to complete the written survey that you may receive in the mail after your visit with us. Thank you!             Your Updated Medication List - Protect others around you: Learn how to safely use, store and throw away your medicines at www.disposemymeds.org.          This list is accurate as of 4/3/18  3:23 PM.  Always use your most recent med list.                   Brand Name Dispense Instructions for use Diagnosis    acetaminophen 325 MG tablet    MAPAP    180 tablet    Take 1-2 tablets (325-650 mg) by mouth every 6 hours as needed for mild pain    Vitamin D insufficiency       alum & mag hydroxide-simethicone 400-400-40 MG/5ML Susp suspension    MYLANTA ES/MAALOX  ES    1 Bottle    Take 30 mLs by mouth 4 times daily as needed for indigestion    Gastroesophageal reflux disease without esophagitis       aspirin 81 MG chewable tablet     108 tablet    Take 1 tablet (81 mg) by mouth daily    Hyperlipidemia LDL goal <160       aspirin-acetaminophen-caffeine 250-250-65 MG per tablet    EXCEDRIN MIGRAINE    80 tablet    Take 1 tablet by mouth every 6 hours as needed for headaches    Tension headache       atorvastatin 10 MG tablet    LIPITOR    30 tablet    Take 1 tablet (10 mg) by mouth daily    Hyperlipidemia LDL goal <160       chlorhexidine 0.12 % solution    PERIDEX    120 mL    Swish and spit 15 mLs in mouth 2 times daily    Gingival bleeding       fluticasone 50 MCG/ACT spray    FLONASE    16 g    Spray 1 spray into both nostrils daily    Chronic seasonal allergic rhinitis due to pollen       glucosamine-chondroitinoitin 500-400 MG Tabs    CVS GLUCOSAMINE-CHONDROITIN    120 tablet    Take 2 tablets by mouth 2 times daily    Primary osteoarthritis of both knees       hydrocortisone 1 % cream     CORTAID    30 g    Apply sparingly to affected area three times daily for 14 days.    External hemorrhoids       hydrocortisone 2.5 % cream    ANUSOL-HC    30 g    Place rectally 2 times daily    External hemorrhoids       lidocaine 5 % ointment    XYLOCAINE    142 g    One application 4 x daily to affected areas lower back and knees if necessary Profile Rx: patient will contact pharmacy when needed    Age related osteoporosis, unspecified pathological fracture presence       loratadine 10 MG tablet    QC LORATADINE ALLERGY RELIEF    30 tablet    Take 1 tablet (10 mg) by mouth daily as needed    Chronic seasonal allergic rhinitis, unspecified trigger       metoprolol succinate 25 MG 24 hr tablet    TOPROL-XL    45 tablet    Take 0.5 tablets (12.5 mg) by mouth At Bedtime    Episodic tension-type headache, not intractable, Essential hypertension, benign       omeprazole 20 MG tablet    KLS OMPERAZOLE    30 tablet    Take 1 tablet (20 mg) by mouth daily    Gastroesophageal reflux disease without esophagitis       * order for DME     1 each    Cane of any materials of adujustable priya *one  Use as directed*    Tendency to fall       * order for DME     1 each    One cane of any material  One As directed adjustible if necessary    Primary osteoarthritis of both knees, Tendency to fall       * polyethylene glycol powder    MIRALAX    510 g    Take 17 g (1 capful) by mouth daily    Slow transit constipation       * polyethylene glycol powder    MIRALAX    510 g    Take 17 g (1 capful) by mouth daily    Slow transit constipation       senna-docusate 8.6-50 MG per tablet    SENOKOT-S;PERICOLACE    120 tablet    Take 1-2 tablets by mouth 2 times daily    Slow transit constipation       Vitamin D3 2000 UNITS Tabs     60 tablet    Take 2 tablets by mouth daily (with breakfast)    Vitamin D insufficiency       * Notice:  This list has 4 medication(s) that are the same as other medications prescribed for you. Read the directions  carefully, and ask your doctor or other care provider to review them with you.

## 2018-04-04 ASSESSMENT — ANXIETY QUESTIONNAIRES: GAD7 TOTAL SCORE: 0

## 2018-04-20 ENCOUNTER — OFFICE VISIT (OUTPATIENT)
Dept: FAMILY MEDICINE | Facility: CLINIC | Age: 78
End: 2018-04-20
Payer: COMMERCIAL

## 2018-04-20 ENCOUNTER — RADIANT APPOINTMENT (OUTPATIENT)
Dept: GENERAL RADIOLOGY | Facility: CLINIC | Age: 78
End: 2018-04-20
Attending: FAMILY MEDICINE
Payer: COMMERCIAL

## 2018-04-20 VITALS
WEIGHT: 161 LBS | HEART RATE: 53 BPM | OXYGEN SATURATION: 98 % | BODY MASS INDEX: 27.21 KG/M2 | TEMPERATURE: 100 F | DIASTOLIC BLOOD PRESSURE: 72 MMHG | RESPIRATION RATE: 16 BRPM | SYSTOLIC BLOOD PRESSURE: 138 MMHG

## 2018-04-20 DIAGNOSIS — E78.5 HYPERLIPIDEMIA LDL GOAL <160: Chronic | ICD-10-CM

## 2018-04-20 DIAGNOSIS — R07.82 INTERCOSTAL PAIN: ICD-10-CM

## 2018-04-20 DIAGNOSIS — G44.219 EPISODIC TENSION-TYPE HEADACHE, NOT INTRACTABLE: ICD-10-CM

## 2018-04-20 DIAGNOSIS — R07.82 INTERCOSTAL PAIN: Primary | ICD-10-CM

## 2018-04-20 DIAGNOSIS — R42 DIZZINESS: ICD-10-CM

## 2018-04-20 PROCEDURE — 93000 ELECTROCARDIOGRAM COMPLETE: CPT | Performed by: FAMILY MEDICINE

## 2018-04-20 PROCEDURE — 71046 X-RAY EXAM CHEST 2 VIEWS: CPT | Mod: FY

## 2018-04-20 PROCEDURE — 99214 OFFICE O/P EST MOD 30 MIN: CPT | Mod: 25 | Performed by: FAMILY MEDICINE

## 2018-04-20 ASSESSMENT — ANXIETY QUESTIONNAIRES
GAD7 TOTAL SCORE: 0
5. BEING SO RESTLESS THAT IT IS HARD TO SIT STILL: NOT AT ALL
7. FEELING AFRAID AS IF SOMETHING AWFUL MIGHT HAPPEN: NOT AT ALL
6. BECOMING EASILY ANNOYED OR IRRITABLE: NOT AT ALL
1. FEELING NERVOUS, ANXIOUS, OR ON EDGE: NOT AT ALL
3. WORRYING TOO MUCH ABOUT DIFFERENT THINGS: NOT AT ALL
2. NOT BEING ABLE TO STOP OR CONTROL WORRYING: NOT AT ALL

## 2018-04-20 ASSESSMENT — PATIENT HEALTH QUESTIONNAIRE - PHQ9: 5. POOR APPETITE OR OVEREATING: NOT AT ALL

## 2018-04-20 NOTE — MR AVS SNAPSHOT
After Visit Summary   4/20/2018    Rody Gonzalez    MRN: [id number]           Patient Information     Date Of Birth          1940        Visit Information        Provider Department      4/20/2018 2:00 PM Osman Yen MD; [employer] [facility]        Today's Diagnoses     Intercostal pain    -  1    Hyperlipidemia LDL goal <160        Episodic tension-type headache, not intractable        Dizziness          Care Instructions    (R07.82) Intercostal pain  (primary encounter diagnosis)  Comment:     Plan: EKG 12-lead complete w/read - Clinics, XR Chest        2 Views,         isometheptene-dichloralphenazone-acetaminophen         (MIDRIN) -325 MG per capsule             (E78.5) Hyperlipidemia LDL goal <160  Comment:    Plan: Lipid panel reflex to direct LDL Fasting             (G44.219) Episodic tension-type headache, not intractable  Comment:    Plan: isometheptene-dichloralphenazone-acetaminophen         (MIDRIN) -325 MG per capsule             (R42) Dizziness  Comment:    Plan: isometheptene-dichloralphenazone-acetaminophen         (MIDRIN) -325 MG per capsule                        Follow-ups after your visit        Follow-up notes from your care team     Return in about 12 weeks (around 7/13/2018).      Who to contact     If you have questions or need follow up information about today's clinic visit or your schedule please contact [facility] directly at [phone].  Normal or non-critical lab and imaging results will be communicated to you by MyChart, letter or phone within 4 business days after the clinic has received the results. If you do not hear from us within 7 days, please contact the clinic through MyChart or phone. If you have a critical or abnormal lab result, we will notify you by phone as soon as possible.  Submit refill requests through [employer] or call your  "pharmacy and they will forward the refill request to us. Please allow 3 business days for your refill to be completed.          Additional Information About Your Visit        [employer] Information     [employer] lets you send messages to your doctor, view your test results, renew your prescriptions, schedule appointments and more. To sign up, go to [email or web address] . Click on \"Log in\" on the left side of the screen, which will take you to the Welcome page. Then click on \"Sign up Now\" on the right side of the page.     You will be asked to enter the access code listed below, as well as some personal information. Please follow the directions to create your username and password.     Your access code is: [email or web address]  Expires: 2018  3:08 PM     Your access code will  in 90 days. If you need help or a new code, please call your [place] clinic or [phone].        Care EveryWhere ID     This is your [facility] EveryWhere ID. This could be used by other organizations to access your [place] medical records  [id number]        Your Vitals Were     Pulse Temperature Respirations Last Period Pulse Oximetry BMI (Body Mass Index)    53 100  F (37.8  C) (Tympanic) 16 (LMP Unknown) 98% 27.21 kg/m2       Blood Pressure from Last 3 Encounters:   18 138/72   18 128/64   18 140/82    Weight from Last 3 Encounters:   18 161 lb (73 kg)   18 160 lb (72.6 kg)   18 162 lb (73.5 kg)              We Performed the Following     EKG 12-lead complete w/read - Clinics     Lipid panel reflex to direct LDL Fasting          Today's Medication Changes          These changes are accurate as of 18  3:10 PM.  If you have any questions, ask your nurse or doctor.               Start taking these medicines.        Dose/Directions    isometheptene-dichloralphenazone-acetaminophen -325 MG per capsule   Commonly known as:  MIDRIN   Used for:  Intercostal pain, Episodic tension-type headache, not " intractable, Dizziness   Started by:  Osman Yen MD        Dose:  1 capsule   Take 1 capsule by mouth every 4 hours as needed for headaches   Quantity:  60 capsule   Refills:  1            Where to get your medicines      Some of these will need a paper prescription and others can be bought over the counter.  Ask your nurse if you have questions.     Bring a paper prescription for each of these medications     isometheptene-dichloralphenazone-acetaminophen -325 MG per capsule                Primary Care Provider Office Phone # Fax #    Osman Yen [phone] [address] [facility] [address]        Equal Access to Services     [facility]: Hadii aad ku hadasho Soomaali, waaxda luqadaha, qaybta kaalmada adeegyada, waxmyke haro . So [facility] [phone].    ATENCIÓN: Si habla español, tiene a hdez disposición servicios gratuitos de asistencia lingüística. [facility] [phone].    We comply with applicable federal civil rights laws and [place] laws. We do not discriminate on the basis of race, color, national origin, age, disability, sex, sexual orientation, or gender identity.            Thank you!     Thank you for choosing [facility]  for your care. Our goal is always to provide you with excellent care. Hearing back from our patients is one way we can continue to improve our services. Please take a few minutes to complete the written survey that you may receive in the mail after your visit with us. Thank you!             Your Updated Medication List - Protect others around you: Learn how to safely use, store and throw away your medicines at www.disposemymeds.org.          This list is accurate as of 4/20/18  3:10 PM.  Always use your most recent med list.                   Brand Name Dispense Instructions for use Diagnosis    acetaminophen 325 MG tablet    MAPAP    180 tablet    Take 1-2  tablets (325-650 mg) by mouth every 6 hours as needed for mild pain    Vitamin D insufficiency       alum & mag hydroxide-simethicone 400-400-40 MG/5ML Susp suspension    MYLANTA ES/MAALOX  ES    1 Bottle    Take 30 mLs by mouth 4 times daily as needed for indigestion    Gastroesophageal reflux disease without esophagitis       aspirin 81 MG chewable tablet     108 tablet    Take 1 tablet (81 mg) by mouth daily    Hyperlipidemia LDL goal <160       aspirin-acetaminophen-caffeine 250-250-65 MG per tablet    EXCEDRIN MIGRAINE    80 tablet    Take 1 tablet by mouth every 6 hours as needed for headaches    Tension headache       atorvastatin 10 MG tablet    LIPITOR    30 tablet    Take 1 tablet (10 mg) by mouth daily    Hyperlipidemia LDL goal <160       chlorhexidine 0.12 % solution    PERIDEX    120 mL    Swish and spit 15 mLs in mouth 2 times daily    Gingival bleeding       fluticasone 50 MCG/ACT spray    FLONASE    16 g    Spray 1 spray into both nostrils daily    Chronic seasonal allergic rhinitis due to pollen       glucosamine-chondroitinoitin 500-400 MG Tabs    CVS GLUCOSAMINE-CHONDROITIN    120 tablet    Take 2 tablets by mouth 2 times daily    Primary osteoarthritis of both knees       hydrocortisone 1 % cream    CORTAID    30 g    Apply sparingly to affected area three times daily for 14 days.    External hemorrhoids       hydrocortisone 2.5 % cream    ANUSOL-HC    30 g    Place rectally 2 times daily    External hemorrhoids       isometheptene-dichloralphenazone-acetaminophen -325 MG per capsule    MIDRIN    60 capsule    Take 1 capsule by mouth every 4 hours as needed for headaches    Intercostal pain, Episodic tension-type headache, not intractable, Dizziness       lidocaine 5 % ointment    XYLOCAINE    142 g    One application 4 x daily to affected areas lower back and knees if necessary Profile Rx: patient will contact pharmacy when needed    Age related osteoporosis, unspecified pathological  fracture presence       loratadine 10 MG tablet    QC LORATADINE ALLERGY RELIEF    30 tablet    Take 1 tablet (10 mg) by mouth daily as needed    Chronic seasonal allergic rhinitis, unspecified trigger       metoprolol succinate 25 MG 24 hr tablet    TOPROL-XL    45 tablet    Take 0.5 tablets (12.5 mg) by mouth At Bedtime    Episodic tension-type headache, not intractable, Essential hypertension, benign       omeprazole 20 MG tablet    KLS OMPERAZOLE    30 tablet    Take 1 tablet (20 mg) by mouth daily    Gastroesophageal reflux disease without esophagitis       * order for DME     1 each    Cane of any materials of adujustable priya *one  Use as directed*    Tendency to fall       * order for DME     1 each    One cane of any material  One As directed adjustible if necessary    Primary osteoarthritis of both knees, Tendency to fall       polyethylene glycol powder    MIRALAX    510 g    Take 17 g (1 capful) by mouth daily    Slow transit constipation       senna-docusate 8.6-50 MG per tablet    SENOKOT-S;PERICOLACE    120 tablet    Take 1-2 tablets by mouth 2 times daily    Slow transit constipation       Vitamin D3 2000 units Tabs     60 tablet    Take 2 tablets by mouth daily (with breakfast)    Vitamin D insufficiency       * Notice:  This list has 2 medication(s) that are the same as other medications prescribed for you. Read the directions carefully, and ask your doctor or other care provider to review them with you.

## 2018-04-20 NOTE — PATIENT INSTRUCTIONS
(R07.82) Intercostal pain  (primary encounter diagnosis)  Comment:     Plan: EKG 12-lead complete w/read - Clinics, XR Chest        2 Views,         isometheptene-dichloralphenazone-acetaminophen         (MIDRIN) -325 MG per capsule             (E78.5) Hyperlipidemia LDL goal <160  Comment:    Plan: Lipid panel reflex to direct LDL Fasting             (G44.219) Episodic tension-type headache, not intractable  Comment:    Plan: isometheptene-dichloralphenazone-acetaminophen         (MIDRIN) -325 MG per capsule             (R42) Dizziness  Comment:    Plan: isometheptene-dichloralphenazone-acetaminophen         (MIDRIN) -325 MG per capsule

## 2018-04-20 NOTE — PROGRESS NOTES
SUBJECTIVE:   Rody Gonzalez is a 78 year old female who presents to clinic today for the following health issues:      Sharp pain in left chest      Duration: 3 months    Description (location/character/radiation): left chest shoots into shoulder blade, into rib cage    Intensity:  Comes and goes,    Accompanying signs and symptoms: feels like stabbing pain then goes away    History (similar episodes/previous evaluation): None    Precipitating or alleviating factors: None    Therapies tried and outcome: None        HEADACHE AND NECK PAIN Follow-Up    Headaches symptoms:   ONGOING INTERMITTENT    Frequency:  1-2 TIMES PER WEEK      Duration of headaches:  SEVERAL HOURS     Able to do normal daily activities/work with migraines: Yes    Rescue/Relief medication:Midrin and WANTS TO  TRY MIDRIN              Effectiveness: moderate relief    Preventative medication: None    Neurologic complications: No new stroke-like symptoms, loss of vision or speech, numbness or weakness    In the past 4 weeks, how often have you gone to Urgent Care or the emergency room because of your headaches?  0      Problem list and histories reviewed & adjusted, as indicated.  Additional history: as documented      Patient Active Problem List   Diagnosis     Health Care Home     Gastroesophageal reflux disease without esophagitis     DDD (degenerative disc disease), lumbar     Dysuria     Anxiety state     Urinary frequency     Tuberculosis of peripheral lymph nodes     Nonspecific abnormal results of thyroid function study     Pulmonary tuberculosis     PPD positive     Papanicolaou smear of cervix with atypical squamous cells of undetermined significance (ASC-US)     Osteoporosis     Tear film insufficiency     Memory loss     Headache     Abdominal pain     Closed fracture of triquetral (cuneiform) bone of wrist     Other chronic pain     Abnormality of gait     Hearing loss     Malaise and fatigue     Acute cystitis     Major depression  in complete remission (H)     Sensory hearing loss, unilateral     Vitamin D deficiency disease     Hyperlipidemia LDL goal <160     Overweight (BMI 25.0-29.9)     Risk for falls     ACP (advance care planning)     Primary osteoarthritis of both knees     Tension headache     History of bloody stools     Essential hypertension, benign     Past Surgical History:   Procedure Laterality Date     HYSTERECTOMY  2004       Social History   Substance Use Topics     Smoking status: Never Smoker     Smokeless tobacco: Never Used     Alcohol use No     Family History   Problem Relation Age of Onset     Family History Negative Mother      Family History Negative Father      DIABETES No family hx of      Coronary Artery Disease No family hx of      Hypertension No family hx of      Hyperlipidemia No family hx of      CEREBROVASCULAR DISEASE No family hx of      Breast Cancer No family hx of      Colon Cancer No family hx of      Prostate Cancer No family hx of      Other Cancer No family hx of      Depression No family hx of      Anxiety Disorder No family hx of      MENTAL ILLNESS No family hx of      Substance Abuse No family hx of      Anesthesia Reaction No family hx of      Asthma No family hx of      OSTEOPOROSIS No family hx of      Genetic Disorder No family hx of      Thyroid Disease No family hx of      Obesity No family hx of      Unknown/Adopted No family hx of          Current Outpatient Prescriptions   Medication Sig Dispense Refill     acetaminophen (MAPAP) 325 MG tablet Take 1-2 tablets (325-650 mg) by mouth every 6 hours as needed for mild pain 180 tablet 11     alum & mag hydroxide-simethicone (MYLANTA ES/MAALOX  ES) 400-400-40 MG/5ML SUSP suspension Take 30 mLs by mouth 4 times daily as needed for indigestion 1 Bottle 11     aspirin 81 MG chewable tablet Take 1 tablet (81 mg) by mouth daily (Patient not taking: Reported on 4/20/2018) 108 tablet 3     aspirin-acetaminophen-caffeine (EXCEDRIN MIGRAINE)  250-250-65 MG per tablet Take 1 tablet by mouth every 6 hours as needed for headaches 80 tablet 11     atorvastatin (LIPITOR) 10 MG tablet Take 1 tablet (10 mg) by mouth daily 30 tablet 11     chlorhexidine (CHLORHEXIDINE) 0.12 % solution Swish and spit 15 mLs in mouth 2 times daily 120 mL 3     Cholecalciferol (VITAMIN D3) 2000 UNITS TABS Take 2 tablets by mouth daily (with breakfast) 60 tablet 11     fluticasone (FLONASE) 50 MCG/ACT spray Spray 1 spray into both nostrils daily 16 g 11     glucosamine-chondroitinoitin ([facility] GLUCOSAMINE-CHONDROITIN) 500-400 MG TABS Take 2 tablets by mouth 2 times daily 120 tablet 11     hydrocortisone (ANUSOL-HC) 2.5 % cream Place rectally 2 times daily 30 g 11     hydrocortisone (CORTAID) 1 % cream Apply sparingly to affected area three times daily for 14 days. 30 g 5     isometheptene-dichloralphenazone-acetaminophen (MIDRIN) -325 MG per capsule Take 1 capsule by mouth every 4 hours as needed for headaches 60 capsule 1     lidocaine (XYLOCAINE) 5 % ointment One application 4 x daily to affected areas lower back and knees if necessary  Profile Rx: patient will contact pharmacy when needed 142 g 11     loratadine (QC LORATADINE ALLERGY RELIEF) 10 MG tablet Take 1 tablet (10 mg) by mouth daily as needed 30 tablet 11     metoprolol succinate (TOPROL-XL) 25 MG 24 hr tablet Take 0.5 tablets (12.5 mg) by mouth At Bedtime 45 tablet 3     omeprazole (KLS OMPERAZOLE) 20 MG tablet Take 1 tablet (20 mg) by mouth daily 30 tablet 11     order for DME Cane of any materials of adujustable priya  *one   Use as directed* 1 each 0     order for DME One cane of any material   One  As directed  adjustible if necessary 1 each 0     polyethylene glycol (MIRALAX) powder Take 17 g (1 capful) by mouth daily 510 g 11     senna-docusate (SENOKOT-S;PERICOLACE) 8.6-50 MG per tablet Take 1-2 tablets by mouth 2 times daily 120 tablet 1     Allergies   Allergen Reactions     Isoniazid      Pyrazinamide       Recent Labs   Lab Test  07/18/17   1605  01/12/17   1510  04/13/16   0943  12/04/15   1530  04/10/15   1257  03/04/14   1050  09/13/13   1125   LDL   --    --   126*  125*  116  134*  120   HDL   --    --   41*  53  42*  42*  41*   TRIG   --    --   131  100  77  84  99   ALT  25   --    --   30  44  22   --    CR  0.67  0.63  0.70   --   0.80  0.70  0.70   GFRESTIMATED  85  >90  Non  GFR Calc    81   --   70  82  82   GFRESTBLACK  >90   GFR Calc    >90   GFR Calc    >90   --   85  >90  >90   POTASSIUM  4.0  4.2  4.0   --   4.7  4.5  4.4   TSH   --    --    --    --    --   1.47  1.86      BP Readings from Last 3 Encounters:   04/20/18 138/72   04/03/18 128/64   02/16/18 140/82    Wt Readings from Last 3 Encounters:   04/20/18 161 lb (73 kg)   04/03/18 160 lb (72.6 kg)   02/16/18 162 lb (73.5 kg)                  Labs reviewed in EPIC    Reviewed and updated as needed this visit by clinical staff       Reviewed and updated as needed this visit by Provider         ROS: has Health Care Home; Gastroesophageal reflux disease without esophagitis; DDD (degenerative disc disease), lumbar; Dysuria; Anxiety state; Urinary frequency; Tuberculosis of peripheral lymph nodes; Nonspecific abnormal results of thyroid function study; Pulmonary tuberculosis; PPD positive; Papanicolaou smear of cervix with atypical squamous cells of undetermined significance (ASC-US); Osteoporosis; Tear film insufficiency; Memory loss; Headache; Abdominal pain; Closed fracture of triquetral (cuneiform) bone of wrist; Other chronic pain; Abnormality of gait; Hearing loss; Malaise and fatigue; Acute cystitis; Major depression in complete remission (H); Sensory hearing loss, unilateral; Vitamin D deficiency disease; Hyperlipidemia LDL goal <160; Overweight (BMI 25.0-29.9); Risk for falls; ACP (advance care planning); Primary osteoarthritis of both knees; Tension headache; History of bloody stools; and  Essential hypertension, benign on her problem list.    Constitutional, HEENT, cardiovascular, pulmonary, gi and gu systems are negative, except as otherwise noted.  CONSTITUTIONAL: NEGATIVE for fever, chills, change in weight  INTEGUMENTARY/SKIN: NEGATIVE for worrisome rashes, moles or lesions  EYES: NEGATIVE for vision changes or irritation  ENT/MOUTH: NEGATIVE for ear, mouth and throat problems  RESP: NEGATIVE for significant cough or SOB  BREAST: NEGATIVE for masses, tenderness or discharge  CV: NEGATIVE for chest pain, palpitations or peripheral edema  GI: NEGATIVE for nausea, abdominal pain, heartburn, or change in bowel habits  : NEGATIVE for frequency, dysuria, or hematuria  MUSCULOSKELETAL: NECK PAIN AND HEADACHES   NEURO: Hx headaches-musculoskelatal  ENDOCRINE: NEGATIVE for temperature intolerance, skin/hair changes  HEME: NEGATIVE for bleeding problems  PSYCHIATRIC: NEGATIVE for changes in mood or affect    OBJECTIVE:     /72 (Cuff Size: Adult Regular)  Pulse 53  Temp 100  F (37.8  C) (Tympanic)  Resp 16  Wt 161 lb (73 kg)  LMP  (LMP Unknown)  SpO2 98%  BMI 27.21 kg/m2  Body mass index is 27.21 kg/(m^2).  GENERAL: healthy, alert and no distress  EYES: Eyes grossly normal to inspection, PERRL and conjunctivae and sclerae normal  HENT: ear canals and TM's normal, nose and mouth without ulcers or lesions  NECK: no adenopathy, no asymmetry, masses, or scars and thyroid normal to palpation  RESP: lungs clear to auscultation - no rales, rhonchi or wheezes  CV: regular rate and rhythm, normal S1 S2, no S3 or S4, no murmur, click or rub, no peripheral edema and peripheral pulses strong  ABDOMEN: soft, nontender, no hepatosplenomegaly, no masses and bowel sounds normal  MS: no gross musculoskeletal defects noted, no edema  SKIN: no suspicious lesions or rashes    Diagnostic Test Results:    NORMAL CHEST XRAY AND ELECTROCARDIOGRAM ON MY READING  ASSESSMENT/PLAN:           ICD-10-CM    1. Intercostal  pain R07.82 EKG 12-lead complete w/read - Clinics     XR Chest 2 Views     isometheptene-dichloralphenazone-acetaminophen (MIDRIN) -325 MG per capsule   2. Hyperlipidemia LDL goal <160 E78.5 Lipid panel reflex to direct LDL Fasting   3. Episodic tension-type headache, not intractable G44.219 isometheptene-dichloralphenazone-acetaminophen (MIDRIN) -325 MG per capsule   4. Dizziness R42 isometheptene-dichloralphenazone-acetaminophen (MIDRIN) -325 MG per capsule       Patient Instructions   (R07.82) Intercostal pain  (primary encounter diagnosis)  Comment:     Plan: EKG 12-lead complete w/read - Clinics, XR Chest        2 Views,         isometheptene-dichloralphenazone-acetaminophen         (MIDRIN) -325 MG per capsule             (E78.5) Hyperlipidemia LDL goal <160  Comment:    Plan: Lipid panel reflex to direct LDL Fasting             (G44.219) Episodic tension-type headache, not intractable  Comment:    Plan: isometheptene-dichloralphenazone-acetaminophen         (MIDRIN) -325 MG per capsule             (R42) Dizziness  Comment:    Plan: isometheptene-dichloralphenazone-acetaminophen         (MIDRIN) -325 MG per capsule                    GAYLA VEGA MD  [facility]

## 2018-04-20 NOTE — NURSING NOTE
"Chief Complaint   Patient presents with     Chest Pain       Initial /72 (Cuff Size: Adult Regular)  Pulse 53  Temp 100  F (37.8  C) (Tympanic)  Resp 16  Wt 161 lb (73 kg)  LMP  (LMP Unknown)  SpO2 98%  BMI 27.21 kg/m2 Estimated body mass index is 27.21 kg/(m^2) as calculated from the following:    Height as of 2/16/18: 5' 4.5\" (1.638 m).    Weight as of this encounter: 161 lb (73 kg).  Medication Reconciliation: complete   Janet Lunsford CMA    "

## 2018-04-21 ASSESSMENT — ANXIETY QUESTIONNAIRES: GAD7 TOTAL SCORE: 0

## 2018-05-11 ENCOUNTER — OFFICE VISIT (OUTPATIENT)
Dept: FAMILY MEDICINE | Facility: CLINIC | Age: 78
End: 2018-05-11
Payer: COMMERCIAL

## 2018-05-11 VITALS
OXYGEN SATURATION: 98 % | SYSTOLIC BLOOD PRESSURE: 128 MMHG | TEMPERATURE: 98.4 F | HEART RATE: 60 BPM | DIASTOLIC BLOOD PRESSURE: 78 MMHG | BODY MASS INDEX: 27.55 KG/M2 | WEIGHT: 163 LBS | RESPIRATION RATE: 16 BRPM

## 2018-05-11 DIAGNOSIS — M81.0 AGE RELATED OSTEOPOROSIS, UNSPECIFIED PATHOLOGICAL FRACTURE PRESENCE: ICD-10-CM

## 2018-05-11 DIAGNOSIS — E78.5 HYPERLIPIDEMIA LDL GOAL <160: Chronic | ICD-10-CM

## 2018-05-11 DIAGNOSIS — G44.219 EPISODIC TENSION-TYPE HEADACHE, NOT INTRACTABLE: ICD-10-CM

## 2018-05-11 DIAGNOSIS — R42 DIZZINESS: ICD-10-CM

## 2018-05-11 DIAGNOSIS — M25.512 ACUTE PAIN OF LEFT SHOULDER: ICD-10-CM

## 2018-05-11 DIAGNOSIS — M54.2 NECK PAIN: Primary | ICD-10-CM

## 2018-05-11 DIAGNOSIS — R07.82 INTERCOSTAL PAIN: ICD-10-CM

## 2018-05-11 DIAGNOSIS — E83.52 HYPERCALCEMIA: ICD-10-CM

## 2018-05-11 DIAGNOSIS — E55.9 VITAMIN D INSUFFICIENCY: ICD-10-CM

## 2018-05-11 DIAGNOSIS — R53.82 CHRONIC FATIGUE: ICD-10-CM

## 2018-05-11 DIAGNOSIS — K21.9 GASTROESOPHAGEAL REFLUX DISEASE WITHOUT ESOPHAGITIS: ICD-10-CM

## 2018-05-11 LAB
ERYTHROCYTE [DISTWIDTH] IN BLOOD BY AUTOMATED COUNT: 13.2 % (ref 10–15)
ERYTHROCYTE [SEDIMENTATION RATE] IN BLOOD BY WESTERGREN METHOD: 19 MM/H (ref 0–30)
HCT VFR BLD AUTO: 39.4 % (ref 35–47)
HGB BLD-MCNC: 13 G/DL (ref 11.7–15.7)
MCH RBC QN AUTO: 29.9 PG (ref 26.5–33)
MCHC RBC AUTO-ENTMCNC: 33 G/DL (ref 31.5–36.5)
MCV RBC AUTO: 91 FL (ref 78–100)
PLATELET # BLD AUTO: 171 10E9/L (ref 150–450)
RBC # BLD AUTO: 4.35 10E12/L (ref 3.8–5.2)
WBC # BLD AUTO: 5.4 10E9/L (ref 4–11)

## 2018-05-11 PROCEDURE — 85027 COMPLETE CBC AUTOMATED: CPT | Performed by: FAMILY MEDICINE

## 2018-05-11 PROCEDURE — 80053 COMPREHEN METABOLIC PANEL: CPT | Performed by: FAMILY MEDICINE

## 2018-05-11 PROCEDURE — 83721 ASSAY OF BLOOD LIPOPROTEIN: CPT | Performed by: FAMILY MEDICINE

## 2018-05-11 PROCEDURE — 36415 COLL VENOUS BLD VENIPUNCTURE: CPT | Performed by: FAMILY MEDICINE

## 2018-05-11 PROCEDURE — 85652 RBC SED RATE AUTOMATED: CPT | Performed by: FAMILY MEDICINE

## 2018-05-11 PROCEDURE — 99214 OFFICE O/P EST MOD 30 MIN: CPT | Performed by: FAMILY MEDICINE

## 2018-05-11 RX ORDER — ACETAMINOPHEN 325 MG/1
325-650 TABLET ORAL EVERY 6 HOURS PRN
Qty: 180 TABLET | Refills: 11 | Status: SHIPPED | OUTPATIENT
Start: 2018-05-11 | End: 2018-10-05

## 2018-05-11 RX ORDER — LIDOCAINE 50 MG/G
OINTMENT TOPICAL
Qty: 150 G | Refills: 3 | Status: SHIPPED | OUTPATIENT
Start: 2018-05-11 | End: 2018-10-05

## 2018-05-11 ASSESSMENT — PATIENT HEALTH QUESTIONNAIRE - PHQ9
SUM OF ALL RESPONSES TO PHQ QUESTIONS 1-9: 1
SUM OF ALL RESPONSES TO PHQ QUESTIONS 1-9: 1

## 2018-05-11 NOTE — PROGRESS NOTES
2  SUBJECTIVE:   Rody Gonzalez is a 78 year old female who presents to clinic today for the following health issues:      Musculoskeletal problem/pain 05/11/18       Duration: ongoing    Description  Location: left shouder    Intensity:  moderate    Accompanying signs and symptoms: shooting, comes and goes    History  Previous similar problem: YES  Previous evaluation:  Chest x-ray on 4/20    Precipitating or alleviating factors:  Trauma or overuse: no   Aggravating factors include: lifting and when arm is hanging    Therapies tried and outcome: nothing        Sharp pain in left chest LAST OFFICE VISIT       Duration: 4 months    Description (location/character/radiation): left chest shoots into shoulder blade, into rib cage    Intensity:  Comes and goes,    Accompanying signs and symptoms: feels like stabbing pain then goes away    History (similar episodes/previous evaluation): None    Precipitating or alleviating factors: None    Therapies tried and outcome: None        HEADACHE AND NECK PAIN Follow-Up    Headaches symptoms:   ONGOING INTERMITTENT    Frequency:  1-2 TIMES PER WEEK      Duration of headaches:  SEVERAL HOURS     Able to do normal daily activities/work with migraines: Yes    Rescue/Relief medication:Midrin and WANTS TO  TRY MIDRIN              Effectiveness: moderate relief    Preventative medication: None    Neurologic complications: No new stroke-like symptoms, loss of vision or speech, numbness or weakness    In the past 4 weeks, how often have you gone to Urgent Care or the emergency room because of your headaches?  0    .GAYLA VEGA MD .5/11/2018 6:58 PM .May 11, 2018        Rody Gonzalez is a 78 year old female who is who presents with  NECK SHOULDER AND CHEST WALL PAIN     LEFT SIDED WITH RADIATION INTO LEFT SCAPULA    WANTS A DIFFERENTIAL DIAGNOSIS CERVICAL DISC DISEASE     ROTATOR CUFF    PAIN with INTERNAL  AND EXTERNAL ROTATION     Onset :  3 WEEKS      Severity: MODERATE        Home treatments ONGOING      Additional Symptoms:  NO SIGNIFICANT NUMBNESS      Course ONGOING SYMPTOMS     WANTS TO SEE SPECIALIST AND HAVE AND MAGNETIC RESONANCE IMAGING     NEVER GOT MIDRIN AS PHARMACY WAS OUT OF THIS     HISTORY OF  CHRONIC HEADACHES               Topic Date Due     TETANUS IMMUNIZATION (SYSTEM ASSIGNED)  09/01/2016     LIPID MONITORING Q1 YEAR  04/13/2017               .  Current Outpatient Prescriptions   Medication Sig Dispense Refill     acetaminophen (MAPAP) 325 MG tablet Take 1-2 tablets (325-650 mg) by mouth every 6 hours as needed for mild pain 180 tablet 11     alum & mag hydroxide-simethicone (MYLANTA ES/MAALOX  ES) 400-400-40 MG/5ML SUSP suspension Take 30 mLs by mouth 4 times daily as needed for indigestion 1 Bottle 11     aspirin 81 MG chewable tablet Take 1 tablet (81 mg) by mouth daily 108 tablet 3     atorvastatin (LIPITOR) 10 MG tablet Take 1 tablet (10 mg) by mouth daily 30 tablet 11     chlorhexidine (CHLORHEXIDINE) 0.12 % solution Swish and spit 15 mLs in mouth 2 times daily 120 mL 3     Cholecalciferol (VITAMIN D3) 2000 UNITS TABS Take 2 tablets by mouth daily (with breakfast) 60 tablet 11     fluticasone (FLONASE) 50 MCG/ACT spray Spray 1 spray into both nostrils daily 16 g 11     glucosamine-chondroitinoitin ([employer] GLUCOSAMINE-CHONDROITIN) 500-400 MG TABS Take 2 tablets by mouth 2 times daily 120 tablet 11     hydrocortisone (ANUSOL-HC) 2.5 % cream Place rectally 2 times daily 30 g 11     hydrocortisone (CORTAID) 1 % cream Apply sparingly to affected area three times daily for 14 days. 30 g 5     isometheptene-dichloralphenazone-acetaminophen (MIDRIN) -325 MG per capsule Take 1 capsule by mouth every 4 hours as needed for headaches 60 capsule 1     lidocaine (XYLOCAINE) 5 % ointment One application 4 x daily to affected areas lower back and knees if necessary Profile Rx: patient will contact pharmacy when needed 150 g 3     loratadine (QC LORATADINE ALLERGY RELIEF) 10  MG tablet Take 1 tablet (10 mg) by mouth daily as needed 30 tablet 11     metoprolol succinate (TOPROL-XL) 25 MG 24 hr tablet Take 0.5 tablets (12.5 mg) by mouth At Bedtime 45 tablet 3     omeprazole (KLS OMPERAZOLE) 20 MG tablet Take 1 tablet (20 mg) by mouth daily 30 tablet 11     order for DME Cane of any materials of adujustable priya  *one   Use as directed* 1 each 0     order for DME One cane of any material   One  As directed  adjustible if necessary 1 each 0     polyethylene glycol (MIRALAX) powder Take 17 g (1 capful) by mouth daily 510 g 11     senna-docusate (SENOKOT-S;PERICOLACE) 8.6-50 MG per tablet Take 1-2 tablets by mouth 2 times daily 120 tablet 1              Allergies   Allergen Reactions     Isoniazid      Pyrazinamide            Immunization History   Administered Date(s) Administered     Pneumococcal 23 valent 2006     Tdap (Adacel,Boostrix) 2006               reports that she does not drink alcohol.          reports that she does not use illicit drugs.        family history includes Family History Negative in her father and mother. There is no history of DIABETES, Coronary Artery Disease, Hypertension, Hyperlipidemia, CEREBROVASCULAR DISEASE, Breast Cancer, Colon Cancer, Prostate Cancer, Other Cancer, Depression, Anxiety Disorder, MENTAL ILLNESS, Substance Abuse, Anesthesia Reaction, Asthma, OSTEOPOROSIS, Genetic Disorder, Thyroid Disease, Obesity, or Unknown/Adopted.        indicated that the status of her no family hx of is unknown. She indicated that her mother is . She indicated that her father is .          has a past surgical history that includes Hysterectomy ().         reports that she does not currently engage in sexual activity but has had male partners.;    .  Pediatric History   Patient Guardian Status     Not on file.     Other Topics Concern     Parent/Sibling W/ Cabg, Mi Or Angioplasty Before 65f 55m? No     Social History Narrative                reports that she has never smoked. She has never used smokeless tobacco.        Medical, social, surgical, and family histories reviewed.        Labs reviewed in EPIC  Patient Active Problem List   Diagnosis     Health Care Home     Gastroesophageal reflux disease without esophagitis     DDD (degenerative disc disease), lumbar     Dysuria     Anxiety state     Urinary frequency     Tuberculosis of peripheral lymph nodes     Nonspecific abnormal results of thyroid function study     Pulmonary tuberculosis     PPD positive     Papanicolaou smear of cervix with atypical squamous cells of undetermined significance (ASC-US)     Osteoporosis     Tear film insufficiency     Memory loss     Headache     Abdominal pain     Closed fracture of triquetral (cuneiform) bone of wrist     Other chronic pain     Abnormality of gait     Hearing loss     Malaise and fatigue     Acute cystitis     Major depression in complete remission (H)     Sensory hearing loss, unilateral     Vitamin D deficiency disease     Hyperlipidemia LDL goal <160     Overweight (BMI 25.0-29.9)     Risk for falls     ACP (advance care planning)     Primary osteoarthritis of both knees     Tension headache     History of bloody stools     Essential hypertension, benign       Past Surgical History:   Procedure Laterality Date     HYSTERECTOMY  2004         Social History   Substance Use Topics     Smoking status: Never Smoker     Smokeless tobacco: Never Used     Alcohol use No       Family History   Problem Relation Age of Onset     Family History Negative Mother      Family History Negative Father      DIABETES No family hx of      Coronary Artery Disease No family hx of      Hypertension No family hx of      Hyperlipidemia No family hx of      CEREBROVASCULAR DISEASE No family hx of      Breast Cancer No family hx of      Colon Cancer No family hx of      Prostate Cancer No family hx of      Other Cancer No family hx of      Depression No family hx of       Anxiety Disorder No family hx of      MENTAL ILLNESS No family hx of      Substance Abuse No family hx of      Anesthesia Reaction No family hx of      Asthma No family hx of      OSTEOPOROSIS No family hx of      Genetic Disorder No family hx of      Thyroid Disease No family hx of      Obesity No family hx of      Unknown/Adopted No family hx of              Current Outpatient Prescriptions   Medication Sig Dispense Refill     acetaminophen (MAPAP) 325 MG tablet Take 1-2 tablets (325-650 mg) by mouth every 6 hours as needed for mild pain 180 tablet 11     alum & mag hydroxide-simethicone (MYLANTA ES/MAALOX  ES) 400-400-40 MG/5ML SUSP suspension Take 30 mLs by mouth 4 times daily as needed for indigestion 1 Bottle 11     aspirin 81 MG chewable tablet Take 1 tablet (81 mg) by mouth daily 108 tablet 3     atorvastatin (LIPITOR) 10 MG tablet Take 1 tablet (10 mg) by mouth daily 30 tablet 11     chlorhexidine (CHLORHEXIDINE) 0.12 % solution Swish and spit 15 mLs in mouth 2 times daily 120 mL 3     Cholecalciferol (VITAMIN D3) 2000 UNITS TABS Take 2 tablets by mouth daily (with breakfast) 60 tablet 11     fluticasone (FLONASE) 50 MCG/ACT spray Spray 1 spray into both nostrils daily 16 g 11     glucosamine-chondroitinoitin ([employer]S GLUCOSAMINE-CHONDROITIN) 500-400 MG TABS Take 2 tablets by mouth 2 times daily 120 tablet 11     hydrocortisone (ANUSOL-HC) 2.5 % cream Place rectally 2 times daily 30 g 11     hydrocortisone (CORTAID) 1 % cream Apply sparingly to affected area three times daily for 14 days. 30 g 5     isometheptene-dichloralphenazone-acetaminophen (MIDRIN) -325 MG per capsule Take 1 capsule by mouth every 4 hours as needed for headaches 60 capsule 1     lidocaine (XYLOCAINE) 5 % ointment One application 4 x daily to affected areas lower back and knees if necessary Profile Rx: patient will contact pharmacy when needed 150 g 3     loratadine (QC LORATADINE ALLERGY RELIEF) 10 MG tablet Take 1 tablet (10 mg) by  mouth daily as needed 30 tablet 11     metoprolol succinate (TOPROL-XL) 25 MG 24 hr tablet Take 0.5 tablets (12.5 mg) by mouth At Bedtime 45 tablet 3     omeprazole (KLS OMPERAZOLE) 20 MG tablet Take 1 tablet (20 mg) by mouth daily 30 tablet 11     order for DME Cane of any materials of adujustable priya  *one   Use as directed* 1 each 0     order for DME One cane of any material   One  As directed  adjustible if necessary 1 each 0     polyethylene glycol (MIRALAX) powder Take 17 g (1 capful) by mouth daily 510 g 11     senna-docusate (SENOKOT-S;PERICOLACE) 8.6-50 MG per tablet Take 1-2 tablets by mouth 2 times daily 120 tablet 1           Recent Labs   Lab Test  07/18/17   1605  01/12/17   1510  04/13/16   0943  12/04/15   1530  04/10/15   1257  03/04/14   1050  09/13/13   1125   LDL   --    --   126*  125*  116  134*  120   HDL   --    --   41*  53  42*  42*  41*   TRIG   --    --   131  100  77  84  99   ALT  25   --    --   30  44  22   --    CR  0.67  0.63  0.70   --   0.80  0.70  0.70   GFRESTIMATED  85  >90  Non  GFR Calc    81   --   70  82  82   GFRESTBLACK  >90   GFR Calc    >90   GFR Calc    >90   --   85  >90  >90   POTASSIUM  4.0  4.2  4.0   --   4.7  4.5  4.4   TSH   --    --    --    --    --   1.47  1.86            BP Readings from Last 6 Encounters:   05/11/18 128/78   04/20/18 138/72   04/03/18 128/64   02/16/18 140/82   09/28/17 148/78   09/14/17 128/68           Wt Readings from Last 3 Encounters:   05/11/18 163 lb (73.9 kg)   04/20/18 161 lb (73 kg)   04/03/18 160 lb (72.6 kg)                 Positive symptoms or findings indicated by bold designation:         ROS: 10 point ROS neg other than the symptoms noted above in the HPI.except  has Health Care Home; Gastroesophageal reflux disease without esophagitis; DDD (degenerative disc disease), lumbar; Dysuria; Anxiety state; Urinary frequency; Tuberculosis of peripheral lymph nodes; Nonspecific  abnormal results of thyroid function study; Pulmonary tuberculosis; PPD positive; Papanicolaou smear of cervix with atypical squamous cells of undetermined significance (ASC-US); Osteoporosis; Tear film insufficiency; Memory loss; Headache; Abdominal pain; Closed fracture of triquetral (cuneiform) bone of wrist; Other chronic pain; Abnormality of gait; Hearing loss; Malaise and fatigue; Acute cystitis; Major depression in complete remission (H); Sensory hearing loss, unilateral; Vitamin D deficiency disease; Hyperlipidemia LDL goal <160; Overweight (BMI 25.0-29.9); Risk for falls; ACP (advance care planning); Primary osteoarthritis of both knees; Tension headache; History of bloody stools; and Essential hypertension, benign on her problem list.  Review Of Systems    Skin: negative    Eyes: negative    Ears/Nose/Throat: negative    Respiratory: No shortness of breath, dyspnea on exertion, cough, or hemoptysis    Cardiovascular: negative    Gastrointestinal: negative    Genitourinary: negative    Musculoskeletal: back pain, neck pain, arthritis, joint pain and NECK PAIN     with DECREASE RANGE OF MOTION     RADICULOPATHY LEFT SCAPULA AND SHOULDER     PAIN INTERNAL  AND EXTERNAL ROTATION OF SHOULDER AS WELL     POSITIVE IMPINGEMENT SIGN    NO SUBLUXATION    Neurologic: negative    Psychiatric: negative    Hematologic/Lymphatic/Immunologic: negative    Endocrine: negative                PE:  /78 (Cuff Size: Adult Large)  Pulse 60  Temp 98.4  F (36.9  C) (Tympanic)  Resp 16  Wt 163 lb (73.9 kg)  LMP  (LMP Unknown)  SpO2 98%  BMI 27.55 kg/m2 Body mass index is 27.55 kg/(m^2).        Constitutional: general appearance, well nourished, well developed, in no acute distress, well developed, appears stated age, normal body habitus,     OVER WEIGHT         Eyes:; The patient has normal eyelids sclerae and conjunctivae :          Ears/Nose/Throat: external ear, overall: normal appearance; external nose, overall:  benign appearance, normal moujth gums and lips           Neck: thyroid, overall: normal size, normal consistency, nontender,          Respiratory:  palpation of chest, overall: normal excursion,    Clear to percussion and auscultation      NO Tachypnea     NORMAL  Color          Cardiovascular:  Good color with no peripheral edema    Regular sinus rhythm without murmur.  Physiologic heart sounds   Heart is unelarged    .     Chest/Breast: normal shape           Abdominal exam,  Liver and spleen are  unenlarged        Tenderness    Scars              Urogenital; no renal, flank or bladder  tenderness;          Lymphatic: neck nodes,     Other nodes         Musculoskeletal:  Brief ortho exam normal except:   DECREASE RANGE OF MOTION OF NECK     NORMAL REFLEXES UPPER EXTREMETIES     PAIN LEFT INTERNAL  AND EXTERNAL ROTATION     PAINFUL ARC NOTE LEFT SHOULDER          Integument: inspection of skin, no rash, lesions; and, palpation, no induration, no tenderness.          Neurologic mental status, overall: alert and oriented; gait, no ataxia, no unsteadiness; coordination, no tremors; cranial nerves, overall: normal motor, overall: normal bulk, tone.          Psychiatric: orientation/consciousness, overall: oriented to person, place and time; behavior/psychomotor activity, no tics, normal psychomotor activity; mood and affect, overall: normal mood and affect; appearance, overall: well-groomed, good eye contact; speech, overall: normal quality, no aphasia and normal quality, quantity, intact.        Diagnostic Test Results:  Results for orders placed or performed in visit on 05/11/18   CBC with platelets   Result Value Ref Range    WBC 5.4 4.0 - 11.0 10e9/L    RBC Count 4.35 3.8 - 5.2 10e12/L    Hemoglobin 13.0 11.7 - 15.7 g/dL    Hematocrit 39.4 35.0 - 47.0 %    MCV 91 78 - 100 fl    MCH 29.9 26.5 - 33.0 pg    MCHC 33.0 31.5 - 36.5 g/dL    RDW 13.2 10.0 - 15.0 %    Platelet Count 171 150 - 450 10e9/L   Erythrocyte  sedimentation rate auto   Result Value Ref Range    Sed Rate 19 0 - 30 mm/h           ICD-10-CM    1. Neck pain M54.2 ORTHOPEDICS ADULT REFERRAL     CANCELED: ORTHO  REFERRAL    CERVICAL DISC DZ    2. Age related osteoporosis, unspecified pathological fracture presence M81.0 lidocaine (XYLOCAINE) 5 % ointment   3. Vitamin D insufficiency E55.9 acetaminophen (MAPAP) 325 MG tablet   4. Acute pain of left shoulder M25.512 ORTHOPEDICS ADULT REFERRAL     CANCELED: ORTHO  REFERRAL    NECK SHOULDER AND CHEST WALL LEFT SIDED X 3 WEEKS    5. Intercostal pain R07.82 isometheptene-dichloralphenazone-acetaminophen (MIDRIN) -325 MG per capsule   6. Episodic tension-type headache, not intractable G44.219 isometheptene-dichloralphenazone-acetaminophen (MIDRIN) -325 MG per capsule   7. Dizziness R42 isometheptene-dichloralphenazone-acetaminophen (MIDRIN) -325 MG per capsule   8. Hypercalcemia E83.52    9. Hyperlipidemia LDL goal <160 E78.5 Comprehensive metabolic panel     LDL cholesterol direct   10. Gastroesophageal reflux disease without esophagitis K21.9 CBC with platelets   11. Chronic fatigue R53.82 Comprehensive metabolic panel     CBC with platelets     Erythrocyte sedimentation rate auto              .    Side effects benefits and risks thoroughly discussed. .she may come in early if unimproved or getting worse          Please drink 2 glasses of water prior to meals and walk 15-30 minutes after meals        I spent 25 MINUTES SPENT  with patient discussing the following issues   The primary encounter diagnosis was Neck pain. Diagnoses of Age related osteoporosis, unspecified pathological fracture presence, Vitamin D insufficiency, Acute pain of left shoulder, Intercostal pain, Episodic tension-type headache, not intractable, Dizziness, Hypercalcemia, Hyperlipidemia LDL goal <160, Gastroesophageal reflux disease without esophagitis, and Chronic fatigue were also pertinent to this visit.  over half of which involved counseling and coordination of care.      Patient Instructions   (M54.2) Neck pain  (primary encounter diagnosis)  Comment: CERVICAL DISC DZ   Plan: ORTHO  REFERRAL             (M81.0) Age related osteoporosis, unspecified pathological fracture presence  Comment:    Plan: lidocaine (XYLOCAINE) 5 % ointment             (E55.9) Vitamin D insufficiency  Comment:    Plan: acetaminophen (MAPAP) 325 MG tablet             (M25.512) Acute pain of left shoulder  Comment: NECK SHOULDER AND CHEST WALL LEFT SIDED X 3 WEEKS   Plan: ORTHO  REFERRAL             (R07.82) Intercostal pain  Comment:    Plan: isometheptene-dichloralphenazone-acetaminophen         (MIDRIN) -325 MG per capsule             (G44.219) Episodic tension-type headache, not intractable  Comment:    Plan: isometheptene-dichloralphenazone-acetaminophen         (MIDRIN) -325 MG per capsule             HEADACHES   Plan: isometheptene-dichloralphenazone-acetaminophen         (MIDRIN) -325 MG per capsule                              ALL THE ABOVE PROBLEMS ARE STABLE AND MED CHANGES AS NOTED        Diet:  MEDITERRANEAN DIET         Exercise:  NECK AND SHOULDER EXERCISES UP TO DATE INFORMATION GIVEN REFERENCES GIVEN   Exercises Range of motion, balance, isometric, and strengthening exercises 30 repetitions twice daily of involved joints            .GAYLA VEGA MD 5/11/2018 6:58 PM  May 11, 2018                      I

## 2018-05-11 NOTE — PATIENT INSTRUCTIONS
(M54.2) Neck pain  (primary encounter diagnosis)  Comment: CERVICAL DISC DZ   Plan: ORTHO  REFERRAL             (M81.0) Age related osteoporosis, unspecified pathological fracture presence  Comment:    Plan: lidocaine (XYLOCAINE) 5 % ointment             (E55.9) Vitamin D insufficiency  Comment:    Plan: acetaminophen (MAPAP) 325 MG tablet             (M25.512) Acute pain of left shoulder  Comment: NECK SHOULDER AND CHEST WALL LEFT SIDED X 3 WEEKS   Plan: ORTHO  REFERRAL             (R07.82) Intercostal pain  Comment:    Plan: isometheptene-dichloralphenazone-acetaminophen         (MIDRIN) -325 MG per capsule             (G44.219) Episodic tension-type headache, not intractable  Comment:    Plan: isometheptene-dichloralphenazone-acetaminophen         (MIDRIN) -325 MG per capsule             HEADACHES   Plan: isometheptene-dichloralphenazone-acetaminophen         (MIDRIN) -325 MG per capsule

## 2018-05-11 NOTE — MR AVS SNAPSHOT
After Visit Summary   5/11/2018    Rody Gonzalez    MRN: [id number]           Patient Information     Date Of Birth          1940        Visit Information        Provider Department      5/11/2018 3:45 PM Osman Yen MD; [employer] [facility]        Today's Diagnoses     Neck pain    -  1    Age related osteoporosis, unspecified pathological fracture presence        Vitamin D insufficiency        Acute pain of left shoulder        Intercostal pain        Episodic tension-type headache, not intractable        Dizziness          Care Instructions    (M54.2) Neck pain  (primary encounter diagnosis)  Comment: CERVICAL DISC DZ   Plan: ORTHO  REFERRAL             (M81.0) Age related osteoporosis, unspecified pathological fracture presence  Comment:    Plan: lidocaine (XYLOCAINE) 5 % ointment             (E55.9) Vitamin D insufficiency  Comment:    Plan: acetaminophen (MAPAP) 325 MG tablet             (M25.512) Acute pain of left shoulder  Comment: NECK SHOULDER AND CHEST WALL LEFT SIDED X 3 WEEKS   Plan: ORTHO  REFERRAL             (R07.82) Intercostal pain  Comment:    Plan: isometheptene-dichloralphenazone-acetaminophen         (MIDRIN) -325 MG per capsule             (G44.219) Episodic tension-type headache, not intractable  Comment:    Plan: isometheptene-dichloralphenazone-acetaminophen         (MIDRIN) -325 MG per capsule             (R42) Dizziness  Comment:    Plan: isometheptene-dichloralphenazone-acetaminophen         (MIDRIN) -325 MG per capsule                            Follow-ups after your visit        Additional Services     ORTHO  REFERRAL       [facility] is referring you to the Orthopedic  Services at [facility].       The  Representative will assist you in the coordination of your Orthopedic and Musculoskeletal Care  "as prescribed by your physician.    The  Representative will call you within 1 business day to help schedule your appointment, or you may contact the [facility] Representative at:    All areas ~ [phone]     Type of Referral : Non Surgical       Timeframe requested: [phone] PLEASE   WITHIN ONE WEEK     Coverage of these services is subject to the terms and limitations of your health insurance plan.  Please call member services at your health plan with any benefit or coverage questions.      If X-rays, CT or MRI's have been performed, please contact the facility where they were done to arrange for , prior to your scheduled appointment.  Please bring this referral request to your appointment and present it to your specialist.                  Who to contact     If you have questions or need follow up information about today's clinic visit or your schedule please contact [facility] directly at [phone].  Normal or non-critical lab and imaging results will be communicated to you by MyChart, letter or phone within 4 business days after the clinic has received the results. If you do not hear from us within 7 days, please contact the clinic through [facility] [employer] or phone. If you have a critical or abnormal lab result, we will notify you by phone as soon as possible.  Submit refill requests through [employer] or call your pharmacy and they will forward the refill request to us. Please allow 3 business days for your refill to be completed.          Additional Information About Your Visit        [employer] Information     [employer] lets you send messages to your doctor, view your test results, renew your prescriptions, schedule appointments and more. To sign up, go to [email or web address] . Click on \"Log in\" on the left side of the screen, which will take you to the Welcome page. Then click on \"Sign up Now\" on the right side of the page.     You will " be asked to enter the access code listed below, as well as some personal information. Please follow the directions to create your username and password.     Your access code is: [email or web address]  Expires: 2018  3:08 PM     Your access code will  in 90 days. If you need help or a new code, please call your [facility] clinic or [phone].        Care EveryWhere ID     This is your Care EveryWhere ID. This could be used by other organizations to access your [facility] medical records  [id number]        Your Vitals Were     Pulse Temperature Respirations Last Period Pulse Oximetry BMI (Body Mass Index)    60 98.4  F (36.9  C) (Tympanic) 16 (LMP Unknown) 98% 27.55 kg/m2       Blood Pressure from Last 3 Encounters:   18 128/78   18 138/72   18 128/64    Weight from Last 3 Encounters:   18 163 lb (73.9 kg)   18 161 lb (73 kg)   18 160 lb (72.6 kg)              We Performed the Following     ORTHO  REFERRAL          Where to get your medicines      These medications were sent to [facility] [address] [place] [address] AT [address] [facility], [address]  [address] [place] [address]     Phone:  [phone]     acetaminophen 325 MG tablet    lidocaine 5 % ointment         Some of these will need a paper prescription and others can be bought over the counter.  Ask your nurse if you have questions.     Bring a paper prescription for each of these medications     isometheptene-dichloralphenazone-acetaminophen -325 MG per capsule          Primary Care Provider Office Phone # Fax #    Osman Yen [phone] [address] [facility] [address]        Equal Access to Services     [facility] DERICK AH: Jackie buttso Sochris, waaxda luqadaha, qaybta kaalmada britni, everton rivera. So [facility] [phone].    ATENCIÓN: Si sabino ponce a hdez disposición  servicios gratuitos de asistencia lingüística. Rae madrigal [phone].    We comply with applicable federal civil rights laws and [place] laws. We do not discriminate on the basis of race, color, national origin, age, disability, sex, sexual orientation, or gender identity.            Thank you!     Thank you for choosing [facility]  for your care. Our goal is always to provide you with excellent care. Hearing back from our patients is one way we can continue to improve our services. Please take a few minutes to complete the written survey that you may receive in the mail after your visit with us. Thank you!             Your Updated Medication List - Protect others around you: Learn how to safely use, store and throw away your medicines at www.disposemymeds.org.          This list is accurate as of 5/11/18  4:27 PM.  Always use your most recent med list.                   Brand Name Dispense Instructions for use Diagnosis    acetaminophen 325 MG tablet    MAPAP    180 tablet    Take 1-2 tablets (325-650 mg) by mouth every 6 hours as needed for mild pain    Vitamin D insufficiency       alum & mag hydroxide-simethicone 400-400-40 MG/5ML Susp suspension    MYLANTA ES/MAALOX  ES    1 Bottle    Take 30 mLs by mouth 4 times daily as needed for indigestion    Gastroesophageal reflux disease without esophagitis       aspirin 81 MG chewable tablet     108 tablet    Take 1 tablet (81 mg) by mouth daily    Hyperlipidemia LDL goal <160       atorvastatin 10 MG tablet    LIPITOR    30 tablet    Take 1 tablet (10 mg) by mouth daily    Hyperlipidemia LDL goal <160       chlorhexidine 0.12 % solution    PERIDEX    120 mL    Swish and spit 15 mLs in mouth 2 times daily    Gingival bleeding       fluticasone 50 MCG/ACT spray    FLONASE    16 g    Spray 1 spray into both nostrils daily    Chronic seasonal allergic rhinitis due to pollen       glucosamine-chondroitinoitin 500-400 MG Tabs    [facility]  GLUCOSAMINE-CHONDROITIN    120 tablet    Take 2 tablets by mouth 2 times daily    Primary osteoarthritis of both knees       hydrocortisone 1 % cream    CORTAID    30 g    Apply sparingly to affected area three times daily for 14 days.    External hemorrhoids       hydrocortisone 2.5 % cream    ANUSOL-HC    30 g    Place rectally 2 times daily    External hemorrhoids       isometheptene-dichloralphenazone-acetaminophen -325 MG per capsule    MIDRIN    60 capsule    Take 1 capsule by mouth every 4 hours as needed for headaches    Intercostal pain, Episodic tension-type headache, not intractable, Dizziness       lidocaine 5 % ointment    XYLOCAINE    150 g    One application 4 x daily to affected areas lower back and knees if necessary Profile Rx: patient will contact pharmacy when needed    Age related osteoporosis, unspecified pathological fracture presence       loratadine 10 MG tablet    QC LORATADINE ALLERGY RELIEF    30 tablet    Take 1 tablet (10 mg) by mouth daily as needed    Chronic seasonal allergic rhinitis, unspecified trigger       metoprolol succinate 25 MG 24 hr tablet    TOPROL-XL    45 tablet    Take 0.5 tablets (12.5 mg) by mouth At Bedtime    Episodic tension-type headache, not intractable, Essential hypertension, benign       omeprazole 20 MG tablet    KLS OMPERAZOLE    30 tablet    Take 1 tablet (20 mg) by mouth daily    Gastroesophageal reflux disease without esophagitis       * order for DME     1 each    Cane of any materials of adujustable priya *one  Use as directed*    Tendency to fall       * order for DME     1 each    One cane of any material  One As directed adjustible if necessary    Primary osteoarthritis of both knees, Tendency to fall       polyethylene glycol powder    MIRALAX    510 g    Take 17 g (1 capful) by mouth daily    Slow transit constipation       senna-docusate 8.6-50 MG per tablet    SENOKOT-S;PERICOLACE    120 tablet    Take 1-2 tablets by mouth 2 times daily     Slow transit constipation       Vitamin D3 2000 units Tabs     60 tablet    Take 2 tablets by mouth daily (with breakfast)    Vitamin D insufficiency       * Notice:  This list has 2 medication(s) that are the same as other medications prescribed for you. Read the directions carefully, and ask your doctor or other care provider to review them with you.

## 2018-05-11 NOTE — LETTER
"May 15, 2018      Rody Gonzalez  [address]   [facility] [address]        Dear ,    We are writing to inform you of your test results.    NORMAL COMPLETE BLOOD PANEL WBCS RBCS AND PLTS   NORMAL ERTHROCYTE SEDIMENTATION RATE IE INFLAMMATORY MARKER   NORMAL LDL OR \"BAD\" CHOLESTEROL   NORMAL GLUCOSE, RENAL AND BLOOD SALTS     BORDERLINE  HIGH CALCIUM LEVEL   NORMAL LIVER FUNCTION TESTS   NORMAL LDL OR \"BAD\" CHOLESTEROL   NORMAL ERTHROCYTE SEDIMENTATION RATE IE INFLAMMATORY MARKER   NORMAL COMPLETE BLOOD PANEL WBCS RBCS AND PLATELETS      Resulted Orders   Comprehensive metabolic panel   Result Value Ref Range    Sodium 138 133 - 144 mmol/L    Potassium 5.2 3.4 - 5.3 mmol/L    Chloride 108 94 - 109 mmol/L    Carbon Dioxide 26 20 - 32 mmol/L    Anion Gap 4 3 - 14 mmol/L    Glucose 81 70 - 99 mg/dL    Urea Nitrogen 13 7 - 30 mg/dL    Creatinine 0.71 0.52 - 1.04 mg/dL    GFR Estimate 79 >60 mL/min/1.7m2      Comment:      Non  GFR Calc    GFR Estimate If Black >90 >60 mL/min/1.7m2      Comment:       GFR Calc    Calcium 10.7 (H) 8.5 - 10.1 mg/dL    Bilirubin Total 0.3 0.2 - 1.3 mg/dL    Albumin 3.9 3.4 - 5.0 g/dL    Protein Total 8.1 6.8 - 8.8 g/dL    Alkaline Phosphatase 73 40 - 150 U/L    ALT 33 0 - 50 U/L    AST 35 0 - 45 U/L   CBC with platelets   Result Value Ref Range    WBC 5.4 4.0 - 11.0 10e9/L    RBC Count 4.35 3.8 - 5.2 10e12/L    Hemoglobin 13.0 11.7 - 15.7 g/dL    Hematocrit 39.4 35.0 - 47.0 %    MCV 91 78 - 100 fl    MCH 29.9 26.5 - 33.0 pg    MCHC 33.0 31.5 - 36.5 g/dL    RDW 13.2 10.0 - 15.0 %    Platelet Count 171 150 - 450 10e9/L   Erythrocyte sedimentation rate auto   Result Value Ref Range    Sed Rate 19 0 - 30 mm/h   LDL cholesterol direct   Result Value Ref Range    LDL Cholesterol Direct 98 <100 mg/dL      Comment:      Desirable:       <100 mg/dl       If you have any questions or concerns, please call the clinic at the number listed above. "       Sincerely,        GAYLA VEGA MD

## 2018-05-12 ASSESSMENT — PATIENT HEALTH QUESTIONNAIRE - PHQ9: SUM OF ALL RESPONSES TO PHQ QUESTIONS 1-9: 1

## 2018-05-14 LAB
ALBUMIN SERPL-MCNC: 3.9 G/DL (ref 3.4–5)
ALP SERPL-CCNC: 73 U/L (ref 40–150)
ALT SERPL W P-5'-P-CCNC: 33 U/L (ref 0–50)
ANION GAP SERPL CALCULATED.3IONS-SCNC: 4 MMOL/L (ref 3–14)
AST SERPL W P-5'-P-CCNC: 35 U/L (ref 0–45)
BILIRUB SERPL-MCNC: 0.3 MG/DL (ref 0.2–1.3)
BUN SERPL-MCNC: 13 MG/DL (ref 7–30)
CALCIUM SERPL-MCNC: 10.7 MG/DL (ref 8.5–10.1)
CHLORIDE SERPL-SCNC: 108 MMOL/L (ref 94–109)
CO2 SERPL-SCNC: 26 MMOL/L (ref 20–32)
CREAT SERPL-MCNC: 0.71 MG/DL (ref 0.52–1.04)
GFR SERPL CREATININE-BSD FRML MDRD: 79 ML/MIN/1.7M2
GLUCOSE SERPL-MCNC: 81 MG/DL (ref 70–99)
LDLC SERPL DIRECT ASSAY-MCNC: 98 MG/DL
POTASSIUM SERPL-SCNC: 5.2 MMOL/L (ref 3.4–5.3)
PROT SERPL-MCNC: 8.1 G/DL (ref 6.8–8.8)
SODIUM SERPL-SCNC: 138 MMOL/L (ref 133–144)

## 2018-05-16 ENCOUNTER — TELEPHONE (OUTPATIENT)
Dept: FAMILY MEDICINE | Facility: CLINIC | Age: 78
End: 2018-05-16

## 2018-05-16 NOTE — TELEPHONE ENCOUNTER
Our goal is to have forms completed within 72 hours, however some forms may require a visit or additional information.    What clinic location was the form placed at [facility] or [place].?     Who is the form from? [facility]   Where did the form come from? Faxed to clinic   The form was placed in the inbox of Osman Yen Jr MD      Please fax to [phone]    Additional comments:     Call take on 5/16/2018 at 2:04 PM by Raysa Abebe

## 2018-05-29 ENCOUNTER — TELEPHONE (OUTPATIENT)
Dept: FAMILY MEDICINE | Facility: CLINIC | Age: 78
End: 2018-05-29

## 2018-05-29 DIAGNOSIS — G44.219 EPISODIC TENSION-TYPE HEADACHE, NOT INTRACTABLE: Primary | ICD-10-CM

## 2018-05-29 NOTE — TELEPHONE ENCOUNTER
isometheptene-dichloralphenazone-acetaminophen (MIDRIN) -325 MG per capsule  Is not covered by insurance please send an alternative

## 2018-08-25 DIAGNOSIS — K21.9 GASTROESOPHAGEAL REFLUX DISEASE WITHOUT ESOPHAGITIS: Chronic | ICD-10-CM

## 2018-08-27 NOTE — TELEPHONE ENCOUNTER
"Requested Prescriptions   Pending Prescriptions Disp Refills     omeprazole (PRILOSEC) 20 MG CR capsule [Pharmacy Med Name: OMEPRAZOLE 20MG CAPSULE DR] 30 capsule 11      Last Written Prescription Date:  4/3/18  Last Fill Quantity: 30,  # refills: 11   Last office visit: 5/11/2018 with prescribing provider:     Future Office Visit:   Next 5 appointments (look out 90 days)     Aug 31, 2018  2:00 PM CDT   Office Visit with Osman Yen MD, [facility] [employer] [employer] [facility]   [facility]    [address] [place] [id number] [phone]                  Sig: TAKE 1 TABLET (20 MG) BY MOUTH DAILY    PPI Protocol Failed    8/25/2018 12:37 PM       Failed - No diagnosis of osteoporosis on record       Passed - Not on Clopidogrel (unless Pantoprazole ordered)       Passed - Recent (12 mo) or future (30 days) visit within the authorizing provider's specialty    Patient had office visit in the last 12 months or has a visit in the next 30 days with authorizing provider or within the authorizing provider's specialty.  See \"Patient Info\" tab in inbasket, or \"Choose Columns\" in Meds & Orders section of the refill encounter.           Passed - Patient is age 18 or older       Passed - No active pregnacy on record       Passed - No positive pregnancy test in past 12 months          "

## 2018-08-31 ENCOUNTER — PATIENT OUTREACH (OUTPATIENT)
Dept: GERIATRIC MEDICINE | Facility: CLINIC | Age: 78
End: 2018-08-31

## 2018-08-31 NOTE — PROGRESS NOTES
[facility] Care Coordination Contact    [facility] Six-Month Telephone Assessment    6 month telephone assessment completed on 8/1/18  .    ER visits: No  Hospitalizations: No  TCU stays: No  Significant health status changes: N/A  Falls/Injuries: No  ADL/IADL changes: No  Changes in services: No    Caregiver Assessment follow up:  n/a    Goals: See POC in chart for goal progress documentation.      Will see member in 6 months for an annual health risk assessment.   Encouraged member to call CC with any questions or concerns in the meantime.     Johnna Malin RN BSN PHN  [facility]   RN Care Coordinator   Phone:   [phone]  Fax:       [phone]

## 2018-10-05 ENCOUNTER — OFFICE VISIT (OUTPATIENT)
Dept: FAMILY MEDICINE | Facility: CLINIC | Age: 78
End: 2018-10-05
Payer: COMMERCIAL

## 2018-10-05 VITALS
WEIGHT: 156 LBS | OXYGEN SATURATION: 98 % | DIASTOLIC BLOOD PRESSURE: 74 MMHG | RESPIRATION RATE: 16 BRPM | TEMPERATURE: 98.5 F | BODY MASS INDEX: 26.36 KG/M2 | HEART RATE: 58 BPM | SYSTOLIC BLOOD PRESSURE: 148 MMHG

## 2018-10-05 DIAGNOSIS — K59.04 CHRONIC IDIOPATHIC CONSTIPATION: Primary | ICD-10-CM

## 2018-10-05 DIAGNOSIS — E55.9 VITAMIN D INSUFFICIENCY: ICD-10-CM

## 2018-10-05 DIAGNOSIS — K64.4 EXTERNAL HEMORRHOIDS: ICD-10-CM

## 2018-10-05 DIAGNOSIS — E78.5 HYPERLIPIDEMIA LDL GOAL <160: Chronic | ICD-10-CM

## 2018-10-05 DIAGNOSIS — J30.1 SEASONAL ALLERGIC RHINITIS DUE TO POLLEN: ICD-10-CM

## 2018-10-05 DIAGNOSIS — F32.5 MAJOR DEPRESSION IN COMPLETE REMISSION (H): ICD-10-CM

## 2018-10-05 DIAGNOSIS — J30.1 CHRONIC SEASONAL ALLERGIC RHINITIS DUE TO POLLEN: ICD-10-CM

## 2018-10-05 DIAGNOSIS — K21.9 GASTROESOPHAGEAL REFLUX DISEASE WITHOUT ESOPHAGITIS: Chronic | ICD-10-CM

## 2018-10-05 DIAGNOSIS — M17.0 PRIMARY OSTEOARTHRITIS OF BOTH KNEES: Chronic | ICD-10-CM

## 2018-10-05 DIAGNOSIS — M81.0 AGE RELATED OSTEOPOROSIS, UNSPECIFIED PATHOLOGICAL FRACTURE PRESENCE: ICD-10-CM

## 2018-10-05 PROCEDURE — 99214 OFFICE O/P EST MOD 30 MIN: CPT | Performed by: FAMILY MEDICINE

## 2018-10-05 RX ORDER — ATORVASTATIN CALCIUM 10 MG/1
10 TABLET, FILM COATED ORAL DAILY
Qty: 30 TABLET | Refills: 11 | Status: SHIPPED | OUTPATIENT
Start: 2018-10-05 | End: 2019-04-18

## 2018-10-05 RX ORDER — BENZOCAINE/MENTHOL 6 MG-10 MG
LOZENGE MUCOUS MEMBRANE
Qty: 30 G | Refills: 5 | Status: SHIPPED | OUTPATIENT
Start: 2018-10-05 | End: 2019-04-18

## 2018-10-05 RX ORDER — ACETAMINOPHEN 325 MG/1
325-650 TABLET ORAL EVERY 6 HOURS PRN
Qty: 100 TABLET | Refills: 11 | Status: SHIPPED | OUTPATIENT
Start: 2018-10-05 | End: 2019-04-18

## 2018-10-05 RX ORDER — POLYETHYLENE GLYCOL 3350 17 G/17G
1 POWDER, FOR SOLUTION ORAL DAILY
Qty: 510 G | Refills: 11 | Status: SHIPPED | OUTPATIENT
Start: 2018-10-05 | End: 2019-04-18

## 2018-10-05 RX ORDER — LORATADINE 10 MG/1
10 TABLET ORAL DAILY PRN
Qty: 30 TABLET | Refills: 11 | Status: SHIPPED | OUTPATIENT
Start: 2018-10-05 | End: 2019-04-18

## 2018-10-05 RX ORDER — LIDOCAINE 50 MG/G
OINTMENT TOPICAL
Qty: 150 G | Refills: 3 | Status: SHIPPED | OUTPATIENT
Start: 2018-10-05 | End: 2019-04-18

## 2018-10-05 RX ORDER — CHOLECALCIFEROL (VITAMIN D3) 50 MCG
2 TABLET ORAL
Qty: 60 TABLET | Refills: 11 | Status: SHIPPED | OUTPATIENT
Start: 2018-10-05 | End: 2019-04-18

## 2018-10-05 RX ORDER — RIBOFLAVIN (VITAMIN B2) 100 MG
2 TABLET ORAL 2 TIMES DAILY
Qty: 120 TABLET | Refills: 11 | Status: SHIPPED | OUTPATIENT
Start: 2018-10-05 | End: 2019-04-18

## 2018-10-05 RX ORDER — FLUTICASONE PROPIONATE 50 MCG
1 SPRAY, SUSPENSION (ML) NASAL DAILY
Qty: 16 G | Refills: 11 | Status: SHIPPED | OUTPATIENT
Start: 2018-10-05 | End: 2019-04-18

## 2018-10-05 NOTE — MR AVS SNAPSHOT
After Visit Summary   10/5/2018    Rody Gonzalez    MRN: [id number]           Patient Information     Date Of Birth          1940        Visit Information        Provider Department      10/5/2018 3:30 PM Osman Yen MD; [employer] [facility]        Today's Diagnoses     Chronic idiopathic constipation    -  1    Hyperlipidemia LDL goal <160        Vitamin D insufficiency        Chronic seasonal allergic rhinitis due to pollen        Primary osteoarthritis of both knees        External hemorrhoids        Age related osteoporosis, unspecified pathological fracture presence        Gastroesophageal reflux disease without esophagitis        Seasonal allergic rhinitis due to pollen          Care Instructions    (K59.04) Chronic idiopathic constipation  (primary encounter diagnosis)  Comment:    Plan: linaclotide (LINZESS) 145 MCG capsule,  One caps         COLORECTAL SURGERY REFERRAL, polyethylene         glycol (MIRALAX) powder          1 scoop twice daily         (E78.5) Hyperlipidemia LDL goal <160  Comment:    Plan: atorvastatin (LIPITOR) 10 MG tablet             (E55.9) Vitamin D insufficiency  Comment:    Plan: Cholecalciferol (VITAMIN D3) 2000 units TABS             (J30.1) Chronic seasonal allergic rhinitis due to pollen  Comment:    Plan: fluticasone (FLONASE) 50 MCG/ACT spray             (M17.0) Primary osteoarthritis of both knees  Comment:    Plan: glucosamine-chondroitinoitin ([facility]         GLUCOSAMINE-CHONDROITIN) 500-400 MG TABS,         acetaminophen (TYLENOL) 325 MG tablet             (K64.4) External hemorrhoids  Comment:    Plan: hydrocortisone (ANUSOL-HC) 2.5 % cream,         hydrocortisone (CORTAID) 1 % cream             (M81.0) Age related osteoporosis, unspecified pathological fracture presence  Comment:    Plan: lidocaine (XYLOCAINE) 5 % ointment             (K21.9) Gastroesophageal reflux disease without  esophagitis  Comment:    Plan: omeprazole (PRILOSEC) 20 MG CR capsule             (J30.1) Seasonal allergic rhinitis due to pollen  Comment:    Plan: loratadine (QC LORATADINE ALLERGY RELIEF) 10 MG        tablet                                Follow-ups after your visit        Additional Services     COLORECTAL SURGERY REFERRAL       Your provider has referred you to: [facility] [phone]   [email or web address]    Referral Reason(s): Hemorrhoids  Special Concerns: None  This referral is: Elective (week +)  It is not OK to leave a message on patient's voicemail.  Central African speaker [phone] daughter Eve  To arrange    Please be aware that coverage of these services is subject to the terms and limitations of your health insurance plan.  Call member services at your health plan with any benefit or coverage questions.      Please bring the following with you to your appointment:    (1) Any X-Rays, CTs or MRIs which have been performed.  Contact the facility where they were done to arrange for  prior to your scheduled appointment.    (2) List of current medications  (3) This referral request   (4) Any documents/labs given to you for this referral                  Follow-up notes from your care team     Return in about 2 months (around 12/5/2018).      Who to contact     If you have questions or need follow up information about today's clinic visit or your schedule please contact [facility] directly at [phone].  Normal or non-critical lab and imaging results will be communicated to you by MyChart, letter or phone within 4 business days after the clinic has received the results. If you do not hear from us within 7 days, please contact the clinic through MyChart or phone. If you have a critical or abnormal lab result, we will notify you by phone as soon as possible.  Submit refill requests through [facility] or call your  "pharmacy and they will forward the refill request to us. Please allow 3 business days for your refill to be completed.          Additional Information About Your Visit        [email or web address] Information     [employer]co volante lets you send messages to your doctor, view your test results, renew your prescriptions, schedule appointments and more. To sign up, go to [email or web address] . Click on \"Log in\" on the left side of the screen, which will take you to the Welcome page. Then click on \"Sign up Now\" on the right side of the page.     You will be asked to enter the access code listed below, as well as some personal information. Please follow the directions to create your username and password.     Your access code is: [email or web address]  Expires: 1/3/2019  4:24 PM     Your access code will  in 90 days. If you need help or a new code, please call your [facility] clinic or [phone].        Care EveryWhere ID     This is your Care EveryWhere ID. This could be used by other organizations to access your [facility] medical records  [id number]        Your Vitals Were     Pulse Temperature Respirations Last Period Pulse Oximetry BMI (Body Mass Index)    58 98.5  F (36.9  C) (Tympanic) 16 (LMP Unknown) 98% 26.36 kg/m2       Blood Pressure from Last 3 Encounters:   10/05/18 148/74   18 128/78   18 138/72    Weight from Last 3 Encounters:   10/05/18 156 lb (70.8 kg)   18 163 lb (73.9 kg)   18 161 lb (73 kg)              We Performed the Following     COLORECTAL SURGERY REFERRAL     DEPRESSION ACTION PLAN (DAP)     PAF COMPLETED          Today's Medication Changes          These changes are accurate as of 10/5/18  4:29 PM.  If you have any questions, ask your nurse or doctor.               Start taking these medicines.        Dose/Directions    linaclotide 145 MCG capsule   Commonly known as:  LINZESS   Used for:  Chronic idiopathic constipation   Started by:  Osman Yen MD        Dose:  145 mcg   Take " 1 capsule (145 mcg) by mouth every morning (before breakfast)   Quantity:  30 capsule   Refills:  11         Stop taking these medicines if you haven't already. Please contact your care team if you have questions.     senna-docusate 8.6-50 MG per tablet   Commonly known as:  SENOKOT-S;PERICOLACE   Stopped by:  Osman Yen MD                Where to get your medicines      These medications were sent to [id number] - [address] AT [address] [place] [address], [address]  [address] [place] [id number]     Phone:  [phone]     acetaminophen 325 MG tablet    atorvastatin 10 MG tablet    fluticasone 50 MCG/ACT spray    glucosamine-chondroitinoitin 500-400 MG Tabs    hydrocortisone 1 % cream    hydrocortisone 2.5 % cream    lidocaine 5 % ointment    linaclotide 145 MCG capsule    loratadine 10 MG tablet    omeprazole 20 MG CR capsule    polyethylene glycol powder    Vitamin D3 2000 units Tabs                Primary Care Provider Office Phone # Fax #    Osman Yen [phone] [address] [facility] [address]        Equal Access to Services     [facility]HAEL AH: Hadii aad ku hadasho Soomaali, waaxda luqadaha, qaybta kaalmada adeegyada, waxay ednain conorn gabriel rivera. So [facility] [phone].    ATENCIÓN: Si habla español, tiene a hdez disposición servicios gratLovelace Rehabilitation Hospitalos de asistencia lingüística. Bandarame al [phone].    We comply with applicable federal civil rights laws and [place] laws. We do not discriminate on the basis of race, color, national origin, age, disability, sex, sexual orientation, or gender identity.            Thank you!     Thank you for choosing [facility]  for your care. Our goal is always to provide you with excellent care. Hearing back from our patients is one way we can continue to improve our services. Please take a few minutes to complete the  written survey that you may receive in the mail after your visit with us. Thank you!             Your Updated Medication List - Protect others around you: Learn how to safely use, store and throw away your medicines at www.[email or web address].org.          This list is accurate as of 10/5/18  4:29 PM.  Always use your most recent med list.                   Brand Name Dispense Instructions for use Diagnosis    acetaminophen 325 MG tablet    TYLENOL    100 tablet    Take 1-2 tablets (325-650 mg) by mouth every 6 hours as needed for mild pain    Primary osteoarthritis of both knees       alum & mag hydroxide-simethicone 400-400-40 MG/5ML Susp suspension    MYLANTA ES/MAALOX  ES    1 Bottle    Take 30 mLs by mouth 4 times daily as needed for indigestion    Gastroesophageal reflux disease without esophagitis       aspirin 81 MG chewable tablet     108 tablet    Take 1 tablet (81 mg) by mouth daily    Hyperlipidemia LDL goal <160       aspirin-acetaminophen-caffeine 250-250-65 MG per tablet    EXCEDRIN MIGRAINE    80 tablet    Take 1 tablet by mouth every 6 hours as needed for headaches    Episodic tension-type headache, not intractable       atorvastatin 10 MG tablet    LIPITOR    30 tablet    Take 1 tablet (10 mg) by mouth daily    Hyperlipidemia LDL goal <160       chlorhexidine 0.12 % solution    PERIDEX    120 mL    Swish and spit 15 mLs in mouth 2 times daily    Gingival bleeding       fluticasone 50 MCG/ACT spray    FLONASE    16 g    Spray 1 spray into both nostrils daily    Chronic seasonal allergic rhinitis due to pollen       glucosamine-chondroitinoitin 500-400 MG Tabs    [facility] GLUCOSAMINE-CHONDROITIN    120 tablet    Take 2 tablets by mouth 2 times daily    Primary osteoarthritis of both knees       hydrocortisone 1 % cream    CORTAID    30 g    Apply sparingly to affected area three times daily for 14 days.    External hemorrhoids       hydrocortisone 2.5 % cream    ANUSOL-HC    30 g    Place rectally 2 times daily     External hemorrhoids       lidocaine 5 % ointment    XYLOCAINE    150 g    One application 4 x daily to affected areas lower back and knees if necessary Profile Rx: patient will contact pharmacy when needed    Age related osteoporosis, unspecified pathological fracture presence       linaclotide 145 MCG capsule    LINZESS    30 capsule    Take 1 capsule (145 mcg) by mouth every morning (before breakfast)    Chronic idiopathic constipation       loratadine 10 MG tablet    QC LORATADINE ALLERGY RELIEF    30 tablet    Take 1 tablet (10 mg) by mouth daily as needed    Seasonal allergic rhinitis due to pollen       metoprolol succinate 25 MG 24 hr tablet    TOPROL-XL    45 tablet    Take 0.5 tablets (12.5 mg) by mouth At Bedtime    Episodic tension-type headache, not intractable, Essential hypertension, benign       * omeprazole 20 MG tablet    KLS OMPERAZOLE    30 tablet    Take 1 tablet (20 mg) by mouth daily    Gastroesophageal reflux disease without esophagitis       * omeprazole 20 MG CR capsule    priLOSEC    90 capsule    TAKE 1 TABLET (20 MG) BY MOUTH DAILY    Gastroesophageal reflux disease without esophagitis       * order for DME     1 each    Cane of any materials of adujustable priya *one  Use as directed*    Tendency to fall       * order for DME     1 each    One cane of any material  One As directed adjustible if necessary    Primary osteoarthritis of both knees, Tendency to fall       polyethylene glycol powder    MIRALAX    510 g    Take 17 g (1 capful) by mouth daily    Chronic idiopathic constipation       Vitamin D3 2000 units Tabs     60 tablet    Take 2 tablets by mouth daily (with breakfast)    Vitamin D insufficiency       * Notice:  This list has 4 medication(s) that are the same as other medications prescribed for you. Read the directions carefully, and ask your doctor or other care provider to review them with you.

## 2018-10-05 NOTE — PATIENT INSTRUCTIONS
(K59.04) Chronic idiopathic constipation  (primary encounter diagnosis)  Comment:    Plan: linaclotide (LINZESS) 145 MCG capsule,  One caps         COLORECTAL SURGERY REFERRAL, polyethylene         glycol (MIRALAX) powder          1 scoop twice daily         (E78.5) Hyperlipidemia LDL goal <160  Comment:    Plan: atorvastatin (LIPITOR) 10 MG tablet             (E55.9) Vitamin D insufficiency  Comment:    Plan: Cholecalciferol (VITAMIN D3) 2000 units TABS             (J30.1) Chronic seasonal allergic rhinitis due to pollen  Comment:    Plan: fluticasone (FLONASE) 50 MCG/ACT spray             (M17.0) Primary osteoarthritis of both knees  Comment:    Plan: glucosamine-chondroitinoitin ([facility]         GLUCOSAMINE-CHONDROITIN) 500-400 MG TABS,         acetaminophen (TYLENOL) 325 MG tablet             (K64.4) External hemorrhoids  Comment:    Plan: hydrocortisone (ANUSOL-HC) 2.5 % cream,         hydrocortisone (CORTAID) 1 % cream             (M81.0) Age related osteoporosis, unspecified pathological fracture presence  Comment:    Plan: lidocaine (XYLOCAINE) 5 % ointment             (K21.9) Gastroesophageal reflux disease without esophagitis  Comment:    Plan: omeprazole (PRILOSEC) 20 MG CR capsule             (J30.1) Seasonal allergic rhinitis due to pollen  Comment:    Plan: loratadine (QC LORATADINE ALLERGY RELIEF) 10 MG        tablet

## 2018-10-05 NOTE — PROGRESS NOTES
SUBJECTIVE:   Rody Gonzalez is a 78 year old female who presents to clinic today for the following health issues:      Constipation      Duration: long time    Description:       Frequency of bowel movements: unsure, not often       Consistency of stool: hard, small pieces    Intensity:  severe    Accompanying signs and symptoms: noticed blood, hemorhhoids       Abdominal pain: YES       Rectal pain: YES, hemorrhoid       Blood in stool: YES       Nausea/vomitting: no     History:        Similar problems in past: YES    Precipitating or alleviating factors: miralax not helping       Medications worsening symptoms: no     Therapies tried and outcome: stool softeners not helping       Chronic laxative use: YES- maybe, unsure    has Health Care Home; Gastroesophageal reflux disease without esophagitis; DDD (degenerative disc disease), lumbar; Dysuria; Anxiety state; Urinary frequency; Tuberculosis of peripheral lymph nodes; Nonspecific abnormal results of thyroid function study; Pulmonary tuberculosis; PPD positive; Papanicolaou smear of cervix with atypical squamous cells of undetermined significance (ASC-US); Osteoporosis; Tear film insufficiency; Memory loss;     GASTROESOPHAGEAL REFLUX DISEASE WITHOUT ESOPHAGITIS STALE AND IMPROVED    LUMBAR DISC DISEASE     HISTORY OF DYSURIA IN REMISSION     ANXIETY AND DEPRESSION     HISTORY OF EXTRAPULMONARY TUBERCULOSIS  NODES     OVER WEIGHT     OSTEOARTHRITIS KNEES     HYPERTENSION WITH GOAL OF LESS THAN 140/80     HISTORY OF WRIST FRACTURE ASYMPTOMATIC     ABNORMAL GAIT    HEARING LOSS     HISTORY OF BLADDER INFECTIONS                  Topic Date Due     TETANUS IMMUNIZATION (SYSTEM ASSIGNED)  09/01/2016               .  Current Outpatient Prescriptions   Medication Sig Dispense Refill     acetaminophen (TYLENOL) 325 MG tablet Take 1-2 tablets (325-650 mg) by mouth every 6 hours as needed for mild pain 100 tablet 11     alum & mag hydroxide-simethicone (MYLANTA ES/MAALOX   ES) 400-400-40 MG/5ML SUSP suspension Take 30 mLs by mouth 4 times daily as needed for indigestion 1 Bottle 11     aspirin 81 MG chewable tablet Take 1 tablet (81 mg) by mouth daily 108 tablet 3     aspirin-acetaminophen-caffeine (EXCEDRIN MIGRAINE) 250-250-65 MG per tablet Take 1 tablet by mouth every 6 hours as needed for headaches 80 tablet 1     atorvastatin (LIPITOR) 10 MG tablet Take 1 tablet (10 mg) by mouth daily 30 tablet 11     Cholecalciferol (VITAMIN D3) 2000 units TABS Take 2 tablets by mouth daily (with breakfast) 60 tablet 11     fluticasone (FLONASE) 50 MCG/ACT spray Spray 1 spray into both nostrils daily 16 g 11     glucosamine-chondroitinoitin ([facility]S GLUCOSAMINE-CHONDROITIN) 500-400 MG TABS Take 2 tablets by mouth 2 times daily 120 tablet 11     hydrocortisone (ANUSOL-HC) 2.5 % cream Place rectally 2 times daily 30 g 11     hydrocortisone (CORTAID) 1 % cream Apply sparingly to affected area three times daily for 14 days. 30 g 5     lidocaine (XYLOCAINE) 5 % ointment One application 4 x daily to affected areas lower back and knees if necessary Profile Rx: patient will contact pharmacy when needed 150 g 3     linaclotide (LINZESS) 145 MCG capsule Take 1 capsule (145 mcg) by mouth every morning (before breakfast) 30 capsule 11     loratadine (QC LORATADINE ALLERGY RELIEF) 10 MG tablet Take 1 tablet (10 mg) by mouth daily as needed 30 tablet 11     omeprazole (PRILOSEC) 20 MG CR capsule TAKE 1 TABLET (20 MG) BY MOUTH DAILY 90 capsule 3     order for DME One cane of any material   One  As directed  adjustible if necessary 1 each 0     order for DME Cane of any materials of adujustable priya  *one   Use as directed* 1 each 0     polyethylene glycol (MIRALAX) powder Take 17 g (1 capful) by mouth daily 510 g 11     chlorhexidine (CHLORHEXIDINE) 0.12 % solution Swish and spit 15 mLs in mouth 2 times daily (Patient not taking: Reported on 10/5/2018) 120 mL 3     metoprolol succinate (TOPROL-XL) 25 MG 24 hr  tablet Take 0.5 tablets (12.5 mg) by mouth At Bedtime (Patient not taking: Reported on 10/5/2018) 45 tablet 3     omeprazole (KLS OMPERAZOLE) 20 MG tablet Take 1 tablet (20 mg) by mouth daily (Patient not taking: Reported on 10/5/2018) 30 tablet 11              Allergies   Allergen Reactions     Isoniazid      Pyrazinamide            Immunization History   Administered Date(s) Administered     Pneumococcal 23 valent 2006     Tdap (Adacel,Boostrix) 2006               reports that she does not drink alcohol.          reports that she does not use illicit drugs.        family history includes Family History Negative in her father and mother. There is no history of Diabetes, Coronary Artery Disease, Hypertension, Hyperlipidemia, Cerebrovascular Disease, Breast Cancer, Colon Cancer, Prostate Cancer, Other Cancer, Depression, Anxiety Disorder, Mental Illness, Substance Abuse, Anesthesia Reaction, Asthma, Osteoporosis, Genetic Disorder, Thyroid Disease, Obesity, or Unknown/Adopted.        indicated that the status of her no family hx of is unknown. She indicated that her mother is . She indicated that her father is .          has a past surgical history that includes Hysterectomy ().         reports that she does not currently engage in sexual activity but has had male partners.;    .  Pediatric History   Patient Guardian Status     Not on file.     Other Topics Concern     Parent/Sibling W/ Cabg, Mi Or Angioplasty Before 65f 55m? No     Social History Narrative               reports that she has never smoked. She has never used smokeless tobacco.        Medical, social, surgical, and family histories reviewed.        Labs reviewed in EPIC  Patient Active Problem List   Diagnosis     Health Care Home     Gastroesophageal reflux disease without esophagitis     DDD (degenerative disc disease), lumbar     Dysuria     Anxiety state     Urinary frequency     Tuberculosis of peripheral lymph nodes      Nonspecific abnormal results of thyroid function study     Pulmonary tuberculosis     PPD positive     Papanicolaou smear of cervix with atypical squamous cells of undetermined significance (ASC-US)     Osteoporosis     Tear film insufficiency     Memory loss     Headache     Abdominal pain     Closed fracture of triquetral (cuneiform) bone of wrist     Other chronic pain     Abnormality of gait     Hearing loss     Malaise and fatigue     Acute cystitis     Major depression in complete remission (H)     Sensory hearing loss, unilateral     Vitamin D deficiency disease     Hyperlipidemia LDL goal <160     Overweight (BMI 25.0-29.9)     Risk for falls     ACP (advance care planning)     Primary osteoarthritis of both knees     Tension headache     History of bloody stools     Essential hypertension, benign       Past Surgical History:   Procedure Laterality Date     HYSTERECTOMY  2004         Social History   Substance Use Topics     Smoking status: Never Smoker     Smokeless tobacco: Never Used     Alcohol use No       Family History   Problem Relation Age of Onset     Family History Negative Mother      Family History Negative Father      Diabetes No family hx of      Coronary Artery Disease No family hx of      Hypertension No family hx of      Hyperlipidemia No family hx of      Cerebrovascular Disease No family hx of      Breast Cancer No family hx of      Colon Cancer No family hx of      Prostate Cancer No family hx of      Other Cancer No family hx of      Depression No family hx of      Anxiety Disorder No family hx of      Mental Illness No family hx of      Substance Abuse No family hx of      Anesthesia Reaction No family hx of      Asthma No family hx of      Osteoporosis No family hx of      Genetic Disorder No family hx of      Thyroid Disease No family hx of      Obesity No family hx of      Unknown/Adopted No family hx of              Current Outpatient Prescriptions   Medication Sig Dispense  Refill     acetaminophen (TYLENOL) 325 MG tablet Take 1-2 tablets (325-650 mg) by mouth every 6 hours as needed for mild pain 100 tablet 11     alum & mag hydroxide-simethicone (MYLANTA ES/MAALOX  ES) 400-400-40 MG/5ML SUSP suspension Take 30 mLs by mouth 4 times daily as needed for indigestion 1 Bottle 11     aspirin 81 MG chewable tablet Take 1 tablet (81 mg) by mouth daily 108 tablet 3     aspirin-acetaminophen-caffeine (EXCEDRIN MIGRAINE) 250-250-65 MG per tablet Take 1 tablet by mouth every 6 hours as needed for headaches 80 tablet 1     atorvastatin (LIPITOR) 10 MG tablet Take 1 tablet (10 mg) by mouth daily 30 tablet 11     Cholecalciferol (VITAMIN D3) 2000 units TABS Take 2 tablets by mouth daily (with breakfast) 60 tablet 11     fluticasone (FLONASE) 50 MCG/ACT spray Spray 1 spray into both nostrils daily 16 g 11     glucosamine-chondroitinoitin (CVS GLUCOSAMINE-CHONDROITIN) 500-400 MG TABS Take 2 tablets by mouth 2 times daily 120 tablet 11     hydrocortisone (ANUSOL-HC) 2.5 % cream Place rectally 2 times daily 30 g 11     hydrocortisone (CORTAID) 1 % cream Apply sparingly to affected area three times daily for 14 days. 30 g 5     lidocaine (XYLOCAINE) 5 % ointment One application 4 x daily to affected areas lower back and knees if necessary Profile Rx: patient will contact pharmacy when needed 150 g 3     linaclotide (LINZESS) 145 MCG capsule Take 1 capsule (145 mcg) by mouth every morning (before breakfast) 30 capsule 11     loratadine (QC LORATADINE ALLERGY RELIEF) 10 MG tablet Take 1 tablet (10 mg) by mouth daily as needed 30 tablet 11     omeprazole (PRILOSEC) 20 MG CR capsule TAKE 1 TABLET (20 MG) BY MOUTH DAILY 90 capsule 3     order for DME One cane of any material   One  As directed  adjustible if necessary 1 each 0     order for DME Cane of any materials of adujustable priya  *one   Use as directed* 1 each 0     polyethylene glycol (MIRALAX) powder Take 17 g (1 capful) by mouth daily 510 g 11      chlorhexidine (CHLORHEXIDINE) 0.12 % solution Swish and spit 15 mLs in mouth 2 times daily (Patient not taking: Reported on 10/5/2018) 120 mL 3     metoprolol succinate (TOPROL-XL) 25 MG 24 hr tablet Take 0.5 tablets (12.5 mg) by mouth At Bedtime (Patient not taking: Reported on 10/5/2018) 45 tablet 3     omeprazole (KLS OMPERAZOLE) 20 MG tablet Take 1 tablet (20 mg) by mouth daily (Patient not taking: Reported on 10/5/2018) 30 tablet 11           Recent Labs   Lab Test  05/11/18   1634  07/18/17   1605   04/13/16   0943  12/04/15   1530  04/10/15   1257  03/04/14   1050  09/13/13   1125   LDL  98   --    --   126*  125*  116  134*  120   HDL   --    --    --   41*  53  42*  42*  41*   TRIG   --    --    --   131  100  77  84  99   ALT  33  25   --    --   30  44  22   --    CR  0.71  0.67   < >  0.70   --   0.80  0.70  0.70   GFRESTIMATED  79  85   < >  81   --   70  82  82   GFRESTBLACK  >90  >90  African American GFR Calc     < >  >90   --   85  >90  >90   POTASSIUM  5.2  4.0   < >  4.0   --   4.7  4.5  4.4   TSH   --    --    --    --    --    --   1.47  1.86    < > = values in this interval not displayed.            BP Readings from Last 6 Encounters:   10/05/18 148/74   05/11/18 128/78   04/20/18 138/72   04/03/18 128/64   02/16/18 140/82   09/28/17 148/78           Wt Readings from Last 3 Encounters:   10/05/18 156 lb (70.8 kg)   05/11/18 163 lb (73.9 kg)   04/20/18 161 lb (73 kg)                 Positive symptoms or findings indicated by bold designation:         ROS: 10 point ROS neg other than the symptoms noted above in the HPI.except  has Health Care Home; Gastroesophageal reflux disease without esophagitis; DDD (degenerative disc disease), lumbar; Dysuria; Anxiety state; Urinary frequency; Tuberculosis of peripheral lymph nodes; Nonspecific abnormal results of thyroid function study; Pulmonary tuberculosis; PPD positive; Papanicolaou smear of cervix with atypical squamous cells of undetermined  significance (ASC-US); Osteoporosis; Tear film insufficiency; Memory loss; Headache; Abdominal pain; Closed fracture of triquetral (cuneiform) bone of wrist; Other chronic pain; Abnormality of gait; Hearing loss; Malaise and fatigue; Acute cystitis; Major depression in complete remission (H); Sensory hearing loss, unilateral; Vitamin D deficiency disease; Hyperlipidemia LDL goal <160; Overweight (BMI 25.0-29.9); Risk for falls; ACP (advance care planning); Primary osteoarthritis of both knees; Tension headache; History of bloody stools; and Essential hypertension, benign on her problem list.  Review Of Systems    Skin: negative    Eyes: negative    Ears/Nose/Throat: negative    Respiratory: No shortness of breath, dyspnea on exertion, cough, or hemoptysis    Cardiovascular: negative    Gastrointestinal: heartburn, constipation and HEMORRHOIDS    Genitourinary: negative    Musculoskeletal: OSTEOARTHRITIS KNEES     Neurologic: negative MILD MEMORY ISSUES    Psychiatric: negative HISTORY OF ANXIETY AND DEPRESSION    Hematologic/Lymphatic/Immunologic: negative    Endocrine: negative                PE:  /74 (Cuff Size: Adult Regular)  Pulse 58  Temp 98.5  F (36.9  C) (Tympanic)  Resp 16  Wt 156 lb (70.8 kg)  LMP  (LMP Unknown)  SpO2 98%  BMI 26.36 kg/m2 Body mass index is 26.36 kg/(m^2).        Constitutional: general appearance, well nourished, well developed, in no acute distress, well developed, appears stated age, normal body habitus,          Eyes:; The patient has normal eyelids sclerae and conjunctivae :          Ears/Nose/Throat: external ear, overall: normal appearance; external nose, overall: benign appearance, normal moujth gums and lips           Neck: thyroid, overall: normal size, normal consistency, nontender,          Respiratory:  palpation of chest, overall: normal excursion,    Clear to percussion and auscultation     NO Tachypnea    NORMAL  Color          Cardiovascular:  Good color with  no peripheral edema    Regular sinus rhythm without murmur.  Physiologic heart sounds   Heart is unelarged    .     Chest/Breast: normal shape           Abdominal exam,  Liver and spleen are  unenlarged        Tenderness    Scars              Urogenital; no renal, flank or bladder  tenderness;          Lymphatic: neck nodes,     Other nodes         Musculoskeletal:  Brief ortho exam normal except:   OSTEOARTHRITIS KNEES          Integument: inspection of skin, no rash, lesions; and, palpation, no induration, no tenderness.          Neurologic mental status, overall: alert and oriented; gait, no ataxia, no unsteadiness; coordination, no tremors; cranial nerves, overall: normal motor, overall: normal bulk, tone.          Psychiatric: orientation/consciousness, overall: oriented to person, place and time; behavior/psychomotor activity, no tics, normal psychomotor activity; mood and affect, overall: normal mood and affect; appearance, overall: well-groomed, good eye contact; speech, overall: normal quality, no aphasia and normal quality, quantity, intact.        Diagnostic Test Results:  Results for orders placed or performed in visit on 05/11/18   Comprehensive metabolic panel   Result Value Ref Range    Sodium 138 133 - 144 mmol/L    Potassium 5.2 3.4 - 5.3 mmol/L    Chloride 108 94 - 109 mmol/L    Carbon Dioxide 26 20 - 32 mmol/L    Anion Gap 4 3 - 14 mmol/L    Glucose 81 70 - 99 mg/dL    Urea Nitrogen 13 7 - 30 mg/dL    Creatinine 0.71 0.52 - 1.04 mg/dL    GFR Estimate 79 >60 mL/min/1.7m2    GFR Estimate If Black >90 >60 mL/min/1.7m2    Calcium 10.7 (H) 8.5 - 10.1 mg/dL    Bilirubin Total 0.3 0.2 - 1.3 mg/dL    Albumin 3.9 3.4 - 5.0 g/dL    Protein Total 8.1 6.8 - 8.8 g/dL    Alkaline Phosphatase 73 40 - 150 U/L    ALT 33 0 - 50 U/L    AST 35 0 - 45 U/L   CBC with platelets   Result Value Ref Range    WBC 5.4 4.0 - 11.0 10e9/L    RBC Count 4.35 3.8 - 5.2 10e12/L    Hemoglobin 13.0 11.7 - 15.7 g/dL    Hematocrit 39.4  35.0 - 47.0 %    MCV 91 78 - 100 fl    MCH 29.9 26.5 - 33.0 pg    MCHC 33.0 31.5 - 36.5 g/dL    RDW 13.2 10.0 - 15.0 %    Platelet Count 171 150 - 450 10e9/L   Erythrocyte sedimentation rate auto   Result Value Ref Range    Sed Rate 19 0 - 30 mm/h   LDL cholesterol direct   Result Value Ref Range    LDL Cholesterol Direct 98 <100 mg/dL           ICD-10-CM    1. Chronic idiopathic constipation K59.04 linaclotide (LINZESS) 145 MCG capsule     COLORECTAL SURGERY REFERRAL     polyethylene glycol (MIRALAX) powder   2. Hyperlipidemia LDL goal <160 E78.5 atorvastatin (LIPITOR) 10 MG tablet   3. Vitamin D insufficiency E55.9 Cholecalciferol (VITAMIN D3) 2000 units TABS   4. Chronic seasonal allergic rhinitis due to pollen J30.1 fluticasone (FLONASE) 50 MCG/ACT spray   5. Primary osteoarthritis of both knees M17.0 glucosamine-chondroitinoitin ([facility]S GLUCOSAMINE-CHONDROITIN) 500-400 MG TABS     acetaminophen (TYLENOL) 325 MG tablet   6. External hemorrhoids K64.4 hydrocortisone (ANUSOL-HC) 2.5 % cream     hydrocortisone (CORTAID) 1 % cream   7. Age related osteoporosis, unspecified pathological fracture presence M81.0 lidocaine (XYLOCAINE) 5 % ointment   8. Gastroesophageal reflux disease without esophagitis K21.9 omeprazole (PRILOSEC) 20 MG CR capsule   9. Seasonal allergic rhinitis due to pollen J30.1 loratadine (QC LORATADINE ALLERGY RELIEF) 10 MG tablet              .    Side effects benefits and risks thoroughly discussed. .she may come in early if unimproved or getting worse          Please drink 2 glasses of water prior to meals and walk 15-30 minutes after meals        I spent 25 MINUTES SPENT  with patient discussing the following issues   The primary encounter diagnosis was Chronic idiopathic constipation. Diagnoses of Hyperlipidemia LDL goal <160, Vitamin D insufficiency, Chronic seasonal allergic rhinitis due to pollen, Primary osteoarthritis of both knees, External hemorrhoids, Age related osteoporosis, unspecified  pathological fracture presence, Gastroesophageal reflux disease without esophagitis, and Seasonal allergic rhinitis due to pollen were also pertinent to this visit. over half of which involved counseling and coordination of care.      Patient Instructions   (K59.04) Chronic idiopathic constipation  (primary encounter diagnosis)  Comment:    Plan: linaclotide (LINZESS) 145 MCG capsule,  One caps         COLORECTAL SURGERY REFERRAL, polyethylene         glycol (MIRALAX) powder          1 scoop twice daily         (E78.5) Hyperlipidemia LDL goal <160  Comment:    Plan: atorvastatin (LIPITOR) 10 MG tablet             (E55.9) Vitamin D insufficiency  Comment:    Plan: Cholecalciferol (VITAMIN D3) 2000 units TABS             (J30.1) Chronic seasonal allergic rhinitis due to pollen  Comment:    Plan: fluticasone (FLONASE) 50 MCG/ACT spray             (M17.0) Primary osteoarthritis of both knees  Comment:    Plan: glucosamine-chondroitinoitin (CVS         GLUCOSAMINE-CHONDROITIN) 500-400 MG TABS,         acetaminophen (TYLENOL) 325 MG tablet             (K64.4) External hemorrhoids  Comment:    Plan: hydrocortisone (ANUSOL-HC) 2.5 % cream,         hydrocortisone (CORTAID) 1 % cream             (M81.0) Age related osteoporosis, unspecified pathological fracture presence  Comment:    Plan: lidocaine (XYLOCAINE) 5 % ointment             (K21.9) Gastroesophageal reflux disease without esophagitis  Comment:    Plan: omeprazole (PRILOSEC) 20 MG CR capsule             (J30.1) Seasonal allergic rhinitis due to pollen  Comment:    Plan: loratadine (QC LORATADINE ALLERGY RELIEF) 10 MG        tablet                                  ALL THE ABOVE PROBLEMS ARE STABLE AND MED CHANGES AS NOTED        Diet:  MEDITERRANEAN DIET EXTRA WATER         Exercise:  AS TOLERATED WALKING  AND KNEE PAIN   Exercises Range of motion, balance, isometric, and strengthening exercises 30 repetitions twice daily of involved joints            .GAYLA  MD GARY 10/5/2018 7:06 AM  October 6, 2018

## 2018-10-05 NOTE — LETTER
My Depression Action Plan  Name: Rody Gonzalez   Date of Birth 1940  Date: 10/5/2018    My doctor: Osman Yen   My clinic: [address]  [facility] [address] [phone]          GREEN    ZONE   Good Control    What it looks like:     Things are going generally well. You have normal up s and down s. You may even feel depressed from time to time, but bad moods usually last less than a day.   What you need to do:  1. Continue to care for yourself (see self care plan)  2. Check your depression survival kit and update it as needed  3. Follow your physician s recommendations including any medication.  4. Do not stop taking medication unless you consult with your physician first.           YELLOW         ZONE Getting Worse    What it looks like:     Depression is starting to interfere with your life.     It may be hard to get out of bed; you may be starting to isolate yourself from others.    Symptoms of depression are starting to last most all day and this has happened for several days.     You may have suicidal thoughts but they are not constant.   What you need to do:     1. Call your care team, your response to treatment will improve if you keep your care team informed of your progress. Yellow periods are signs an adjustment may need to be made.     2. Continue your self-care, even if you have to fake it!    3. Talk to someone in your support network    4. Open up your depression survival kit           RED    ZONE Medical Alert - Get Help    What it looks like:     Depression is seriously interfering with your life.     You may experience these or other symptoms: You can t get out of bed most days, can t work or engage in other necessary activities, you have trouble taking care of basic hygiene, or basic responsibilities, thoughts of suicide or death that will not go away, self-injurious behavior.     What you need to  do:  1. Call your care team and request a same-day appointment. If they are not available (weekends or after hours) call your local crisis line, emergency room or 911.            Depression Self Care Plan / Survival Kit    Self-Care for Depression  Here s the deal. Your body and mind are really not as separate as most people think.  What you do and think affects how you feel and how you feel influences what you do and think. This means if you do things that people who feel good do, it will help you feel better.  Sometimes this is all it takes.  There is also a place for medication and therapy depending on how severe your depression is, so be sure to consult with your medical provider and/ or Behavioral Health Consultant if your symptoms are worsening or not improving.     In order to better manage my stress, I will:    Exercise  Get some form of exercise, every day. This will help reduce pain and release endorphins, the  feel good  chemicals in your brain. This is almost as good as taking antidepressants!  This is not the same as joining a gym and then never going! (they count on that by the way ) It can be as simple as just going for a walk or doing some gardening, anything that will get you moving.      Hygiene   Maintain good hygiene (Get out of bed in the morning, Make your bed, Brush your teeth, Take a shower, and Get dressed like you were going to work, even if you are unemployed).  If your clothes don't fit try to get ones that do.    Diet  I will strive to eat foods that are good for me, drink plenty of water, and avoid excessive sugar, caffeine, alcohol, and other mood-altering substances.  Some foods that are helpful in depression are: complex carbohydrates, B vitamins, flaxseed, fish or fish oil, fresh fruits and vegetables.    Psychotherapy  I agree to participate in Individual Therapy (if recommended).    Medication  If prescribed medications, I agree to take them.  Missing doses can result in serious  side effects.  I understand that drinking alcohol, or other illicit drug use, may cause potential side effects.  I will not stop my medication abruptly without first discussing it with my provider.    Staying Connected With Others  I will stay in touch with my friends, family members, and my primary care provider/team.    Use your imagination  Be creative.  We all have a creative side; it doesn t matter if it s oil painting, sand castles, or mud pies! This will also kick up the endorphins.    Witness Beauty  (AKA stop and smell the roses) Take a look outside, even in mid-winter. Notice colors, textures. Watch the squirrels and birds.     Service to others  Be of service to others.  There is always someone else in need.  By helping others we can  get out of ourselves  and remember the really important things.  This also provides opportunities for practicing all the other parts of the program.    Humor  Laugh and be silly!  Adjust your TV habits for less news and crime-drama and more comedy.    Control your stress  Try breathing deep, massage therapy, biofeedback, and meditation. Find time to relax each day.     My support system    Clinic Contact:  Phone number:    Contact 1:  Phone number:    Contact 2:  Phone number:    Restoration/:  Phone number:    Therapist:  Phone number:    Local crisis center:    Phone number:    Other community support:  Phone number:

## 2018-10-23 ENCOUNTER — TRANSFERRED RECORDS (OUTPATIENT)
Dept: HEALTH INFORMATION MANAGEMENT | Facility: CLINIC | Age: 78
End: 2018-10-23

## 2018-12-19 ENCOUNTER — TRANSFERRED RECORDS (OUTPATIENT)
Dept: HEALTH INFORMATION MANAGEMENT | Facility: CLINIC | Age: 78
End: 2018-12-19

## 2019-02-07 ENCOUNTER — PATIENT OUTREACH (OUTPATIENT)
Dept: GERIATRIC MEDICINE | Facility: CLINIC | Age: 79
End: 2019-02-07

## 2019-02-07 NOTE — PROGRESS NOTES
[facility] Care Coordination Contact    Called member to schedule annual HRA home visit. HRA has been scheduled for 02/19/2019.   Johnna Malin RN BSN PHN  [facility]   RN Care Coordinator   Phone:   [phone]  Fax:       [phone]

## 2019-02-13 ENCOUNTER — OFFICE VISIT (OUTPATIENT)
Dept: FAMILY MEDICINE | Facility: CLINIC | Age: 79
End: 2019-02-13
Payer: COMMERCIAL

## 2019-02-13 VITALS
DIASTOLIC BLOOD PRESSURE: 70 MMHG | SYSTOLIC BLOOD PRESSURE: 136 MMHG | WEIGHT: 163 LBS | OXYGEN SATURATION: 97 % | BODY MASS INDEX: 27.16 KG/M2 | HEIGHT: 65 IN | HEART RATE: 68 BPM | RESPIRATION RATE: 16 BRPM

## 2019-02-13 DIAGNOSIS — E55.9 VITAMIN D DEFICIENCY DISEASE: ICD-10-CM

## 2019-02-13 DIAGNOSIS — F32.5 MAJOR DEPRESSION IN COMPLETE REMISSION (H): ICD-10-CM

## 2019-02-13 DIAGNOSIS — E78.5 HYPERLIPIDEMIA LDL GOAL <160: Chronic | ICD-10-CM

## 2019-02-13 DIAGNOSIS — I10 ESSENTIAL HYPERTENSION, BENIGN: Primary | ICD-10-CM

## 2019-02-13 PROCEDURE — 99214 OFFICE O/P EST MOD 30 MIN: CPT | Performed by: FAMILY MEDICINE

## 2019-02-13 ASSESSMENT — ANXIETY QUESTIONNAIRES
3. WORRYING TOO MUCH ABOUT DIFFERENT THINGS: NOT AT ALL
GAD7 TOTAL SCORE: 0
7. FEELING AFRAID AS IF SOMETHING AWFUL MIGHT HAPPEN: NOT AT ALL
6. BECOMING EASILY ANNOYED OR IRRITABLE: NOT AT ALL
2. NOT BEING ABLE TO STOP OR CONTROL WORRYING: NOT AT ALL
GAD7 TOTAL SCORE: 0
GAD7 TOTAL SCORE: 0
5. BEING SO RESTLESS THAT IT IS HARD TO SIT STILL: NOT AT ALL
4. TROUBLE RELAXING: NOT AT ALL
1. FEELING NERVOUS, ANXIOUS, OR ON EDGE: NOT AT ALL
7. FEELING AFRAID AS IF SOMETHING AWFUL MIGHT HAPPEN: NOT AT ALL

## 2019-02-13 ASSESSMENT — PATIENT HEALTH QUESTIONNAIRE - PHQ9
SUM OF ALL RESPONSES TO PHQ QUESTIONS 1-9: 2
10. IF YOU CHECKED OFF ANY PROBLEMS, HOW DIFFICULT HAVE THESE PROBLEMS MADE IT FOR YOU TO DO YOUR WORK, TAKE CARE OF THINGS AT HOME, OR GET ALONG WITH OTHER PEOPLE: NOT DIFFICULT AT ALL
SUM OF ALL RESPONSES TO PHQ QUESTIONS 1-9: 2

## 2019-02-13 ASSESSMENT — MIFFLIN-ST. JEOR: SCORE: 1207.3

## 2019-02-13 NOTE — PROGRESS NOTES
"  SUBJECTIVE:   Rody Gonzalez is a 79 year old female who presents to clinic today for the following health issues:    Pt here with her daughter and [place]     Form      Duration:     Description (location/character/radiation): pt has a form from [facility] diet form    Intensity:      Accompanying signs and symptoms:     History (similar episodes/previous evaluation): None    Precipitating or alleviating factors: None    Therapies tried and outcome: None   Pt had diet form filled out last year, needs to be updated for this year      Previous diet form from 2015 and from 2018  found in Media, daughter and  state that diet needs have remained the same    Hyperlipidemia Follow-Up      Rate your low fat/cholesterol diet?: good    Taking statin?  Yes, no muscle aches from statin    Other lipid medications/supplements?:  none    Hypertension Follow-up      Outpatient blood pressures are not being checked.    Low Salt Diet: no added salt      Problem list and histories reviewed & adjusted, as indicated.  Additional history: as documented    Labs reviewed in EPIC    Reviewed and updated as needed this visit by clinical staff  Tobacco  Allergies  Meds  Problems  Med Hx  Surg Hx  Fam Hx  Soc Hx        Reviewed and updated as needed this visit by Provider  Tobacco  Allergies  Meds  Problems  Med Hx  Surg Hx  Fam Hx         ROS:  CONSTITUTIONAL:NEGATIVE for fever, chills, change in weight  RESP:NEGATIVE for significant cough or SOB  CV: NEGATIVE for chest pain, palpitations or peripheral edema  MUSCULOSKELETAL: POSITIVE  for arthralgias diffuse and back pain low back    OBJECTIVE:                                                    /70   Pulse 68   Resp 16   Ht 1.638 m (5' 4.5\")   Wt 73.9 kg (163 lb)   LMP  (LMP Unknown)   SpO2 97%   BMI 27.55 kg/m    Body mass index is 27.55 kg/m .  GENERAL APPEARANCE: healthy, alert and no distress  RESP: lungs clear to " auscultation - no rales, rhonchi or wheezes  CV: regular rates and rhythm, normal S1 S2, no S3 or S4 and no murmur, click or rub  ABDOMEN: soft, nontender, without hepatosplenomegaly or masses and bowel sounds normal  MS: extremities normal- no gross deformities noted  NEURO: Normal strength and tone, mentation intact and speech normal    Diagnostic test results:  none      ASSESSMENT/PLAN:                                                        ICD-10-CM    1. Essential hypertension, benign I10    2. Vitamin D deficiency disease E55.9    3. Hyperlipidemia LDL goal <160 E78.5    4. 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  Copy made to be scanned, original given back to Pt's daughter    Follow up with Provider - 3 mo    Patient Instructions   Continue with same diet and medications      Kevin Slaughter MD  [facility]

## 2019-02-14 ASSESSMENT — ANXIETY QUESTIONNAIRES: GAD7 TOTAL SCORE: 0

## 2019-02-19 ENCOUNTER — PATIENT OUTREACH (OUTPATIENT)
Dept: GERIATRIC MEDICINE | Facility: CLINIC | Age: 79
End: 2019-02-19

## 2019-02-19 ASSESSMENT — PATIENT HEALTH QUESTIONNAIRE - PHQ9: SUM OF ALL RESPONSES TO PHQ QUESTIONS 1-9: 7

## 2019-02-19 ASSESSMENT — ACTIVITIES OF DAILY LIVING (ADL): DEPENDENT_IADLS:: CLEANING;LAUNDRY;COOKING;SHOPPING;MEAL PREPARATION;TRANSPORTATION;MEDICATION MANAGEMENT

## 2019-02-19 NOTE — PROGRESS NOTES
[facility] Home Visit Assessment     Home visit for Health Risk Assessment with Rody Gonzalez completed on February 19, 2019    Type of residence:: Apartment  Current living arrangement:: I live alone     Assessment completed with:: Patient    Current Care Plan  Member currently receiving the following home care services:   N/A  Member currently receiving the following community resources:  PCA, ADC and ADC with transportation    Medication Review  Medication reconciliation completed in Epic: Yes  Medication set-up & administration: Family/informal caregiver sets up daily.  Family caregiver administers medications.  Medication Risk Assessment Medication (1 or more, place referral to MTM): Lacks understanding of medication regimen  MTM Referral Placed: No: Member declined at this time    Mental/Behavioral Health   Depression Screening: See PHQ assessment flowsheet.   Mental health DX:: Yes(Anxiety)   Mental health DX how managed:: None  Mental Health Diagnosis: Yes: Anxiety  Mental Health Services: Yes: Current mental health services:  PCP    Falls Assessment:   Fallen 2 or more times in the past year?: No   Any fall with injury in the past year?: No    ADL/IADL Dependencies:   Dependent ADLs:: Ambulation-cane, Bathing, Dressing, Transfers  Dependent IADLs:: Cleaning, Laundry, Cooking, Shopping, Meal Preparation, Transportation, Medication Management    [facility] sponsored benefits: Shared information re: Silver Sneakers/gym memberships, ASA, Calcium +D.    PCA Assessment completed at visit: Yes     Elderly Waiver Eligibility: Yes-will continue on EW    Care Plan & Recommendations: This CM to continue current POC including PCA, ADC and ADC transportation.  Member is inquiring if she can resume nutritional supplement, ensure.  Informed member that this CM will follow up with PCP and DME provider regarding ensure request.  Member reports she had hemorrhoid surgery last year and has relief of hemorrhoid  symptoms but continues to have symptoms of chronic constipation.  Also, member is due for annual MA renewal and member reports that daughter, Eve has helped member filled up paperwork and she will follow up with [facility] regarding MA renewal paperwork.    See [facility] for detailed assessment information.    Follow-Up Plan: Member informed of future contact, plan to f/u with member with a 6 month telephone assessment.  Contact information shared with member and family, encouraged member to call with any questions or concerns at any time.    Central care continuum providers: Please refer to Health Care Home on the Epic Problem List to view this patient's [facility] Care Plan Summary.    Johnna Malin RN BSN PHN  [facility]   RN Care Coordinator   Phone:   [phone]  Fax:       [phone]

## 2019-03-04 ENCOUNTER — PATIENT OUTREACH (OUTPATIENT)
Dept: GERIATRIC MEDICINE | Facility: CLINIC | Age: 79
End: 2019-03-04

## 2019-03-04 NOTE — PROGRESS NOTES
[facility] Care Coordination Contact    [facility]:  Emailed completed PCA assessment to [facility].  Faxed copy of PCA assessment to [facility] Agency and mailed copy to member.  Faxed MD Communication to PCP.     Dale Sullivan  Care Management Specialist  [facility]  [phone]

## 2019-03-07 ENCOUNTER — PATIENT OUTREACH (OUTPATIENT)
Dept: GERIATRIC MEDICINE | Facility: CLINIC | Age: 79
End: 2019-03-07

## 2019-03-07 NOTE — PROGRESS NOTES
[facility] Care Coordination Contact    Member called and is requesting change in ADC to Multiple Choice ADC as of this week, Monday 0304/2019.  Updated and completed POC.  Also, updated CMS to process auth and complete end of visit.  Johnna Malin RN BSN PHN  [facility]   RN Care Coordinator   Phone:   [phone]  Fax:       [phone]

## 2019-03-12 ENCOUNTER — PATIENT OUTREACH (OUTPATIENT)
Dept: GERIATRIC MEDICINE | Facility: CLINIC | Age: 79
End: 2019-03-12

## 2019-03-12 NOTE — PROGRESS NOTES
[facility] Care Coordination Contact    Received after visit chart from care coordinator.  Completed following tasks: Mailed copy of care plan to client, Updated services in access and Submitted referrals/auths for Multiple Choice ADC: ADC and Transportation and [facility]: Homemaking  Chart was returned to CC.    Provider Signature - No POC Shared:  Member indicates that they do not want their POC shared with any EW providers.  Order placed with [facility] (p: [phone]; f: [phone]) for Ensure: 1 can per day: Mixture of strawberry and vanilla.  Order is pending.     Dale Sullivan  Care Management Specialist  [facility]  [phone]

## 2019-03-12 NOTE — LETTER
March 12, 2019      SKYE MUNOZ  [address]     [facility] [address]      Dear Skye:    At [facility], we are dedicated to improving your health and well-being. Enclosed is the Comprehensive Care Plan that we developed with you on 02-. Please review the Care Plan carefully.    As a reminder, some of the things we discussed at your visit include:    Your physical and mental health    Ways to reduce falls    Health care needs you may have    Don t forget to contact your care coordinator if you:    Have been hospitalized or plan to be hospitalized     Have had a fall     Have experienced a change in physical health    Are experiencing emotional problems     If you do not agree with your Care Plan, have questions about it, or have experienced a change in your needs, please call me at [phone]. If you are hearing impaired, please call the [place] Relay at 510 or [phone] (caefcf-io-plrsby relay service).    Sincerely,    CANDICE Collins, RN, PHN    E-mail: [email or web address]  Phone: [phone]      [facility] ([facility]) is a health plan that contracts with both Medicare and the [place] Medical Assistance (Medicaid) program to provide benefits of both programs to enrollees. Enrollment in [facility] depends on contract renewal.    [id number])     [id number] (11/18)

## 2019-04-12 DIAGNOSIS — E78.5 HYPERLIPIDEMIA LDL GOAL <160: Chronic | ICD-10-CM

## 2019-04-12 RX ORDER — ATORVASTATIN CALCIUM 10 MG/1
TABLET, FILM COATED ORAL
Qty: 30 TABLET | Refills: 0 | Status: SHIPPED | OUTPATIENT
Start: 2019-04-12 | End: 2019-04-18

## 2019-04-12 NOTE — TELEPHONE ENCOUNTER
"ATORVASTATIN CALCIUM 10MG TABLET  Last Written Prescription Date:  10/05/2018  Last Fill Quantity: 30,  # refills: 11   Last office visit: 2/13/2019 with prescribing provider:  02/13/2019   Future Office Visit:   Next 5 appointments (look out 90 days)    Apr 18, 2019 10:45 AM CDT  PHYSICAL with Osman Yen MD  [facility] [address] [address] [phone]         Requested Prescriptions   Pending Prescriptions Disp Refills     atorvastatin (LIPITOR) 10 MG tablet [Pharmacy Med Name: ATORVASTATIN CALCIUM 10MG TABLET] 90 tablet 2     Sig: TAKE 1 TABLET (10 MG) BY MOUTH DAILY       Statins Protocol Passed - 4/12/2019  9:39 AM        Passed - LDL on file in past 12 months     Recent Labs   Lab Test 05/11/18  1634   LDL 98             Passed - No abnormal creatine kinase in past 12 months     No lab results found.             Passed - Recent (12 mo) or future (30 days) visit within the authorizing provider's specialty     Patient had office visit in the last 12 months or has a visit in the next 30 days with authorizing provider or within the authorizing provider's specialty.  See \"Patient Info\" tab in inbasket, or \"Choose Columns\" in Meds & Orders section of the refill encounter.              Passed - Medication is active on med list        Passed - Patient is age 18 or older        Passed - No active pregnancy on record        Passed - No positive pregnancy test in past 12 months          "

## 2019-04-12 NOTE — TELEPHONE ENCOUNTER
"Requested Prescriptions   Pending Prescriptions Disp Refills     atorvastatin (LIPITOR) 10 MG tablet [Pharmacy Med Name: ATORVASTATIN CALCIUM 10MG TABLET] 90 tablet 2     Sig: TAKE 1 TABLET (10 MG) BY MOUTH DAILY    Last Written Prescription Date:  10/5/2018  Last Fill Quantity: 30 tablet,  # refills: 10/5/2018   Last office visit: 2/13/2019 with prescribing provider:  Sukhjinder   Future Office Visit:   Next 5 appointments (look out 90 days)    Apr 18, 2019 10:45 AM CDT  PHYSICAL with Osman Yen MD  [facility] [address] [facility]  [address]  [facility] [address]  [phone]                Statins Protocol Passed - 4/12/2019  9:39 AM        Passed - LDL on file in past 12 months     Recent Labs   Lab Test 05/11/18  1634   LDL 98             Passed - No abnormal creatine kinase in past 12 months     No lab results found.             Passed - Recent (12 mo) or future (30 days) visit within the authorizing provider's specialty     Patient had office visit in the last 12 months or has a visit in the next 30 days with authorizing provider or within the authorizing provider's specialty.  See \"Patient Info\" tab in inbasket, or \"Choose Columns\" in Meds & Orders section of the refill encounter.              Passed - Medication is active on med list        Passed - Patient is age 18 or older        Passed - No active pregnancy on record        Passed - No positive pregnancy test in past 12 months           "

## 2019-04-18 ENCOUNTER — OFFICE VISIT (OUTPATIENT)
Dept: FAMILY MEDICINE | Facility: CLINIC | Age: 79
End: 2019-04-18
Payer: COMMERCIAL

## 2019-04-18 VITALS
TEMPERATURE: 98.9 F | BODY MASS INDEX: 26.87 KG/M2 | RESPIRATION RATE: 16 BRPM | WEIGHT: 159 LBS | SYSTOLIC BLOOD PRESSURE: 126 MMHG | DIASTOLIC BLOOD PRESSURE: 68 MMHG | OXYGEN SATURATION: 98 % | HEART RATE: 60 BPM

## 2019-04-18 DIAGNOSIS — G44.219 EPISODIC TENSION-TYPE HEADACHE, NOT INTRACTABLE: ICD-10-CM

## 2019-04-18 DIAGNOSIS — E55.9 VITAMIN D INSUFFICIENCY: ICD-10-CM

## 2019-04-18 DIAGNOSIS — E78.5 HYPERLIPIDEMIA LDL GOAL <160: Chronic | ICD-10-CM

## 2019-04-18 DIAGNOSIS — K21.9 GASTROESOPHAGEAL REFLUX DISEASE WITHOUT ESOPHAGITIS: Chronic | ICD-10-CM

## 2019-04-18 DIAGNOSIS — M17.0 PRIMARY OSTEOARTHRITIS OF BOTH KNEES: Chronic | ICD-10-CM

## 2019-04-18 DIAGNOSIS — H04.123 DRY EYE SYNDROME OF BOTH EYES: ICD-10-CM

## 2019-04-18 DIAGNOSIS — M81.0 AGE RELATED OSTEOPOROSIS, UNSPECIFIED PATHOLOGICAL FRACTURE PRESENCE: ICD-10-CM

## 2019-04-18 DIAGNOSIS — H90.3 SENSORINEURAL HEARING LOSS (SNHL) OF BOTH EARS: Primary | ICD-10-CM

## 2019-04-18 PROCEDURE — 99214 OFFICE O/P EST MOD 30 MIN: CPT | Performed by: FAMILY MEDICINE

## 2019-04-18 RX ORDER — ATORVASTATIN CALCIUM 10 MG/1
10 TABLET, FILM COATED ORAL DAILY
Qty: 30 TABLET | Refills: 11 | Status: SHIPPED | OUTPATIENT
Start: 2019-04-18 | End: 2019-08-23

## 2019-04-18 RX ORDER — LIDOCAINE 50 MG/G
OINTMENT TOPICAL
Qty: 150 G | Refills: 3 | Status: SHIPPED | OUTPATIENT
Start: 2019-04-18 | End: 2019-11-07

## 2019-04-18 RX ORDER — CHOLECALCIFEROL (VITAMIN D3) 50 MCG
2 TABLET ORAL
Qty: 60 TABLET | Refills: 11 | Status: SHIPPED | OUTPATIENT
Start: 2019-04-18 | End: 2019-08-23

## 2019-04-18 RX ORDER — ACETAMINOPHEN 325 MG/1
325-650 TABLET ORAL EVERY 6 HOURS PRN
Qty: 100 TABLET | Refills: 11 | Status: SHIPPED | OUTPATIENT
Start: 2019-04-18 | End: 2019-11-07

## 2019-04-18 ASSESSMENT — PATIENT HEALTH QUESTIONNAIRE - PHQ9: SUM OF ALL RESPONSES TO PHQ QUESTIONS 1-9: 0

## 2019-04-18 NOTE — PROGRESS NOTES
SUBJECTIVE:     Rody Gonzalez is a 79 year old female who presents to clinic today for the following     health issues:    Due to language barrier, an  was present during the history-taking and subsequent discussion (and for part of the physical exam) with this patient.        Hearing     History of bilateral neurosensory hearing loss    Wants a referral        Duration: over 1 year    Description (location/character/radiation): right is worse than the left    Intensity:  moderate    Accompanying signs and symptoms: was hearing a whooshing sound, now hard to hear    History (similar episodes/previous evaluation): None    Precipitating or alleviating factors: None    Therapies tried and outcome: has been to ENT previously         History of hypertension    Primary osteoarthritis of both knees    Hyperlipidemia    History of depression and anxiety    History of mild intermittent memory loss    Urinary frequency     history of pulmonary tuberculosis which has been treated    History of osteoporosis    History of osteoarthritis of both knees    History of chronic lumbar disc disease    Moderate intermittent headaches    Malaise and fatigue    History of bloody stools    History of being overweight    History of dry eye syndrome    History of recurrent urinary tract infections    Patient has a gait abnormality    At high risk for falls                    Topic   Date Due         LIPID MONITORING Q1 YEAR    05/11/2019                 .    Current Outpatient Medications     Medication   Sig   Dispense   Refill         acetaminophen (TYLENOL) 325 MG tablet   Take 1-2 tablets (325-650 mg) by mouth every 6 hours as needed for mild pain   100 tablet   11         aspirin-acetaminophen-caffeine (EXCEDRIN MIGRAINE) 250-250-65 MG tablet   Take 1 tablet by mouth every 6 hours as needed for headaches   80 tablet   1         atorvastatin (LIPITOR) 10 MG tablet   Take 1 tablet (10 mg) by mouth daily   30 tablet   11          Cholecalciferol (VITAMIN D3) 2000 units TABS   Take 2 tablets by mouth daily (with breakfast)   60 tablet   11         lidocaine (XYLOCAINE) 5 % external ointment   One application 4 x daily to affected areas lower back and knees if necessary Profile Rx: patient will contact pharmacy when needed   150 g   3         omeprazole (PRILOSEC) 20 MG DR capsule   TAKE 1 TABLET (20 MG) BY MOUTH DAILY   90 capsule   3         polyethylene glycol 0.4%- propylene glycol 0.3% (SYSTANE) 0.4-0.3 % SOLN ophthalmic solution   Place 1 drop into both eyes every hour as needed for dry eyes   15 mL   11                  Allergies     Allergen   Reactions         Isoniazid            Pyrazinamide                Immunization History     Administered   Date(s) Administered         Pneumococcal 23 valent   2006         Tdap (Adacel,Boostrix)   2006               reports that she does not drink alcohol.          reports that she does not use drugs.        family history includes Family History Negative in her father and mother.        indicated that the status of her no family hx of is unknown. She indicated that her mother is . She indicated that her father is .             has a past surgical history that includes Hysterectomy ().         reports that she does not currently engage in sexual activity but has had partners who are Male.    .    Pediatric History     Patient Guardian Status         Not on file         Other Topics   Concern         Parent/sibling w/ CABG, MI or angioplasty before 65F 55M?   No     Social History Narrative         Not on file               reports that she has never smoked. She has never used smokeless tobacco.        Medical, social, surgical, and family histories reviewed.        Labs reviewed in EPIC    Patient Active Problem List     Diagnosis         Health Care Home         Gastroesophageal reflux disease without esophagitis         DDD (degenerative disc disease),  lumbar         Dysuria         Anxiety state         Urinary frequency         Tuberculosis of peripheral lymph nodes         Nonspecific abnormal results of thyroid function study         Pulmonary tuberculosis         PPD positive         Papanicolaou smear of cervix with atypical squamous cells of undetermined significance (ASC-US)         Osteoporosis         Tear film insufficiency         Memory loss         Headache         Abdominal pain         Closed fracture of triquetral (cuneiform) bone of wrist         Other chronic pain         Abnormality of gait         Hearing loss         Malaise and fatigue         Acute cystitis         Major depression in complete remission (H)         Sensory hearing loss, unilateral         Vitamin D deficiency disease         Hyperlipidemia LDL goal <160         Overweight (BMI 25.0-29.9)         Risk for falls         ACP (advance care planning)         Primary osteoarthritis of both knees         Tension headache         History of bloody stools         Essential hypertension, benign         Past Surgical History:     Procedure   Laterality   Date         HYSTERECTOMY      2004           Social History         Tobacco Use         Smoking status:   Never Smoker         Smokeless tobacco:   Never Used     Substance Use Topics         Alcohol use:   No         Family History     Problem   Relation   Age of Onset         Family History Negative   Mother            Family History Negative   Father            Diabetes   No family hx of            Coronary Artery Disease   No family hx of            Hypertension   No family hx of            Hyperlipidemia   No family hx of            Cerebrovascular Disease   No family hx of            Breast Cancer   No family hx of            Colon Cancer   No family hx of            Prostate Cancer   No family hx of            Other Cancer   No family hx of            Depression   No family hx of            Anxiety Disorder   No family hx of             Mental Illness   No family hx of            Substance Abuse   No family hx of            Anesthesia Reaction   No family hx of            Asthma   No family hx of            Osteoporosis   No family hx of            Genetic Disorder   No family hx of            Thyroid Disease   No family hx of            Obesity   No family hx of            Unknown/Adopted   No family hx of                  Current Outpatient Medications     Medication   Sig   Dispense   Refill         acetaminophen (TYLENOL) 325 MG tablet   Take 1-2 tablets (325-650 mg) by mouth every 6 hours as needed for mild pain   100 tablet   11         aspirin-acetaminophen-caffeine (EXCEDRIN MIGRAINE) 250-250-65 MG tablet   Take 1 tablet by mouth every 6 hours as needed for headaches   80 tablet   1         atorvastatin (LIPITOR) 10 MG tablet   Take 1 tablet (10 mg) by mouth daily   30 tablet   11         Cholecalciferol (VITAMIN D3) 2000 units TABS   Take 2 tablets by mouth daily (with breakfast)   60 tablet   11         lidocaine (XYLOCAINE) 5 % external ointment   One application 4 x daily to affected areas lower back and knees if necessary Profile Rx: patient will contact pharmacy when needed   150 g   3         omeprazole (PRILOSEC) 20 MG DR capsule   TAKE 1 TABLET (20 MG) BY MOUTH DAILY   90 capsule   3         polyethylene glycol 0.4%- propylene glycol 0.3% (SYSTANE) 0.4-0.3 % SOLN ophthalmic solution   Place 1 drop into both eyes every hour as needed for dry eyes   15 mL   11             Recent Labs     Lab Test   05/11/18    1634   07/18/17    1605      04/13/16    0943   12/04/15    1530   04/10/15    1257   03/04/14    1050   09/13/13    1125     LDL   98    --     --    126*   125*   116   134*   120     HDL    --     --     --    41*   53   42*   42*   41*     TRIG    --     --     --    131   100   77   84   99     ALT   33   25    --     --    30   44   22    --      CR   0.71   0.67     < >   0.70    --    0.80   0.70   0.70      GFRESTIMATED   79   85     < >   81    --    70   82   82     GFRESTBLACK   >90   >90    African American GFR Calc         < >   >90    --    85   >90   >90     POTASSIUM   5.2   4.0     < >   4.0    --    4.7   4.5   4.4     TSH    --     --     --     --     --     --    1.47   1.86      < > = values in this interval not displayed.              BP Readings from Last 6 Encounters:     04/18/19   126/68     02/13/19   136/70     10/05/18   148/74     05/11/18   128/78     04/20/18   138/72     04/03/18   128/64             Wt Readings from Last 3 Encounters:     04/18/19   72.1 kg (159 lb)     02/13/19   73.9 kg (163 lb)     10/05/18   70.8 kg (156 lb)                 Positive symptoms or findings indicated by bold designation:         ROS: 10 point ROS neg other than the symptoms noted above in the HPI.except  has Health Care Home; Gastroesophageal reflux disease without esophagitis; DDD (degenerative disc disease), lumbar; Dysuria; Anxiety state; Urinary frequency; Tuberculosis of peripheral lymph nodes; Nonspecific abnormal results of thyroid function study; Pulmonary tuberculosis; PPD positive; Papanicolaou smear of cervix with atypical squamous cells of undetermined significance (ASC-US); Osteoporosis; Tear film insufficiency; Memory loss; Headache; Abdominal pain; Closed fracture of triquetral (cuneiform) bone of wrist; Other chronic pain; Abnormality of gait; Hearing loss; Malaise and fatigue; Acute cystitis; Major depression in complete remission (H); Sensory hearing loss, unilateral; Vitamin D deficiency disease; Hyperlipidemia LDL goal <160; Overweight (BMI 25.0-29.9); Risk for falls; ACP (advance care planning); Primary osteoarthritis of both knees; Tension headache; History of bloody stools; and Essential hypertension, benign on their problem list.  Review Of Systems    History of abnormal Pap smear    History of abnormal gait    Bilateral hearing loss once referral    Overweight    And high risk for  falling    History of recurrent tension headaches    History of bloody stools    Hypertension under good control present regimen    Skin: negative    Eyes: negative    Ears/Nose/Throat: negative    Respiratory: No shortness of breath, dyspnea on exertion, cough, or hemoptysis    Cardiovascular: negative    Gastrointestinal: heartburn    Genitourinary: incontinence    Musculoskeletal: back pain, neck pain and arthritis    Neurologic: negative    Psychiatric: anxiety and depression    Hematologic/Lymphatic/Immunologic: negative    Endocrine: negative                PE:  /68   Pulse 60   Temp 98.9  F (37.2  C) (Tympanic)   Resp 16   Wt 72.1 kg (159 lb)   LMP  (LMP Unknown)   SpO2 98%   BMI 26.87 kg/m   Body mass index is 26.87 kg/m .        Constitutional: general appearance, well nourished, well developed, in no acute distress, well developed, appears stated age, normal body habitus,          Eyes:; The patient has normal eyelids sclerae and conjunctivae :          Ears/Nose/Throat: external ear, overall: normal appearance; external nose, overall: benign appearance, normal moujth gums and lips           Neck: thyroid, overall: normal size, normal consistency, nontender,          Respiratory:  palpation of chest, overall: normal excursion,    Clear to percussion and auscultation     NO Tachypnea    NORMAL  Color          Cardiovascular:  Good color with no peripheral edema    Regular sinus rhythm without murmur.  Physiologic heart sounds   Heart is unelarged    .     Chest/Breast: normal shape           Abdominal exam,  Liver and spleen are  unenlarged        Tenderness    Scars              Urogenital; no renal, flank or bladder  tenderness;          Lymphatic: neck nodes,     Other nodes         Musculoskeletal:  Brief ortho exam normal except:   OA both knees    Decreased range of motion of back        Integument: inspection of skin, no rash, lesions; and, palpation, no induration, no tenderness.           Neurologic mental status, overall: alert and oriented; gait, no ataxia, no unsteadiness; coordination, no tremors; cranial nerves, overall: normal motor, overall: normal bulk, tone.          Psychiatric: orientation/consciousness, overall: oriented to person, place and time; behavior/psychomotor activity, no tics, normal psychomotor activity; mood and affect, overall: normal mood and affect; appearance, overall: well-groomed, good eye contact; speech, overall: normal quality, no aphasia and normal quality, quantity, intact.        Diagnostic Test Results:  Results for orders placed or performed in visit on 05/11/18   Comprehensive metabolic panel   Result Value Ref Range    Sodium 138 133 - 144 mmol/L    Potassium 5.2 3.4 - 5.3 mmol/L    Chloride 108 94 - 109 mmol/L    Carbon Dioxide 26 20 - 32 mmol/L    Anion Gap 4 3 - 14 mmol/L    Glucose 81 70 - 99 mg/dL    Urea Nitrogen 13 7 - 30 mg/dL    Creatinine 0.71 0.52 - 1.04 mg/dL    GFR Estimate 79 >60 mL/min/1.7m2    GFR Estimate If Black >90 >60 mL/min/1.7m2    Calcium 10.7 (H) 8.5 - 10.1 mg/dL    Bilirubin Total 0.3 0.2 - 1.3 mg/dL    Albumin 3.9 3.4 - 5.0 g/dL    Protein Total 8.1 6.8 - 8.8 g/dL    Alkaline Phosphatase 73 40 - 150 U/L    ALT 33 0 - 50 U/L    AST 35 0 - 45 U/L   CBC with platelets   Result Value Ref Range    WBC 5.4 4.0 - 11.0 10e9/L    RBC Count 4.35 3.8 - 5.2 10e12/L    Hemoglobin 13.0 11.7 - 15.7 g/dL    Hematocrit 39.4 35.0 - 47.0 %    MCV 91 78 - 100 fl    MCH 29.9 26.5 - 33.0 pg    MCHC 33.0 31.5 - 36.5 g/dL    RDW 13.2 10.0 - 15.0 %    Platelet Count 171 150 - 450 10e9/L   Erythrocyte sedimentation rate auto   Result Value Ref Range    Sed Rate 19 0 - 30 mm/h   LDL cholesterol direct   Result Value Ref Range    LDL Cholesterol Direct 98 <100 mg/dL                 ICD-10-CM        1.   Sensorineural hearing loss (SNHL) of both ears   H90.3   AUDIOLOGY ADULT REFERRAL     2.   Vitamin D insufficiency   E55.9   Cholecalciferol (VITAMIN D3)  2000 units TABS     3.   Gastroesophageal reflux disease without esophagitis   K21.9   omeprazole (PRILOSEC) 20 MG DR capsule     4.   Primary osteoarthritis of both knees   M17.0   acetaminophen (TYLENOL) 325 MG tablet     5.   Episodic tension-type headache, not intractable   G44.219   aspirin-acetaminophen-caffeine (EXCEDRIN MIGRAINE) 250-250-65 MG tablet     6.   Hyperlipidemia LDL goal <160   E78.5   atorvastatin (LIPITOR) 10 MG tablet     7.   Age related osteoporosis, unspecified pathological fracture presence   M81.0   lidocaine (XYLOCAINE) 5 % external ointment     8.   Dry eye syndrome of both eyes   H04.123   polyethylene glycol 0.4%- propylene glycol 0.3% (SYSTANE) 0.4-0.3 % SOLN ophthalmic solution              .    Side effects benefits and risks thoroughly discussed. .she may come in early if unimproved or getting worse          Please drink 2 glasses of water prior to meals and walk 15-30 minutes after meals        I spent 25 minutes with patient discussing the following issues   The primary encounter diagnosis was Sensorineural hearing loss (SNHL) of both ears. Diagnoses of Vitamin D insufficiency, Gastroesophageal reflux disease without esophagitis, Primary osteoarthritis of both knees, Episodic tension-type headache, not intractable, Hyperlipidemia LDL goal <160, Age related osteoporosis, unspecified pathological fracture presence, and Dry eye syndrome of both eyes were also pertinent to this visit. over half of which involved counseling and coordination of care.        Patient Instructions     istory of hypertension    Primary osteoarthritis of both knees    Hyperlipidemia    History of depression and anxiety    History of mild intermittent memory loss    Urinary frequency     history of pulmonary tuberculosis which has been treated    History of osteoporosis    History of osteoarthritis of both knees    History of chronic lumbar disc disease    Moderate intermittent headaches    Malaise and  fatigue    History of bloody stools    History of being overweight    History of dry eye syndrome    History of recurrent urinary tract infections    Patient has a gait abnormality    At high risk for falls    3rd to with hammer toe     Use mole skin to preven rubbing         (H90.3) Sensorineural hearing loss (SNHL) of both ears  (primary encounter diagnosis)    Comment:      Plan: AUDIOLOGY ADULT REFERRAL                   (E55.9) Vitamin D insufficiency    Comment:      Plan: Cholecalciferol (VITAMIN D3) 2000 units TABS                   (K21.9) Gastroesophageal reflux disease without esophagitis    Comment:      Plan: omeprazole (PRILOSEC) 20 MG DR capsule                   (M17.0) Primary osteoarthritis of both knees    Comment:      Plan: acetaminophen (TYLENOL) 325 MG tablet                   (G44.219) Episodic tension-type headache, not intractable    Comment:      Plan: aspirin-acetaminophen-caffeine (EXCEDRIN           MIGRAINE) 250-250-65 MG tablet                   (E78.5) Hyperlipidemia LDL goal <160    Comment:      Plan: atorvastatin (LIPITOR) 10 MG tablet                   (M81.0) Age related osteoporosis, unspecified pathological fracture presence    Comment:      Plan: lidocaine (XYLOCAINE) 5 % external ointment                   (H04.123) Dry eye syndrome of both eyes    Comment:      Plan: polyethylene glycol 0.4%- propylene glycol 0.3%          (SYSTANE) 0.4-0.3 % SOLN ophthalmic solution                  '                 ALL THE ABOVE PROBLEMS ARE STABLE AND MED CHANGES AS NOTED        Diet: Mediterranean diabetic diet        Exercise: Upper lower extremity exam low back pain exam exercises Range of motion, balance, isometric, and strengthening exercises 30 repetitions twice daily of involved joints            .GAYLA VEGA MD 4/18/2019 1:21 PM  April 18, 2019

## 2019-04-18 NOTE — PATIENT INSTRUCTIONS
istory of hypertension  Primary osteoarthritis of both knees  Hyperlipidemia  History of depression and anxiety  History of mild intermittent memory loss  Urinary frequency   history of pulmonary tuberculosis which has been treated  History of osteoporosis  History of osteoarthritis of both knees  History of chronic lumbar disc disease  Moderate intermittent headaches  Malaise and fatigue  History of bloody stools  History of being overweight  History of dry eye syndrome  History of recurrent urinary tract infections  Patient has a gait abnormality  At high risk for falls  3rd to with hammer toe   Use mole skin to preven rubbing     (H90.3) Sensorineural hearing loss (SNHL) of both ears  (primary encounter diagnosis)  Comment:    Plan: AUDIOLOGY ADULT REFERRAL             (E55.9) Vitamin D insufficiency  Comment:    Plan: Cholecalciferol (VITAMIN D3) 2000 units TABS             (K21.9) Gastroesophageal reflux disease without esophagitis  Comment:    Plan: omeprazole (PRILOSEC) 20 MG DR capsule             (M17.0) Primary osteoarthritis of both knees  Comment:    Plan: acetaminophen (TYLENOL) 325 MG tablet             (G44.219) Episodic tension-type headache, not intractable  Comment:    Plan: aspirin-acetaminophen-caffeine (EXCEDRIN         MIGRAINE) 250-250-65 MG tablet             (E78.5) Hyperlipidemia LDL goal <160  Comment:    Plan: atorvastatin (LIPITOR) 10 MG tablet             (M81.0) Age related osteoporosis, unspecified pathological fracture presence  Comment:    Plan: lidocaine (XYLOCAINE) 5 % external ointment             (H04.123) Dry eye syndrome of both eyes  Comment:    Plan: polyethylene glycol 0.4%- propylene glycol 0.3%        (SYSTANE) 0.4-0.3 % SOLN ophthalmic solution              '

## 2019-04-18 NOTE — LETTER
My Depression Action Plan  Name: Rody Gonzalez   Date of Birth 1940  Date: 4/18/2019    My doctor: Osman Yen   My clinic: [address]  [facility] [address] [phone]          GREEN    ZONE   Good Control    What it looks like:     Things are going generally well. You have normal up s and down s. You may even feel depressed from time to time, but bad moods usually last less than a day.   What you need to do:  1. Continue to care for yourself (see self care plan)  2. Check your depression survival kit and update it as needed  3. Follow your physician s recommendations including any medication.  4. Do not stop taking medication unless you consult with your physician first.           YELLOW         ZONE Getting Worse    What it looks like:     Depression is starting to interfere with your life.     It may be hard to get out of bed; you may be starting to isolate yourself from others.    Symptoms of depression are starting to last most all day and this has happened for several days.     You may have suicidal thoughts but they are not constant.   What you need to do:     1. Call your care team, your response to treatment will improve if you keep your care team informed of your progress. Yellow periods are signs an adjustment may need to be made.     2. Continue your self-care, even if you have to fake it!    3. Talk to someone in your support network    4. Open up your depression survival kit           RED    ZONE Medical Alert - Get Help    What it looks like:     Depression is seriously interfering with your life.     You may experience these or other symptoms: You can t get out of bed most days, can t work or engage in other necessary activities, you have trouble taking care of basic hygiene, or basic responsibilities, thoughts of suicide or death that will not go away, self-injurious behavior.     What you need to  do:  1. Call your care team and request a same-day appointment. If they are not available (weekends or after hours) call your local crisis line, emergency room or 911.            Depression Self Care Plan / Survival Kit    Self-Care for Depression  Here s the deal. Your body and mind are really not as separate as most people think.  What you do and think affects how you feel and how you feel influences what you do and think. This means if you do things that people who feel good do, it will help you feel better.  Sometimes this is all it takes.  There is also a place for medication and therapy depending on how severe your depression is, so be sure to consult with your medical provider and/ or Behavioral Health Consultant if your symptoms are worsening or not improving.     In order to better manage my stress, I will:    Exercise  Get some form of exercise, every day. This will help reduce pain and release endorphins, the  feel good  chemicals in your brain. This is almost as good as taking antidepressants!  This is not the same as joining a gym and then never going! (they count on that by the way ) It can be as simple as just going for a walk or doing some gardening, anything that will get you moving.      Hygiene   Maintain good hygiene (Get out of bed in the morning, Make your bed, Brush your teeth, Take a shower, and Get dressed like you were going to work, even if you are unemployed).  If your clothes don't fit try to get ones that do.    Diet  I will strive to eat foods that are good for me, drink plenty of water, and avoid excessive sugar, caffeine, alcohol, and other mood-altering substances.  Some foods that are helpful in depression are: complex carbohydrates, B vitamins, flaxseed, fish or fish oil, fresh fruits and vegetables.    Psychotherapy  I agree to participate in Individual Therapy (if recommended).    Medication  If prescribed medications, I agree to take them.  Missing doses can result in serious  side effects.  I understand that drinking alcohol, or other illicit drug use, may cause potential side effects.  I will not stop my medication abruptly without first discussing it with my provider.    Staying Connected With Others  I will stay in touch with my friends, family members, and my primary care provider/team.    Use your imagination  Be creative.  We all have a creative side; it doesn t matter if it s oil painting, sand castles, or mud pies! This will also kick up the endorphins.    Witness Beauty  (AKA stop and smell the roses) Take a look outside, even in mid-winter. Notice colors, textures. Watch the squirrels and birds.     Service to others  Be of service to others.  There is always someone else in need.  By helping others we can  get out of ourselves  and remember the really important things.  This also provides opportunities for practicing all the other parts of the program.    Humor  Laugh and be silly!  Adjust your TV habits for less news and crime-drama and more comedy.    Control your stress  Try breathing deep, massage therapy, biofeedback, and meditation. Find time to relax each day.     My support system    Clinic Contact:  Phone number:    Contact 1:  Phone number:    Contact 2:  Phone number:    Jew/:  Phone number:    Therapist:  Phone number:    Local crisis center:    Phone number:    Other community support:  Phone number:

## 2019-04-30 ENCOUNTER — OFFICE VISIT (OUTPATIENT)
Dept: AUDIOLOGY | Facility: CLINIC | Age: 79
End: 2019-04-30
Attending: FAMILY MEDICINE
Payer: COMMERCIAL

## 2019-04-30 DIAGNOSIS — H90.6 MIXED HEARING LOSS, BILATERAL: Primary | ICD-10-CM

## 2019-04-30 NOTE — PROGRESS NOTES
AUDIOLOGY REPORT    SUBJECTIVE:  Rody Gonzalez is a 79 year old female who was seen in the Audiology Clinic at the [facility] for audiologic evaluation, referred by Osman Yen M.D. The patient reports decreased hearing over the past several years. Feels right ear hearing is worse. Also reports dizziness, and pain (3-4 in both ears). She denies tinnitus and previous ear surgeries.  The patient notes difficulty with communication in a variety of listening situations. They were accompanied today by their daughter and .    OBJECTIVE:  Abuse Screening:  Do you feel unsafe at home or work/school? No  Do you feel threatened by someone? No  Does anyone try to keep you from having contact with others, or doing things outside of your home? No  Physical signs of abuse present? No     Fall Risk Screen:  1. Have you fallen two or more times in the past year? No  2. Have you fallen and had an injury in the past year? No    Otoscopic exam indicates ears are clear of cerumen bilaterally  Note: pt has small ear canals    Pure Tone Thresholds assessed using conventional audiometry with good  reliability from 250-8000 Hz bilaterally using insert earphones and circumaural headphones     RIGHT:  moderate sloping to profound mixed hearing loss    LEFT:    moderate sloping to profound mixed hearing loss    Tympanogram:    RIGHT: normal eardrum mobility    LEFT:   normal eardrum mobility    Reflexes (reported by stimulus ear):  RIGHT: Ipsilateral is absent at frequencies tested  RIGHT: Contralateral is absent at frequencies tested  LEFT:   Ipsilateral is absent at frequencies tested  LEFT:   Contralateral is absent at frequencies tested      Speech Detection Threshold:    RIGHT: 50 dB HL (in agreement with best thresholds)    LEFT:   35 dB HL (in agreement with best thresholds)  Word Recognition Score:     RIGHT: 76% at 100 dB HL using live voice with a [place]     LEFT:    88% at 85 dB HL using live voice with a [place]       ASSESSMENT:  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 Today s results were discussed with the patient in detail.     PLAN:  Patient was counseled regarding hearing loss and impact on communication.  It is recommended that the patient be evaluated by ENT services for mixed hearing loss and return for a hearing aid consult if warranted.  Please call this clinic with questions regarding these results or recommendations.        Caren Kelley., [facility]  Licensed Audiologist  [place] #[id number]

## 2019-05-01 ENCOUNTER — OFFICE VISIT (OUTPATIENT)
Dept: FAMILY MEDICINE | Facility: CLINIC | Age: 79
End: 2019-05-01
Payer: COMMERCIAL

## 2019-05-01 VITALS
SYSTOLIC BLOOD PRESSURE: 140 MMHG | TEMPERATURE: 98.1 F | HEART RATE: 61 BPM | OXYGEN SATURATION: 94 % | WEIGHT: 160 LBS | RESPIRATION RATE: 14 BRPM | BODY MASS INDEX: 27.04 KG/M2 | DIASTOLIC BLOOD PRESSURE: 82 MMHG

## 2019-05-01 DIAGNOSIS — R53.83 MALAISE AND FATIGUE: ICD-10-CM

## 2019-05-01 DIAGNOSIS — H90.3 SENSORINEURAL HEARING LOSS (SNHL) OF BOTH EARS: ICD-10-CM

## 2019-05-01 DIAGNOSIS — R53.81 MALAISE AND FATIGUE: ICD-10-CM

## 2019-05-01 DIAGNOSIS — Z91.81 RISK FOR FALLS: ICD-10-CM

## 2019-05-01 DIAGNOSIS — K21.9 GASTROESOPHAGEAL REFLUX DISEASE WITHOUT ESOPHAGITIS: Primary | Chronic | ICD-10-CM

## 2019-05-01 PROCEDURE — 99214 OFFICE O/P EST MOD 30 MIN: CPT | Performed by: FAMILY MEDICINE

## 2019-05-01 RX ORDER — PANTOPRAZOLE SODIUM 40 MG/1
40 TABLET, DELAYED RELEASE ORAL DAILY
Qty: 90 TABLET | Refills: 1 | Status: SHIPPED | OUTPATIENT
Start: 2019-05-01 | End: 2019-07-11

## 2019-05-01 RX ORDER — PANTOPRAZOLE SODIUM 40 MG/1
40 TABLET, DELAYED RELEASE ORAL DAILY
COMMUNITY
End: 2019-05-01

## 2019-05-01 NOTE — PROGRESS NOTES
SUBJECTIVE:   Rody Gonzalez is a 79 year old female who presents to clinic today for the following   health issues:    GERD/Heartburn      Duration: years    Description (location/character/radiation): heartburn, reflux    Intensity:  moderate    Accompanying signs and symptoms:  food getting stuck: no   nausea/vomiting/blood: no   abdominal pain: YES- intermittent  black/tarry or bloody stools: no :    History (similar episodes/previous evaluation): has had ongoing problems    Precipitating or alleviating factors:  worse with fatty foods.  current NSAID/Aspirin use: no     Therapies tried and outcome: Protonix    Pt has tried Omeprazole but that did not work as well.  Protonix works better       Hyperlipidemia Follow-Up      Rate your low fat/cholesterol diet?: fair    Taking statin?  Yes, no muscle aches from statin    Other lipid medications/supplements?:  none    Chronic Pain Follow-Up       Type / Location of Pain: upper back and shoulders  Analgesia/pain control:       Recent changes:  improved      Overall control: Tolerable with discomfort  Activity level/function:      Daily activities:  Able to do light housework, cooking    Work:  not applicable  Adverse effects:  No  Adherance    Taking medication as directed?  yes    Participating in other treatments: not applicable  Risk Factors:    Sleep:  Fair    Mood/anxiety:  controlled    Recent family or social stressors:  none noted    Other aggravating factors: none  PHQ-9 SCORE 2/13/2019 2/19/2019 4/18/2019   PHQ-9 Total Score - - -   PHQ-9 Total Score MyChart 2 (Minimal depression) - -   PHQ-9 Total Score 2 7 0     ANA LAURA-7 SCORE 4/3/2018 4/20/2018 2/13/2019   Total Score 0 (minimal anxiety) - 0 (minimal anxiety)   Total Score 0 0 0     Encounter-Level CSA:    There are no encounter-level csa.     Patient-Level CSA:    There are no patient-level csa.      Shoulder and upper back pain worse when her stomach symptoms are worse        Amount of exercise or  physical activity: stretching    Problems taking medications regularly: No    Medication side effects: none    Diet: low cholesterol            Additional history: as documented    Reviewed  and updated as needed this visit by clinical staff  Tobacco  Allergies  Meds  Problems  Med Hx  Surg Hx  Fam Hx  Soc Hx          Reviewed and updated as needed this visit by Provider  Tobacco  Allergies  Meds  Problems  Med Hx  Surg Hx  Fam Hx         Labs reviewed in EPIC    ROS:  CONSTITUTIONAL:NEGATIVE for fever, chills, change in weight  RESP:NEGATIVE for significant cough or SOB  CV: NEGATIVE for chest pain, palpitations or peripheral edema  GI: POSITIVE for gas or bloating and heartburn or reflux  MUSCULOSKELETAL: POSITIVE  for arthralgias shoulders and neck pain    OBJECTIVE:                                                    /82   Pulse 61   Temp 98.1  F (36.7  C) (Tympanic)   Resp 14   Wt 72.6 kg (160 lb)   LMP  (LMP Unknown)   SpO2 94%   BMI 27.04 kg/m    Body mass index is 27.04 kg/m .  GENERAL APPEARANCE: healthy, alert and no distress  RESP: lungs clear to auscultation - no rales, rhonchi or wheezes  CV: regular rates and rhythm, normal S1 S2, no S3 or S4 and no murmur, click or rub  ABDOMEN: bowel sounds normal, no hepatosplenomegaly or masses and tender in epigastric area  MS: extremities normal- no gross deformities noted    Diagnostic test results:  none      ASSESSMENT/PLAN:                                                        ICD-10-CM    1. Gastroesophageal reflux disease without esophagitis K21.9 pantoprazole (PROTONIX) 40 MG EC tablet   2. Sensorineural hearing loss (SNHL) of both ears H90.3    3. Malaise and fatigue R53.81     R53.83    4. Risk for falls Z91.81        Follow up with Provider - 3 mo    Patient Instructions   Change back to the Pantoprazole 40 mg daily  Alternate ice & heat.  Use Ice massage on trigger point areas.  Do gentle Range of motion exercises      Kevin  MD Sukhjinder  [facility]

## 2019-05-01 NOTE — PATIENT INSTRUCTIONS
Change back to the Pantoprazole 40 mg daily  Alternate ice & heat.  Use Ice massage on trigger point areas.  Do gentle Range of motion exercises

## 2019-05-23 NOTE — TELEPHONE ENCOUNTER
FUTURE VISIT INFORMATION      FUTURE VISIT INFORMATION:    Date: 6/25/19    Time: 11:30AM    Location: [facility]  REFERRAL INFORMATION:    Referring provider:  Marcelina Rosales    Referring providers clinic:  [facility]     Reason for visit/diagnosis  hearing loss     RECORDS REQUESTED FROM:       Clinic name Comments Records Status Imaging Status   [facility]  4/30/19 Audio with Marcelina BACH    [facility] 4/18/19, 6/15/17, 10/08/15 notes with Dr Osman BACH

## 2019-06-25 ENCOUNTER — PRE VISIT (OUTPATIENT)
Dept: OTOLARYNGOLOGY | Facility: CLINIC | Age: 79
End: 2019-06-25

## 2019-06-25 ENCOUNTER — OFFICE VISIT (OUTPATIENT)
Dept: OTOLARYNGOLOGY | Facility: CLINIC | Age: 79
End: 2019-06-25
Payer: COMMERCIAL

## 2019-06-25 VITALS
DIASTOLIC BLOOD PRESSURE: 81 MMHG | RESPIRATION RATE: 18 BRPM | HEIGHT: 63 IN | WEIGHT: 157 LBS | HEART RATE: 66 BPM | BODY MASS INDEX: 27.82 KG/M2 | SYSTOLIC BLOOD PRESSURE: 172 MMHG | TEMPERATURE: 98.1 F

## 2019-06-25 DIAGNOSIS — H90.6 MIXED HEARING LOSS, BILATERAL: Primary | ICD-10-CM

## 2019-06-25 ASSESSMENT — MIFFLIN-ST. JEOR: SCORE: 1162.4

## 2019-06-25 ASSESSMENT — PAIN SCALES - GENERAL: PAINLEVEL: NO PAIN (0)

## 2019-06-25 NOTE — LETTER
Hearing Aid Medical Clearance    Rody Gonzalez  June 25, 2019        This patient has received a medical examination and may be considered a suitable candidate for a hearing aid.         Physician:__________________________________________________

## 2019-06-25 NOTE — NURSING NOTE
"Chief Complaint   Patient presents with     Consult     mixed hearing loss      Blood pressure 172/81, pulse 66, temperature 98.1  F (36.7  C), resp. rate 18, height 1.61 m (5' 3.39\"), weight 71.2 kg (157 lb), not currently breastfeeding.    Elian Freedman LPN    "

## 2019-06-25 NOTE — PATIENT INSTRUCTIONS
1. You were seen in the ENT Clinic today by Dr. Mark.  If you have any questions or concerns after your appointment, please call   - Option 1: ENT Clinic: [phone]  - Option 2: [facility] (Dr. Mark Nurse): [phone]    2.   Plan to return to clinic as needed    3. You may schedule a hearing aid consult at your convenience.    Natice Schwab, RN  [facility] Otolaryngology  [phone]

## 2019-06-25 NOTE — LETTER
6/25/2019       RE: Rody Gonzalez  [address]  [facility] [address]     Dear Colleague,    Thank you for referring your patient, Rody Gonzalez, to the [facility] at [facility]. Please see a copy of my visit note below.    Referring Provider: Dr. Tolentino     June 25, 2019    I had the pleasure of meeting Rody Gonzalez in consultation today at the [facility].    HISTORY OF PRESENT ILLNESS: Ms. Gonzalez is a 79 year old female who presents with mixed hearing loss. The patient reports many years gradually worsening bilateral hearing loss. She feels her right ear is her worse ear. She cites intermittent episodes of itching in the right ear that resolve spontaneously but denies any otorrhea or sharp otalgia. She has occasional headaches but reports that these do not seem to be related to her ear problems. She denies any history of ear infections as a child. She denies any prior surgery or procedures on her ears. She denies any vertigo, tinnitus, fevers, chills, or other symptoms.    PMHx:  Hypertension  Hyperlipidemia  Pulmonary TB s/p treatment  Osteoarthritis    Past Surgical History:   Procedure Laterality Date     HYSTERECTOMY  2004       Allergies   Allergen Reactions     Isoniazid      Pyrazinamide        Current Outpatient Medications   Medication     acetaminophen (TYLENOL) 325 MG tablet     aspirin-acetaminophen-caffeine (EXCEDRIN MIGRAINE) 250-250-65 MG tablet     atorvastatin (LIPITOR) 10 MG tablet     Cholecalciferol (VITAMIN D3) 2000 units TABS     lidocaine (XYLOCAINE) 5 % external ointment     pantoprazole (PROTONIX) 40 MG EC tablet     polyethylene glycol 0.4%- propylene glycol 0.3% (SYSTANE) 0.4-0.3 % SOLN ophthalmic solution     No current facility-administered medications for this visit.        SOCIAL HISTORY:   Social History     Tobacco Use     Smoking status: Never Smoker     Smokeless  "tobacco: Never Used   Substance Use Topics     Alcohol use: No     Drug use: No     FAMILY HISTORY:   Family History   Problem Relation Age of Onset     Family History Negative Mother      Family History Negative Father      Diabetes No family hx of      Coronary Artery Disease No family hx of      Hypertension No family hx of      Hyperlipidemia No family hx of      Cerebrovascular Disease No family hx of      Breast Cancer No family hx of      Colon Cancer No family hx of      Prostate Cancer No family hx of      Other Cancer No family hx of      Depression No family hx of      Anxiety Disorder No family hx of      Mental Illness No family hx of      Substance Abuse No family hx of      Anesthesia Reaction No family hx of      Asthma No family hx of      Osteoporosis No family hx of      Genetic Disorder No family hx of      Thyroid Disease No family hx of      Obesity No family hx of      Unknown/Adopted No family hx of         REVIEW OF SYSTEMS:  10 point review of systems performed and negative other than as stated above.    PHYSICIAL EXAMINATION:  /81   Pulse 66   Temp 98.1  F (36.7  C)   Resp 18   Ht 1.61 m (5' 3.39\")   Wt 71.2 kg (157 lb)   LMP  (LMP Unknown)   BMI 27.47 kg/m     Constitutional: The patient was well-groomed and in no acute distress.   Psychiatric: The patient's affect was calm, cooperative, and appropriate.   Respiratory: Breathing comfortably without stridor or exertion of accessory muscles.  Eyes: Pupils were equal and reactive. Extraocular movement intact.   Head: Normocephalic and atraumatic. No lesions or scars.  Ears: The ears were examined under the otomicroscope. The EACs are small but were patent and lined with normal epithelium. The tympanic membranes were clear without retraction, effusion, perforation, or inflammation.  Nose: Sinuses were nontender.  Oral Cavity: Normal tongue, floor of moth, buccal mucosa, and palate. No lesions or masses on inspection or palpation. " No abnormal lymph tissue in the oropharynx.   Neck: Supple with normal laryngeal and tracheal landmarks.    Neurologic: Alert and oriented x 3. Cranial nerves III-XI within normal limits. Voice quality normal.    Audiogram:  Moderate down sloping to severe mixed hearing loss bilaterally, right worse than left, with 30 dB air-bone gap on the right and 10 dB air-bone gap on the left at 1000 Hz. Type A tympanograms bilaterally. Acoustic reflexes absent bilaterally.    IMPRESSION AND PLAN: Rody Gonzalez is a 79 year old woman presenting with bilateral mixed hearing loss. Her physical exam is normal other than narrow EACs with no retraction pocket, effusion, or perforation to explain conductive component of her hearing loss. Her absent acoustic reflexes are suggestive of ossicular chain fixation or otosclerosis. These findings were discussed with the patient and her family member via an . The options of obtaining further imaging to characterize the cause of the conductive component of her hearing loss vs proceeding with hearing aids were discussed. The patient wishes to proceed with hearing aids. The plan will be for her to follow up with audiology for hearing aid fitting. She can return to our clinic if she develops any new or concerning symptoms.    Thank you very much for the opportunity to participate in the care of your patient.    Olu Grove MD  Otolaryngology-Head & Neck Surgery PGY-2  Please contact ENT by dialing [phone] and entering job code [phone].     Ramila Mark MD  Otology & Neurotology  [facility]    I, Ramila Mark, saw this patient with the resident/fellow and agree with the resident s findings and plan of care as documented in the resident s/fellow s note. I was present for the entire procedure.        CC: Osman Yen      Again, thank you for allowing me to participate in the care of your patient.      Sincerely,    Ramila Mark,  MD

## 2019-06-25 NOTE — PROGRESS NOTES
Referring Provider: Dr. Tolentino     June 25, 2019    I had the pleasure of meeting Rody Gonzalez in consultation today at the [facility].    HISTORY OF PRESENT ILLNESS: Ms. Gonzalez is a 79 year old female who presents with mixed hearing loss. The patient reports many years gradually worsening bilateral hearing loss. She feels her right ear is her worse ear. She cites intermittent episodes of itching in the right ear that resolve spontaneously but denies any otorrhea or sharp otalgia. She has occasional headaches but reports that these do not seem to be related to her ear problems. She denies any history of ear infections as a child. She denies any prior surgery or procedures on her ears. She denies any vertigo, tinnitus, fevers, chills, or other symptoms.    PMHx:  Hypertension  Hyperlipidemia  Pulmonary TB s/p treatment  Osteoarthritis    Past Surgical History:   Procedure Laterality Date     HYSTERECTOMY  2004       Allergies   Allergen Reactions     Isoniazid      Pyrazinamide        Current Outpatient Medications   Medication     acetaminophen (TYLENOL) 325 MG tablet     aspirin-acetaminophen-caffeine (EXCEDRIN MIGRAINE) 250-250-65 MG tablet     atorvastatin (LIPITOR) 10 MG tablet     Cholecalciferol (VITAMIN D3) 2000 units TABS     lidocaine (XYLOCAINE) 5 % external ointment     pantoprazole (PROTONIX) 40 MG EC tablet     polyethylene glycol 0.4%- propylene glycol 0.3% (SYSTANE) 0.4-0.3 % SOLN ophthalmic solution     No current facility-administered medications for this visit.        SOCIAL HISTORY:   Social History     Tobacco Use     Smoking status: Never Smoker     Smokeless tobacco: Never Used   Substance Use Topics     Alcohol use: No     Drug use: No     FAMILY HISTORY:   Family History   Problem Relation Age of Onset     Family History Negative Mother      Family History Negative Father      Diabetes No family hx of      Coronary Artery Disease No family hx of       "Hypertension No family hx of      Hyperlipidemia No family hx of      Cerebrovascular Disease No family hx of      Breast Cancer No family hx of      Colon Cancer No family hx of      Prostate Cancer No family hx of      Other Cancer No family hx of      Depression No family hx of      Anxiety Disorder No family hx of      Mental Illness No family hx of      Substance Abuse No family hx of      Anesthesia Reaction No family hx of      Asthma No family hx of      Osteoporosis No family hx of      Genetic Disorder No family hx of      Thyroid Disease No family hx of      Obesity No family hx of      Unknown/Adopted No family hx of         REVIEW OF SYSTEMS:  10 point review of systems performed and negative other than as stated above.    PHYSICIAL EXAMINATION:  /81   Pulse 66   Temp 98.1  F (36.7  C)   Resp 18   Ht 1.61 m (5' 3.39\")   Wt 71.2 kg (157 lb)   LMP  (LMP Unknown)   BMI 27.47 kg/m    Constitutional: The patient was well-groomed and in no acute distress.   Psychiatric: The patient's affect was calm, cooperative, and appropriate.   Respiratory: Breathing comfortably without stridor or exertion of accessory muscles.  Eyes: Pupils were equal and reactive. Extraocular movement intact.   Head: Normocephalic and atraumatic. No lesions or scars.  Ears: The ears were examined under the otomicroscope. The EACs are small but were patent and lined with normal epithelium. The tympanic membranes were clear without retraction, effusion, perforation, or inflammation.  Nose: Sinuses were nontender.  Oral Cavity: Normal tongue, floor of moth, buccal mucosa, and palate. No lesions or masses on inspection or palpation. No abnormal lymph tissue in the oropharynx.   Neck: Supple with normal laryngeal and tracheal landmarks.    Neurologic: Alert and oriented x 3. Cranial nerves III-XI within normal limits. Voice quality normal.    Audiogram:  Moderate down sloping to severe mixed hearing loss bilaterally, right worse " than left, with 30 dB air-bone gap on the right and 10 dB air-bone gap on the left at 1000 Hz. Type A tympanograms bilaterally. Acoustic reflexes absent bilaterally.    IMPRESSION AND PLAN: Rody Gonzalez is a 79 year old woman presenting with bilateral mixed hearing loss. Her physical exam is normal other than narrow EACs with no retraction pocket, effusion, or perforation to explain conductive component of her hearing loss. Her absent acoustic reflexes are suggestive of ossicular chain fixation or otosclerosis. These findings were discussed with the patient and her family member via an . The options of obtaining further imaging to characterize the cause of the conductive component of her hearing loss vs proceeding with hearing aids were discussed. The patient wishes to proceed with hearing aids. The plan will be for her to follow up with audiology for hearing aid fitting. She can return to our clinic if she develops any new or concerning symptoms.    Thank you very much for the opportunity to participate in the care of your patient.    Olu Grove MD  Otolaryngology-Head & Neck Surgery PGY-2  Please contact ENT by dialing [phone] and entering job code [phone].     Ramila Mark MD  Otology & Neurotology  [facility]    I, Ramila Mark, saw this patient with the resident/fellow and agree with the resident s findings and plan of care as documented in the resident s/fellow s note. I was present for the entire procedure.        CC: Osman Yen

## 2019-06-28 ENCOUNTER — OFFICE VISIT (OUTPATIENT)
Dept: AUDIOLOGY | Facility: CLINIC | Age: 79
End: 2019-06-28
Attending: OTOLARYNGOLOGY
Payer: COMMERCIAL

## 2019-06-28 DIAGNOSIS — H90.6 MIXED HEARING LOSS, BILATERAL: Primary | ICD-10-CM

## 2019-06-28 NOTE — PROGRESS NOTES
AUDIOLOGY REPORT    SUBJECTIVE: Rody Gonzalez is a 79 year old female was seen in the Audiology Clinic at  [facility] on 6/28/19 to discuss concerns with hearing and functional communication difficulties. The patient was accompanied by their . Rody has been seen previously on 04/30/2019, and results revealed a moderate to profound mixed hearing bilaterally with right ear worse.. The patient was medically evaluated and determined to be cleared for binaural hearing aids by Ramila Mark MD. Rody notes difficulty with communication in a variety of listening situations.Records indicate that Rody was fit with a hearing aid in 2016 at a different clinic. She reports she had one for her right ear but lost it. She is interested in a hearing aid for both ears.    OBJECTIVE:  Patient is a hearing aid candidate. Patient would like to move forward with a hearing aid evaluation today. Therefore, the patient was presented with different options for amplification to help aid in communication. Discussed styles, levels of technology and monaural vs. binaural fitting.     The hearing aid(s) mutually chosen were:  Binaural: Resound Linx 3D 7 in-the-ear (ITE) We will attempt half shell size however due to stenotic ear canals we may need to go to a full shell.  COLOR: Dark  BATTERY SIZE: 312    Otoscopy revealed ears are clear of cerumen bilaterally. Bilateral earmolds were taken without incident.    ASSESSMENT:   No diagnosis found.    Reviewed purchase information and warranty information with patient. The 45 day trial period was explained to patient. The patient was given a copy of the [place] Department [employer] consumer brochure on purchasing hearing instruments. Patient risk factors have been provided to the patient in writing prior to the sale of the hearing aid per FDA regulation. The risk factors are also available in the User Instructional Booklet to be presented on the day  of the hearing aid fitting.  Hearing aid evaluation completed.    It is noted that patient will need  Prior Authorization in order to get new hearing aids. I will submit this to her insurance.     PLAN: Rody will be called when we hear from her insurance and if approved we will then schedule  a hearing aid fitting and programming. Purchase agreement will be completed on that date. Please contact this clinic with any questions or concerns.          Caren Kelley., [facility]  Licensed Audiologist  [place] #[id number]

## 2019-07-08 DIAGNOSIS — I10 ESSENTIAL HYPERTENSION, BENIGN: ICD-10-CM

## 2019-07-08 DIAGNOSIS — G44.219 EPISODIC TENSION-TYPE HEADACHE, NOT INTRACTABLE: ICD-10-CM

## 2019-07-08 NOTE — TELEPHONE ENCOUNTER
"Requested Prescriptions   Pending Prescriptions Disp Refills     metoprolol succinate ER (TOPROL-XL) 25 MG 24 hr tablet [Pharmacy Med Name: METOPROLOL SUCCINATE ER 25MG ER TABLET ER 24HR]  DISCONTINUED  Last Written Prescription Date:  4/3/2018 END: 2/13/2019  Last Fill Quantity: 45 tablet,  # refills: 3   Last office visit: 5/1/2019 with prescribing provider:  Sukhjinder   Future Office Visit:   Next 5 appointments (look out 90 days)    Jul 11, 2019 11:30 AM CDT  Office Visit with Kevin Slaughter MD, [employer]  [facility] ([facility]) [address]  [place] [address] [phone]          45 tablet 3     Sig: TAKE 0.5 TABLETS (12.5 MG) BY MOUTH AT BEDTIME       Beta-Blockers Protocol Failed - 7/8/2019 11:23 AM        Failed - Blood pressure under 140/90 in past 12 months     BP Readings from Last 3 Encounters:   06/25/19 172/81   05/01/19 140/82   04/18/19 126/68                 Failed - Medication is active on med list        Passed - Patient is age 6 or older        Passed - Recent (12 mo) or future (30 days) visit within the authorizing provider's specialty     Patient had office visit in the last 12 months or has a visit in the next 30 days with authorizing provider or within the authorizing provider's specialty.  See \"Patient Info\" tab in inbasket, or \"Choose Columns\" in Meds & Orders section of the refill encounter.                 "

## 2019-07-11 ENCOUNTER — OFFICE VISIT (OUTPATIENT)
Dept: FAMILY MEDICINE | Facility: CLINIC | Age: 79
End: 2019-07-11
Payer: COMMERCIAL

## 2019-07-11 VITALS
HEART RATE: 74 BPM | WEIGHT: 157 LBS | BODY MASS INDEX: 27.47 KG/M2 | SYSTOLIC BLOOD PRESSURE: 104 MMHG | TEMPERATURE: 98.5 F | DIASTOLIC BLOOD PRESSURE: 70 MMHG | RESPIRATION RATE: 16 BRPM | OXYGEN SATURATION: 98 %

## 2019-07-11 DIAGNOSIS — M81.0 AGE-RELATED OSTEOPOROSIS WITHOUT CURRENT PATHOLOGICAL FRACTURE: ICD-10-CM

## 2019-07-11 DIAGNOSIS — R26.89 BALANCE PROBLEMS: ICD-10-CM

## 2019-07-11 DIAGNOSIS — M51.369 DDD (DEGENERATIVE DISC DISEASE), LUMBAR: Chronic | ICD-10-CM

## 2019-07-11 DIAGNOSIS — K21.9 GASTROESOPHAGEAL REFLUX DISEASE WITHOUT ESOPHAGITIS: Chronic | ICD-10-CM

## 2019-07-11 DIAGNOSIS — Z91.81 RISK FOR FALLS: ICD-10-CM

## 2019-07-11 DIAGNOSIS — E55.9 VITAMIN D DEFICIENCY DISEASE: Primary | ICD-10-CM

## 2019-07-11 LAB — ERYTHROCYTE [SEDIMENTATION RATE] IN BLOOD BY WESTERGREN METHOD: 24 MM/H (ref 0–30)

## 2019-07-11 PROCEDURE — 82306 VITAMIN D 25 HYDROXY: CPT | Performed by: FAMILY MEDICINE

## 2019-07-11 PROCEDURE — 36415 COLL VENOUS BLD VENIPUNCTURE: CPT | Performed by: FAMILY MEDICINE

## 2019-07-11 PROCEDURE — 99214 OFFICE O/P EST MOD 30 MIN: CPT | Performed by: FAMILY MEDICINE

## 2019-07-11 PROCEDURE — 85652 RBC SED RATE AUTOMATED: CPT | Performed by: FAMILY MEDICINE

## 2019-07-11 RX ORDER — CHLORAL HYDRATE 500 MG
CAPSULE ORAL
Refills: 11 | COMMUNITY
Start: 2019-05-06 | End: 2019-11-07

## 2019-07-11 RX ORDER — MULTIVITAMIN/IRON/FOLIC ACID 18MG-0.4MG
TABLET ORAL
Refills: 11 | COMMUNITY
Start: 2019-05-07 | End: 2019-08-23

## 2019-07-11 RX ORDER — PANTOPRAZOLE SODIUM 40 MG/1
40 TABLET, DELAYED RELEASE ORAL DAILY
Qty: 90 TABLET | Refills: 1 | Status: SHIPPED | OUTPATIENT
Start: 2019-07-11 | End: 2019-11-07

## 2019-07-11 RX ORDER — METOPROLOL SUCCINATE 25 MG/1
TABLET, EXTENDED RELEASE ORAL
COMMUNITY
Start: 2019-04-12 | End: 2019-08-23

## 2019-07-11 RX ORDER — DIPHENOXYLATE HYDROCHLORIDE AND ATROPINE SULFATE 2.5; .025 MG/1; MG/1
TABLET ORAL
Refills: 11 | COMMUNITY
Start: 2019-04-08 | End: 2020-02-20

## 2019-07-11 NOTE — PROGRESS NOTES
Subjective     Rody Gonzalez is a 79 year old female who presents to clinic today for the following health issues:    HPI     Additional: pt brings in supplement for bone strength. Wants to make sure its okay to take.     Due to language barrier, an  was present during the history-taking and subsequent discussion (and for part of the physical exam) with this patient.    Patient is accompanied by both  and daughter.    Musculoskeletal problem/pain      Duration: ongoing balance issues    Description  Location: multiple joints-low back, hips, knees, etc    Intensity:  severe    Accompanying signs and symptoms: none    History  Previous similar problem: YES  Previous evaluation:  YES    Precipitating or alleviating factors:  Trauma or overuse: no   Aggravating factors include: none    Therapies tried and outcome: Lidocaine, tylenol      GERD/Heartburn      Duration: years    Description (location/character/radiation): heartburn, reflux    Intensity:  moderate    Accompanying signs and symptoms:  food getting stuck: no   nausea/vomiting/blood: no   abdominal pain: YES- intermittent  black/tarry or bloody stools: no :    History (similar episodes/previous evaluation): has had ongoing problems    Precipitating or alleviating factors:  worse with fatty foods.  current NSAID/Aspirin use: no     Therapies tried and outcome: Protonix    Pt has tried Omeprazole but that did not work as well.  Protonix works better       Chronic Pain Follow-Up       Type / Location of Pain: upper back and shoulders  Analgesia/pain control:       Recent changes:  improved      Overall control: Tolerable with discomfort  Activity level/function:      Daily activities:  Able to do light housework, cooking    Work:  not applicable  Adverse effects:  No  Adherance    Taking medication as directed?  yes    Participating in other treatments: not applicable  Risk Factors:    Sleep:  Fair    Mood/anxiety:  controlled    Recent  family or social stressors:  none noted    Other aggravating factors: none  PHQ-9 SCORE 2/13/2019 2/19/2019 4/18/2019   PHQ-9 Total Score - - -   PHQ-9 Total Score MyChart 2 (Minimal depression) - -   PHQ-9 Total Score 2 7 0     ANA LAURA-7 SCORE 4/3/2018 4/20/2018 2/13/2019   Total Score 0 (minimal anxiety) - 0 (minimal anxiety)   Total Score 0 0 0     Encounter-Level CSA:    There are no encounter-level csa.     Patient-Level CSA:    There are no patient-level csa.      Shoulder and upper back pain worse when her stomach symptoms are worse        Amount of exercise or physical activity: stretching    Problems taking medications regularly: No    Medication side effects: none    Diet: low cholesterol            BP Readings from Last 3 Encounters:   07/11/19 104/70   06/25/19 172/81   05/01/19 140/82    Wt Readings from Last 3 Encounters:   07/11/19 71.2 kg (157 lb)   06/25/19 71.2 kg (157 lb)   05/01/19 72.6 kg (160 lb)                      Reviewed and updated as needed this visit by Provider         Review of Systems   ROS COMP: CONSTITUTIONAL: NEGATIVE for fever, chills, change in weight  ENT/MOUTH: NEGATIVE for ear, mouth and throat problems  RESP: NEGATIVE for significant cough or SOB  CV: NEGATIVE for chest pain, palpitations or peripheral edema  GI: POSITIVE for heartburn or reflux and Hx GERD  MUSCULOSKELETAL: POSITIVE  for back pain low back and neck pain  NEURO: POSITIVE for gait disturbance      Objective    /70 (BP Location: Left arm, Patient Position: Sitting, Cuff Size: Adult Regular)   Pulse 74   Temp 98.5  F (36.9  C) (Tympanic)   Resp 16   Wt 71.2 kg (157 lb)   LMP  (LMP Unknown)   SpO2 98%   BMI 27.47 kg/m    Body mass index is 27.47 kg/m .  Physical Exam   GENERAL: healthy, alert and no distress  NECK: no adenopathy, no asymmetry, masses, or scars and thyroid normal to palpation  RESP: lungs clear to auscultation - no rales, rhonchi or wheezes  CV: regular rate and rhythm, normal S1 S2, no  "S3 or S4, no murmur, click or rub, no peripheral edema and peripheral pulses strong  ABDOMEN: soft, nontender, no hepatosplenomegaly, no masses and bowel sounds normal  MS: spine exam shows no scoliosis and tenderness paraspinal muscles bilaterally   NEURO: Normal strength and tone, sensory exam grossly normal, mentation intact, gait abnormal: unsteady and Romberg normal    Diagnostic Test Results:  Labs reviewed in [facility]  Lab: see below, results pending        Assessment & Plan     Rody was seen today for back pain.    Diagnoses and all orders for this visit:    Vitamin D deficiency disease  -     Vitamin D Deficiency    Gastroesophageal reflux disease without esophagitis  -     pantoprazole (PROTONIX) 40 MG EC tablet; Take 1 tablet (40 mg) by mouth daily    Risk for falls    DDD (degenerative disc disease), lumbar  -     ESR: Erythrocyte sedimentation rate    Age-related osteoporosis without current pathological fracture  -     DX Hip/Pelvis/Spine; Future  -     RADIOLOGY REFERRAL    Balance problems         BMI:   Estimated body mass index is 27.47 kg/m  as calculated from the following:    Height as of 6/25/19: 1.61 m (5' 3.39\").    Weight as of this encounter: 71.2 kg (157 lb).   Weight management plan: Discussed healthy diet and exercise guidelines        FUTURE APPOINTMENTS:       - Follow-up visit in 3 mo  Refer to [facility] for DEXA scan  Patient Instructions   [facility] Radiology number for scheduling  [phone]      Return in about 3 months (around 10/11/2019) for back pain, balance problems.    Kevin Slaughter MD  [facility]        "

## 2019-07-11 NOTE — LETTER
July 15, 2019      Tahirethel Gonzalez  [address]   [place] [address]        Dear ,    We are writing to inform you of your test results.    Vitamin D level is in low normal range. Take Vitamin D 2000 units daily  Sed rate is normal, no evidence of inflammation    Resulted Orders   Vitamin D Deficiency   Result Value Ref Range    Vitamin D Deficiency screening 31 20 - 75 ug/L      Comment:      Season, race, dietary intake, and treatment affect the concentration of   25-hydroxy-Vitamin D. Values may decrease during winter months and increase   during summer months. Values 20-29 ug/L may indicate Vitamin D insufficiency   and values <20 ug/L may indicate Vitamin D deficiency.  Vitamin D determination is routinely performed by an immunoassay specific for   25 hydroxyvitamin D3.  If an individual is on vitamin D2 (ergocalciferol)   supplementation, please specify 25 OH vitamin D2 and D3 level determination by   LCMSMS test [id number].     ESR: Erythrocyte sedimentation rate   Result Value Ref Range    Sed Rate 24 0 - 30 mm/h       If you have any questions or concerns, please call the clinic at the number listed above.       Sincerely,        Kevin Slaughter MD

## 2019-07-12 LAB — DEPRECATED CALCIDIOL+CALCIFEROL SERPL-MC: 31 UG/L (ref 20–75)

## 2019-07-22 RX ORDER — METOPROLOL SUCCINATE 25 MG/1
TABLET, EXTENDED RELEASE ORAL
Qty: 45 TABLET | Refills: 3 | Status: SHIPPED | OUTPATIENT
Start: 2019-07-22 | End: 2019-11-07

## 2019-07-25 ENCOUNTER — HOSPITAL ENCOUNTER (OUTPATIENT)
Dept: BONE DENSITY | Facility: CLINIC | Age: 79
Discharge: HOME | End: 2019-07-25
Attending: FAMILY MEDICINE | Admitting: FAMILY MEDICINE
Payer: COMMERCIAL

## 2019-07-25 DIAGNOSIS — M81.0 AGE-RELATED OSTEOPOROSIS WITHOUT CURRENT PATHOLOGICAL FRACTURE: ICD-10-CM

## 2019-07-25 PROCEDURE — 77080 DXA BONE DENSITY AXIAL: CPT

## 2019-08-23 ENCOUNTER — OFFICE VISIT (OUTPATIENT)
Dept: FAMILY MEDICINE | Facility: CLINIC | Age: 79
End: 2019-08-23
Payer: COMMERCIAL

## 2019-08-23 ENCOUNTER — ANCILLARY PROCEDURE (OUTPATIENT)
Dept: GENERAL RADIOLOGY | Facility: CLINIC | Age: 79
End: 2019-08-23
Attending: FAMILY MEDICINE
Payer: COMMERCIAL

## 2019-08-23 VITALS
SYSTOLIC BLOOD PRESSURE: 124 MMHG | HEART RATE: 53 BPM | WEIGHT: 155 LBS | DIASTOLIC BLOOD PRESSURE: 64 MMHG | RESPIRATION RATE: 16 BRPM | BODY MASS INDEX: 27.12 KG/M2 | TEMPERATURE: 98.8 F | OXYGEN SATURATION: 100 %

## 2019-08-23 DIAGNOSIS — M25.562 CHRONIC PAIN OF BOTH KNEES: ICD-10-CM

## 2019-08-23 DIAGNOSIS — K59.04 CHRONIC IDIOPATHIC CONSTIPATION: ICD-10-CM

## 2019-08-23 DIAGNOSIS — G89.29 CHRONIC PAIN OF BOTH KNEES: ICD-10-CM

## 2019-08-23 DIAGNOSIS — E78.5 HYPERLIPIDEMIA LDL GOAL <160: Chronic | ICD-10-CM

## 2019-08-23 DIAGNOSIS — M25.561 CHRONIC PAIN OF BOTH KNEES: ICD-10-CM

## 2019-08-23 DIAGNOSIS — E55.9 VITAMIN D INSUFFICIENCY: ICD-10-CM

## 2019-08-23 DIAGNOSIS — M81.0 AGE-RELATED OSTEOPOROSIS WITHOUT CURRENT PATHOLOGICAL FRACTURE: Primary | ICD-10-CM

## 2019-08-23 PROCEDURE — 99214 OFFICE O/P EST MOD 30 MIN: CPT | Performed by: FAMILY MEDICINE

## 2019-08-23 PROCEDURE — 73560 X-RAY EXAM OF KNEE 1 OR 2: CPT | Mod: LT

## 2019-08-23 RX ORDER — RIBOFLAVIN (VITAMIN B2) 100 MG
3 TABLET ORAL DAILY
Qty: 90 TABLET | Refills: 11 | Status: SHIPPED | OUTPATIENT
Start: 2019-08-23 | End: 2019-11-07

## 2019-08-23 RX ORDER — ACETAMINOPHEN 500 MG
500-1000 TABLET ORAL EVERY 6 HOURS PRN
Qty: 90 TABLET | Refills: 11 | Status: SHIPPED | OUTPATIENT
Start: 2019-08-23 | End: 2019-11-07

## 2019-08-23 RX ORDER — ALENDRONATE SODIUM 35 MG/1
35 TABLET ORAL
Qty: 5 TABLET | Refills: 11 | Status: SHIPPED | OUTPATIENT
Start: 2019-08-23 | End: 2019-08-23

## 2019-08-23 RX ORDER — CHOLECALCIFEROL (VITAMIN D3) 50 MCG
2 TABLET ORAL
Qty: 60 TABLET | Refills: 11 | Status: SHIPPED | OUTPATIENT
Start: 2019-08-23 | End: 2019-11-07

## 2019-08-23 RX ORDER — ATORVASTATIN CALCIUM 10 MG/1
10 TABLET, FILM COATED ORAL DAILY
Qty: 30 TABLET | Refills: 11 | Status: SHIPPED | OUTPATIENT
Start: 2019-08-23 | End: 2019-11-07

## 2019-08-23 RX ORDER — TROLAMINE SALICYLATE 10 G/100G
CREAM TOPICAL PRN
Qty: 226 G | Refills: 11 | Status: SHIPPED | OUTPATIENT
Start: 2019-08-23 | End: 2019-08-26

## 2019-08-23 RX ORDER — ALENDRONATE SODIUM 35 MG/1
35 TABLET ORAL
Qty: 5 TABLET | Refills: 11 | Status: SHIPPED | OUTPATIENT
Start: 2019-08-23 | End: 2019-08-26

## 2019-08-23 NOTE — PROGRESS NOTES
"Subjective     Rody Gonzalez is a 79 year old female who presents to clinic today for the following health issues:  Due to language barrier, an  was present during the history-taking and subsequent discussion (and for part of the physical exam) with this patient.  HPI   Follow up on labs from 7/11 and  dexa scan on 7/25  ABNORMAL DEXA   OSTEOPOROSIS NOTED LUMBAR 3-4 VERTEBRA  HIPS OK   GASTROESOPHAGEAL REFLUX DISEASE WITHOUT ESOPHAGITIS CONTROLLED   LUMBAR DISC PLUS OSTEOPOROSIS BACK   HISTORY OF TUBERCULOSIS IN LYMPH NODES   HISTORY OF ATYPICAL SQUAMOUS CELLS OF UNDETERMINED SIGNIFICANCE   DRY EYE SYNDROME   HISTORY OF WRIST FRACTURE   BILATERAL HEARING LOSS   HISTORY OF RECURRENT BLADDER INFECTIONS   LDL OR \"BAD\" CHOLESTEROL  GOAL < 100   HYPERTENSION WITH GOAL OF LESS THAN 140/80   FALL RISK HIGH    PROBLEM 2   PATELLOFEMORAL PAIN BILATERAL KNEES   WITH NOISE AND PAIN GOING UP AND DOWN STAIRS   BENDING AND KNEELING   OBJECTIVE MILD OSTEOARTHRITIS CHANGES   PLAN TOPICAL LIDOCAINE  AND OR ASPERCREME OR VOLTAREN GEL   GLUCOSAMINE CHONDROITIN DAILY           There are no preventive care reminders to display for this patient.          .  Current Outpatient Medications   Medication Sig Dispense Refill     acetaminophen (TYLENOL) 325 MG tablet Take 1-2 tablets (325-650 mg) by mouth every 6 hours as needed for mild pain 100 tablet 11     acetaminophen (TYLENOL) 500 MG tablet Take 1-2 tablets (500-1,000 mg) by mouth every 6 hours as needed for mild pain 90 tablet 11     alendronate (FOSAMAX) 35 MG tablet Take 1 tablet (35 mg) by mouth every 7 days 5 tablet 11     aspirin-acetaminophen-caffeine (EXCEDRIN MIGRAINE) 250-250-65 MG tablet Take 1 tablet by mouth every 6 hours as needed for headaches 80 tablet 1     atorvastatin (LIPITOR) 10 MG tablet Take 1 tablet (10 mg) by mouth daily 30 tablet 11     Cholecalciferol (VITAMIN D3) 2000 units TABS Take 2 tablets by mouth daily (with breakfast) 60 tablet 11     fish " oil-omega-3 fatty acids 1000 MG capsule TK 1 C PO D  11     glucosamine-chondroitinoitin 500-400 MG tablet Take 3 tablets by mouth daily 90 tablet 11     lidocaine (XYLOCAINE) 5 % external ointment One application 4 x daily to affected areas lower back and knees if necessary Profile Rx: patient will contact pharmacy when needed 150 g 3     linaclotide (LINZESS) 290 MCG capsule Take 1 capsule (290 mcg) by mouth every morning (before breakfast) 30 capsule 11     metoprolol succinate ER (TOPROL-XL) 25 MG 24 hr tablet TAKE 0.5 TABLETS (12.5 MG) BY MOUTH AT BEDTIME 45 tablet 3     Multiple Vitamin (MULTI-VITAMINS) TABS TK 1 T PO ONCE D  11     pantoprazole (PROTONIX) 40 MG EC tablet Take 1 tablet (40 mg) by mouth daily 90 tablet 1     polyethylene glycol 0.4%- propylene glycol 0.3% (SYSTANE) 0.4-0.3 % SOLN ophthalmic solution Place 1 drop into both eyes every hour as needed for dry eyes 15 mL 11     trolamine salicylate (ASPERCREME) 10 % external cream Apply topically as needed for moderate pain 226 g 11              Allergies   Allergen Reactions     Isoniazid      Pyrazinamide            Immunization History   Administered Date(s) Administered     Pneumococcal 23 valent 2006     Tdap (Adacel,Boostrix) 2006               reports that she does not drink alcohol.          reports that she does not use drugs.        family history includes Family History Negative in her father and mother.        indicated that the status of her no family hx of is unknown. She indicated that her mother is . She indicated that her father is .             has a past surgical history that includes Hysterectomy ().         reports that she does not currently engage in sexual activity but has had partners who are Male.    .  Pediatric History   Patient Guardian Status     Not on file     Other Topics Concern     Parent/sibling w/ CABG, MI or angioplasty before 65F 55M? No   Social History Narrative     Not on file                reports that she has never smoked. She has never used smokeless tobacco.        Medical, social, surgical, and family histories reviewed.        Labs reviewed in EPIC  Patient Active Problem List   Diagnosis     Health Care Home     Gastroesophageal reflux disease without esophagitis     DDD (degenerative disc disease), lumbar     Dysuria     Anxiety state     Urinary frequency     Tuberculosis of peripheral lymph nodes     Nonspecific abnormal results of thyroid function study     Pulmonary tuberculosis     PPD positive     Papanicolaou smear of cervix with atypical squamous cells of undetermined significance (ASC-US)     Osteoporosis     Tear film insufficiency     Memory loss     Headache     Abdominal pain     Closed fracture of triquetral (cuneiform) bone of wrist     Other chronic pain     Abnormality of gait     Hearing loss     Malaise and fatigue     Acute cystitis     Major depression in complete remission (H)     Sensory hearing loss, unilateral     Vitamin D deficiency disease     Hyperlipidemia LDL goal <160     Overweight (BMI 25.0-29.9)     Risk for falls     ACP (advance care planning)     Primary osteoarthritis of both knees     Tension headache     History of bloody stools     Essential hypertension, benign       Past Surgical History:   Procedure Laterality Date     HYSTERECTOMY  2004         Social History     Tobacco Use     Smoking status: Never Smoker     Smokeless tobacco: Never Used   Substance Use Topics     Alcohol use: No       Family History   Problem Relation Age of Onset     Family History Negative Mother      Family History Negative Father      Diabetes No family hx of      Coronary Artery Disease No family hx of      Hypertension No family hx of      Hyperlipidemia No family hx of      Cerebrovascular Disease No family hx of      Breast Cancer No family hx of      Colon Cancer No family hx of      Prostate Cancer No family hx of      Other Cancer No family hx of       Depression No family hx of      Anxiety Disorder No family hx of      Mental Illness No family hx of      Substance Abuse No family hx of      Anesthesia Reaction No family hx of      Asthma No family hx of      Osteoporosis No family hx of      Genetic Disorder No family hx of      Thyroid Disease No family hx of      Obesity No family hx of      Unknown/Adopted No family hx of              Current Outpatient Medications   Medication Sig Dispense Refill     acetaminophen (TYLENOL) 325 MG tablet Take 1-2 tablets (325-650 mg) by mouth every 6 hours as needed for mild pain 100 tablet 11     acetaminophen (TYLENOL) 500 MG tablet Take 1-2 tablets (500-1,000 mg) by mouth every 6 hours as needed for mild pain 90 tablet 11     alendronate (FOSAMAX) 35 MG tablet Take 1 tablet (35 mg) by mouth every 7 days 5 tablet 11     aspirin-acetaminophen-caffeine (EXCEDRIN MIGRAINE) 250-250-65 MG tablet Take 1 tablet by mouth every 6 hours as needed for headaches 80 tablet 1     atorvastatin (LIPITOR) 10 MG tablet Take 1 tablet (10 mg) by mouth daily 30 tablet 11     Cholecalciferol (VITAMIN D3) 2000 units TABS Take 2 tablets by mouth daily (with breakfast) 60 tablet 11     fish oil-omega-3 fatty acids 1000 MG capsule TK 1 C PO D  11     glucosamine-chondroitinoitin 500-400 MG tablet Take 3 tablets by mouth daily 90 tablet 11     lidocaine (XYLOCAINE) 5 % external ointment One application 4 x daily to affected areas lower back and knees if necessary Profile Rx: patient will contact pharmacy when needed 150 g 3     linaclotide (LINZESS) 290 MCG capsule Take 1 capsule (290 mcg) by mouth every morning (before breakfast) 30 capsule 11     metoprolol succinate ER (TOPROL-XL) 25 MG 24 hr tablet TAKE 0.5 TABLETS (12.5 MG) BY MOUTH AT BEDTIME 45 tablet 3     Multiple Vitamin (MULTI-VITAMINS) TABS TK 1 T PO ONCE D  11     pantoprazole (PROTONIX) 40 MG EC tablet Take 1 tablet (40 mg) by mouth daily 90 tablet 1     polyethylene glycol 0.4%-  propylene glycol 0.3% (SYSTANE) 0.4-0.3 % SOLN ophthalmic solution Place 1 drop into both eyes every hour as needed for dry eyes 15 mL 11     trolamine salicylate (ASPERCREME) 10 % external cream Apply topically as needed for moderate pain 226 g 11           Recent Labs   Lab Test 05/11/18  1634 07/18/17  1605  04/13/16  0943 12/04/15  1530 04/10/15  1257 03/04/14  1050 09/13/13  1125   LDL 98  --   --  126* 125* 116 134* 120   HDL  --   --   --  41* 53 42* 42* 41*   TRIG  --   --   --  131 100 77 84 99   ALT 33 25  --   --  30 44 22  --    CR 0.71 0.67   < > 0.70  --  0.80 0.70 0.70   GFRESTIMATED 79 85   < > 81  --  70 82 82   GFRESTBLACK >90 >90  African American GFR Calc     < > >90  --  85 >90 >90   POTASSIUM 5.2 4.0   < > 4.0  --  4.7 4.5 4.4   TSH  --   --   --   --   --   --  1.47 1.86    < > = values in this interval not displayed.            BP Readings from Last 6 Encounters:   08/23/19 124/64   07/11/19 104/70   06/25/19 172/81   05/01/19 140/82   04/18/19 126/68   02/13/19 136/70           Wt Readings from Last 3 Encounters:   08/23/19 70.3 kg (155 lb)   07/11/19 71.2 kg (157 lb)   06/25/19 71.2 kg (157 lb)                 Positive symptoms or findings indicated by bold designation:         ROS: 10 point ROS neg other than the symptoms noted above in the HPI.except  has Health Care Home; Gastroesophageal reflux disease without esophagitis; DDD (degenerative disc disease), lumbar; Dysuria; Anxiety state; Urinary frequency; Tuberculosis of peripheral lymph nodes; Nonspecific abnormal results of thyroid function study; Pulmonary tuberculosis; PPD positive; Papanicolaou smear of cervix with atypical squamous cells of undetermined significance (ASC-US); Osteoporosis; Tear film insufficiency; Memory loss; Headache; Abdominal pain; Closed fracture of triquetral (cuneiform) bone of wrist; Other chronic pain; Abnormality of gait; Hearing loss; Malaise and fatigue; Acute cystitis; Major depression in complete  remission (H); Sensory hearing loss, unilateral; Vitamin D deficiency disease; Hyperlipidemia LDL goal <160; Overweight (BMI 25.0-29.9); Risk for falls; ACP (advance care planning); Primary osteoarthritis of both knees; Tension headache; History of bloody stools; and Essential hypertension, benign on their problem list.  Review Of Systems    Skin: negative    Eyes: negative    Ears/Nose/Throat: negative    Respiratory: No shortness of breath, dyspnea on exertion, cough, or hemoptysis    Cardiovascular: negative    Gastrointestinal: heartburn    Genitourinary: HISTORY OF DYSURIA AND URINARY TRACT INFECTION     Musculoskeletal: back pain, arthritis and joint pain    Neurologic: negative    Psychiatric: negative    Hematologic/Lymphatic/Immunologic: negative    Endocrine: negative                PE:  /64   Pulse 53   Temp 98.8  F (37.1  C) (Tympanic)   Resp 16   Wt 70.3 kg (155 lb)   LMP  (LMP Unknown)   SpO2 100%   BMI 27.12 kg/m   Body mass index is 27.12 kg/m .        Constitutional: general appearance, well nourished, well developed, in no acute distress, well developed, appears stated age, normal body habitus,          Eyes:; The patient has normal eyelids sclerae and conjunctivae :          Ears/Nose/Throat: external ear, overall: normal appearance; external nose, overall: benign appearance, normal moujth gums and lips           Neck: thyroid, overall: normal size, normal consistency, nontender,          Respiratory:  palpation of chest, overall: normal excursion,    Clear to percussion and auscultation     NO Tachypnea    NORMAL  Color           Cardiovascular:  Good color with no peripheral edema    Regular sinus rhythm without murmur.  Physiologic heart sounds   Heart is unelarged    .     Chest/Breast: normal shape           Abdominal exam,  Liver and spleen are  unenlarged         Tenderness    Scars              Urogenital; no renal, flank or bladder  tenderness;          Lymphatic: neck nodes,      Other nodes         Musculoskeletal:  Brief ortho exam normal except:   DECREASE RANGE OF MOTION OF BACK     UNSTEADY GAIT     OSTEOARTHRITIS KNEE PAIN     XRAY WITHIN NORMAL LIMITS BILATERAL KNEES         Integument: inspection of skin, no rash, lesions; and, palpation, no induration, no tenderness.          Neurologic mental status, overall: alert and oriented; gait, no ataxia, no unsteadiness; coordination, no tremors; cranial nerves, overall: normal motor, overall: normal bulk, tone.          Psychiatric: orientation/consciousness, overall: oriented to person, place and time; behavior/psychomotor activity, no tics, normal psychomotor activity; mood and affect, overall: normal mood and affect; appearance, overall: well-groomed, good eye contact; speech, overall: normal quality, no aphasia and normal quality, quantity, intact.        Diagnostic Test Results:  Results for orders placed or performed during the hospital encounter of 07/25/19   DX Hip/Pelvis/Spine    Narrative    BONE DENSITY (DEXA)  7/25/2019 11:55 AM    HISTORY: Postmenopausal.    COMPARISON: 10/12/2009    TECHNIQUE: The study was performed using DEXA in the AP lumbar spine  and AP femur.  In accordance with the ISCD (International Society of  Clinical Densitometry), the lowest BMD between the total hip and  femoral neck will be used.     FINDINGS: Using DEXA, the results were reported according to T-score.  The T-score is the standard deviation from the peak bone mass in a  normal young patient. A T-score of 0 to -1.0 is normal. A T-score of  -1.0 to -2.5 correlates with osteopenia. A T-score of less than -2.5  correlates with osteoporosis.      The L3-L4 T-score is -4.1 (this was not reported previously), and  there may well be false elevation at the other two levels. The femoral  neck T-scores are -3.4 on the left and -3.3 on the right. Previously  this was -2.2 on the left. The FRAX 10-year probability is 14% for  major osteoporotic fracture  and 6% for hip fracture.  All treatment  decisions require clinical judgment and consideration of individual  patient factors which may not be captured in the FRAX model and the  risk of fracture may be over- or under-estimated by FRAX.       Impression    IMPRESSION:  1. Marked osteoporosis at L3-L4.  2. Moderate osteoporosis within both femoral necks.     ROMI JAIME MD           ICD-10-CM    1. Age-related osteoporosis without current pathological fracture M81.0 alendronate (FOSAMAX) 35 MG tablet     DISCONTINUED: alendronate (FOSAMAX) 35 MG tablet   2. Hyperlipidemia LDL goal <160 E78.5 atorvastatin (LIPITOR) 10 MG tablet   3. Chronic idiopathic constipation K59.04 linaclotide (LINZESS) 290 MCG capsule   4. Chronic pain of both knees M25.561 trolamine salicylate (ASPERCREME) 10 % external cream    M25.562 XR Knee Bilateral 1/2 Views    G89.29 glucosamine-chondroitinoitin 500-400 MG tablet     acetaminophen (TYLENOL) 500 MG tablet   5. Vitamin D insufficiency E55.9 Cholecalciferol (VITAMIN D3) 2000 units TABS              .    Side effects benefits and risks thoroughly discussed. .she may come in early if unimproved or getting worse          Please drink 2 glasses of water prior to meals and walk 15-30 minutes after meals        I spent 25 MINUTES SPENT  with patient discussing the following issues   The primary encounter diagnosis was Age-related osteoporosis without current pathological fracture. Diagnoses of Hyperlipidemia LDL goal <160, Chronic idiopathic constipation, Chronic pain of both knees, and Vitamin D insufficiency were also pertinent to this visit. over half of which involved counseling and coordination of care.      Patient Instructions   (M81.0) Age-related osteoporosis without current pathological fracture  (primary encounter diagnosis)  Comment:    Plan: alendronate (FOSAMAX) 35 MG tablet,         DISCONTINUED: alendronate (FOSAMAX) 35 MG         tablet             (E78.5) Hyperlipidemia LDL  goal <160  Comment:    Plan: atorvastatin (LIPITOR) 10 MG tablet             (K59.04) Chronic idiopathic constipation  Comment:    Plan: linaclotide (LINZESS) 290 MCG capsule             (M25.561,  M25.562,  G89.29) Chronic pain of both knees  Comment:    Plan: trolamine salicylate (ASPERCREME) 10 % external        cream, XR Knee Bilateral 1/2 Views,         glucosamine-chondroitinoitin 500-400 MG tablet,        acetaminophen (TYLENOL) 500 MG tablet             (E55.9) Vitamin D insufficiency  Comment:    Plan: Cholecalciferol (VITAMIN D3) 2000 units TABS    Normal knee XRAY     Dallin' Flexion Versus Elizabeth   Extension Exercises For   Low Back Pain   Examples of Dallin' Flexion Exercises  1. Pelvic tilt.  Please press the small of your back against the floor.  Start with 5-10  and increase to 100 count over one month   2. Single Knee to chest. Lie on your back with legs in bent position. Alternate one leg and the other very slowly bringing the knee to chest.  Start with a count of 5-10   Over one month work up to 100  3. Double knee to chest.  Lie on your back with knees in bent position.  Bring both knees to the chest slowly hold for a count of 5-to 10. Over one month work to 100  4. Partial sit-up or crunch.  Lie on your back in bent leg position.  Please bring your body with arms crossed in front  To 30 degrees of flexion. Start with 5-10 over one month work up to 100 or more  5. Sit back.  Please sit on the side of the bed or a stair landing  And lie backwards until the abdominal muscles start to quiver.  Hold for a count of 5-10 and over a month work up to 100.  5. Hamstring stretch.  Please extend your legs while sitting on the floor as tolerated for 5-10 count.  Gradually increase to count of 30 over one month      Alternately find a stair landing or sturdy chair and place heel  In a comfortable level of extension  And stretch one hamstring at a time for 5-10 seconds.  Increase to count of 30 over one  month                         Squat.  Stand with legs comfortably apart and lower the body slowly by flexing the knees  for count of 5-10 over one month increase to 30.  Useful for anterior disc protrusion, facette joint arthritis spond-10ylolysis, spondylolisthesis and spinal stenosis   ROTATION AND LATERAL BENDING AS TOLERATED RIGHT OR LEFT     PILLOWS UNDER KNEES AT BED TIME    STRETCHING FORWARDS AND DOWN WHEN SITTING ON CHAIR    MAY SIT IN RELAXED MANNER     WHEN IN PREVENTION PHASE START WITH WITH MCKENNA EXERCISES AND FOLLOW UP  WITH NELI EXERCISES AS TOLERATED     GAYLA VEGA JR., MD       KNEE EXERCISES        ICE TO KNEE 5 MINUTES PRIOR TO EXERCISE IF POSSIBLE    ISOMETRIC KNEE EXTENDED POSITION STANDING X 30 TWICE DAILY \    FLEXION OF KNEE ISOMETRIC 90 DEGREE FLEXION X 30 TWICE DAILY    WITH FIVE POUND WEIGHTS OR PURSE    SITTING EXTENDED KNEE  X 3O SECONDS TWICE DAILY    STANDING WITH KNEE FLEXED X 30 SECONDS TWICE DAILY    CLOSED CHAIN EXERCISE  ,THAT IS ONE 1-2 INCH BOOK 30 REPS ON AND OFF THE BOOK OR PLATFORM     AFTER 2 WEEK INCREASE TO 3 INCHES    AFTER 4 WEEKS INCREASE TO 4 INCHES    WALL SITTING 30 SECONDS 45 DEGREES    AFTER 2 WEEKS 30 SECONDS 60 DEGREES    AFTER  4 WEEKS 30 SECONDS 90 DEGREES    BALANCE 30 SECONDS WITH OPPOSITE LEG AT 30 DEGREES AND ARM TO SIDE X 2 WEEKS    BALANCE 30 SECONDS WITH OPPOSITE LEG AT 60 DEGREES AND ARM TO SIDE X 2 WEEKS    REPEAT with OPPOSITE LEGS BALANCING       A                                  ALL THE ABOVE PROBLEMS ARE STABLE AND MED CHANGES AS NOTED        Diet:  MEDITERRANEAN DIET         Exercise: LOWER BACK PAIN   Exercises Range of motion, balance, isometric, and strengthening exercises 30 repetitions twice daily of involved joints            .GAYLA VEGA MD 8/23/2019 2:11 PM  August 23, 2019

## 2019-08-23 NOTE — PATIENT INSTRUCTIONS
(M81.0) Age-related osteoporosis without current pathological fracture  (primary encounter diagnosis)  Comment:    Plan: alendronate (FOSAMAX) 35 MG tablet,         DISCONTINUED: alendronate (FOSAMAX) 35 MG         tablet             (E78.5) Hyperlipidemia LDL goal <160  Comment:    Plan: atorvastatin (LIPITOR) 10 MG tablet             (K59.04) Chronic idiopathic constipation  Comment:    Plan: linaclotide (LINZESS) 290 MCG capsule             (M25.561,  M25.562,  G89.29) Chronic pain of both knees  Comment:    Plan: trolamine salicylate (ASPERCREME) 10 % external        cream, XR Knee Bilateral 1/2 Views,         glucosamine-chondroitinoitin 500-400 MG tablet,        acetaminophen (TYLENOL) 500 MG tablet             (E55.9) Vitamin D insufficiency  Comment:    Plan: Cholecalciferol (VITAMIN D3) 2000 units TABS    Normal knee XRAY     Dallin' Flexion Versus Elizabeth   Extension Exercises For   Low Back Pain   Examples of Dallin' Flexion Exercises  1. Pelvic tilt.  Please press the small of your back against the floor.  Start with 5-10  and increase to 100 count over one month   2. Single Knee to chest. Lie on your back with legs in bent position. Alternate one leg and the other very slowly bringing the knee to chest.  Start with a count of 5-10   Over one month work up to 100  3. Double knee to chest.  Lie on your back with knees in bent position.  Bring both knees to the chest slowly hold for a count of 5-to 10. Over one month work to 100  4. Partial sit-up or crunch.  Lie on your back in bent leg position.  Please bring your body with arms crossed in front  To 30 degrees of flexion. Start with 5-10 over one month work up to 100 or more  5. Sit back.  Please sit on the side of the bed or a stair landing  And lie backwards until the abdominal muscles start to quiver.  Hold for a count of 5-10 and over a month work up to 100.  5. Hamstring stretch.  Please extend your legs while sitting on the floor as tolerated  for 5-10 count.  Gradually increase to count of 30 over one month      Alternately find a stair landing or sturdy chair and place heel  In a comfortable level of extension  And stretch one hamstring at a time for 5-10 seconds.  Increase to count of 30 over one month                         Squat.  Stand with legs comfortably apart and lower the body slowly by flexing the knees  for count of 5-10 over one month increase to 30.  Useful for anterior disc protrusion, facette joint arthritis spond-10ylolysis, spondylolisthesis and spinal stenosis   ROTATION AND LATERAL BENDING AS TOLERATED RIGHT OR LEFT     PILLOWS UNDER KNEES AT BED TIME    STRETCHING FORWARDS AND DOWN WHEN SITTING ON CHAIR    MAY SIT IN RELAXED MANNER     WHEN IN PREVENTION PHASE START WITH WITH MCKENNA EXERCISES AND FOLLOW UP  WITH NELI EXERCISES AS TOLERATED     GAYLA VEGA JR., MD       KNEE EXERCISES        ICE TO KNEE 5 MINUTES PRIOR TO EXERCISE IF POSSIBLE    ISOMETRIC KNEE EXTENDED POSITION STANDING X 30 TWICE DAILY \    FLEXION OF KNEE ISOMETRIC 90 DEGREE FLEXION X 30 TWICE DAILY    WITH FIVE POUND WEIGHTS OR PURSE    SITTING EXTENDED KNEE  X 3O SECONDS TWICE DAILY    STANDING WITH KNEE FLEXED X 30 SECONDS TWICE DAILY    CLOSED CHAIN EXERCISE  ,THAT IS ONE 1-2 INCH BOOK 30 REPS ON AND OFF THE BOOK OR PLATFORM     AFTER 2 WEEK INCREASE TO 3 INCHES    AFTER 4 WEEKS INCREASE TO 4 INCHES    WALL SITTING 30 SECONDS 45 DEGREES    AFTER 2 WEEKS 30 SECONDS 60 DEGREES    AFTER  4 WEEKS 30 SECONDS 90 DEGREES    BALANCE 30 SECONDS WITH OPPOSITE LEG AT 30 DEGREES AND ARM TO SIDE X 2 WEEKS    BALANCE 30 SECONDS WITH OPPOSITE LEG AT 60 DEGREES AND ARM TO SIDE X 2 WEEKS    REPEAT with OPPOSITE LEGS BALANCING       A

## 2019-08-26 ENCOUNTER — TELEPHONE (OUTPATIENT)
Dept: FAMILY MEDICINE | Facility: CLINIC | Age: 79
End: 2019-08-26

## 2019-08-26 DIAGNOSIS — M81.0 AGE-RELATED OSTEOPOROSIS WITHOUT CURRENT PATHOLOGICAL FRACTURE: ICD-10-CM

## 2019-08-26 DIAGNOSIS — E78.5 HYPERLIPIDEMIA LDL GOAL <160: Chronic | ICD-10-CM

## 2019-08-26 DIAGNOSIS — M25.562 CHRONIC PAIN OF BOTH KNEES: ICD-10-CM

## 2019-08-26 DIAGNOSIS — M25.561 CHRONIC PAIN OF BOTH KNEES: ICD-10-CM

## 2019-08-26 DIAGNOSIS — G89.29 CHRONIC PAIN OF BOTH KNEES: ICD-10-CM

## 2019-08-26 RX ORDER — TROLAMINE SALICYLATE 10 G/100G
CREAM TOPICAL PRN
Qty: 226 G | Refills: 11 | Status: SHIPPED | OUTPATIENT
Start: 2019-08-26 | End: 2019-11-07

## 2019-08-26 RX ORDER — ALENDRONATE SODIUM 35 MG/1
35 TABLET ORAL
Qty: 5 TABLET | Refills: 11 | Status: SHIPPED | OUTPATIENT
Start: 2019-08-26 | End: 2019-11-07

## 2019-08-26 RX ORDER — ALENDRONATE SODIUM 35 MG/1
35 TABLET ORAL
Qty: 5 TABLET | Refills: 11 | Status: SHIPPED | OUTPATIENT
Start: 2019-08-26 | End: 2019-08-26

## 2019-08-26 RX ORDER — ALENDRONATE SODIUM 35 MG/1
35 TABLET ORAL
Qty: 5 TABLET | Refills: 11 | Status: CANCELLED | OUTPATIENT
Start: 2019-08-26

## 2019-08-26 NOTE — TELEPHONE ENCOUNTER
"Requested Prescriptions   Pending Prescriptions Disp Refills     atorvastatin (LIPITOR) 10 MG tablet  Last Written Prescription Date:  8/23/19  Last Fill Quantity: 30,  # refills: 11   Last office visit: 8/23/2019 with prescribing provider:  ondina   Future Office Visit:     30 tablet 11     Sig: Take 1 tablet (10 mg) by mouth daily       Statins Protocol Failed - 8/26/2019  1:44 PM        Failed - LDL on file in past 12 months     Recent Labs   Lab Test 05/11/18  1634   LDL 98             Passed - No abnormal creatine kinase in past 12 months     No lab results found.             Passed - Recent (12 mo) or future (30 days) visit within the authorizing provider's specialty     Patient had office visit in the last 12 months or has a visit in the next 30 days with authorizing provider or within the authorizing provider's specialty.  See \"Patient Info\" tab in inbasket, or \"Choose Columns\" in Meds & Orders section of the refill encounter.              Passed - Medication is active on med list        Passed - Patient is age 18 or older        Passed - No active pregnancy on record        Passed - No positive pregnancy test in past 12 months        alendronate (FOSAMAX) 35 MG tablet  Last Written Prescription Date:  8/26/19  Last Fill Quantity: 5,  # refills: 11   Last office visit: 8/23/2019 with prescribing provider:  ondina   Future Office Visit:     5 tablet 11     Sig: Take 1 tablet (35 mg) by mouth every 7 days       Bisphosphonates Failed - 8/26/2019  1:44 PM        Failed - Normal serum creatinine on file within past 12 months     Recent Labs   Lab Test 05/11/18  1634   CR 0.71             Passed - Recent (12 mo) or future (30 days) visit within the authorizing provider's specialty     Patient had office visit in the last 12 months or has a visit in the next 30 days with authorizing provider or within the authorizing provider's specialty.  See \"Patient Info\" tab in inbasket, or \"Choose Columns\" in Meds & " Orders section of the refill encounter.              Passed - Dexa on file within past 2 years     Please review last Dexa result.           Passed - Medication is active on med list        Passed - Patient is age 18 or older

## 2019-08-26 NOTE — TELEPHONE ENCOUNTER
Reason for Call:  Medication or medication refill:aspercream and fosomax    Do you use a [facility]?  Name of the pharmacy and phone number for the current request:  Wants written as pharmacy is out of these for two months    Name of the medication requested: Aspercreme and fosomax    Other request: dtr waiting     Can we leave a detailed message on this number? YES    Phone number patient can be reached at: Home number on file [phone] (home)    Best Time:    Call taken on 8/26/2019 at 1:17 PM by ZOIE CLAY

## 2019-08-27 RX ORDER — ATORVASTATIN CALCIUM 10 MG/1
10 TABLET, FILM COATED ORAL DAILY
Qty: 30 TABLET | Refills: 11 | OUTPATIENT
Start: 2019-08-27

## 2019-08-30 ENCOUNTER — PATIENT OUTREACH (OUTPATIENT)
Dept: GERIATRIC MEDICINE | Facility: CLINIC | Age: 79
End: 2019-08-30

## 2019-08-30 NOTE — PROGRESS NOTES
[facility] Care Coordination Contact      [facility] Six-Month Telephone Assessment    6 month telephone assessment completed on 08/30/2019.    ER visits: No  Hospitalizations: No  TCU stays: No  Significant health status changes: n/a  Falls/Injuries: No  ADL/IADL changes: No  Changes in services: No    Caregiver Assessment follow up:  n/a    Goals: See POC in chart for goal progress documentation.      Will see member in 6 months for an annual health risk assessment.   Encouraged member to call CC with any questions or concerns in the meantime.   Johnna Malin RN BSN PHN  [facility]   RN Care Coordinator   Phone:   [phone]  Fax:       [phone]

## 2019-09-04 ENCOUNTER — TELEPHONE (OUTPATIENT)
Dept: AUDIOLOGY | Facility: CLINIC | Age: 79
End: 2019-09-04

## 2019-09-04 NOTE — TELEPHONE ENCOUNTER
Called patient's family member. Let them know the prior auth for the hearing aids never went through and was lost. I resubmitted today. Should hear in a few weeks.I will call when I am given notice.      Satya Kelley, [facility]  Licensed Audiologist  [place] #[id number]

## 2019-11-07 ENCOUNTER — OFFICE VISIT (OUTPATIENT)
Dept: FAMILY MEDICINE | Facility: CLINIC | Age: 79
End: 2019-11-07
Payer: COMMERCIAL

## 2019-11-07 VITALS
BODY MASS INDEX: 26.77 KG/M2 | TEMPERATURE: 99 F | WEIGHT: 153 LBS | OXYGEN SATURATION: 98 % | DIASTOLIC BLOOD PRESSURE: 68 MMHG | RESPIRATION RATE: 16 BRPM | HEART RATE: 54 BPM | SYSTOLIC BLOOD PRESSURE: 118 MMHG

## 2019-11-07 DIAGNOSIS — M25.561 CHRONIC PAIN OF BOTH KNEES: ICD-10-CM

## 2019-11-07 DIAGNOSIS — M81.0 AGE RELATED OSTEOPOROSIS, UNSPECIFIED PATHOLOGICAL FRACTURE PRESENCE: ICD-10-CM

## 2019-11-07 DIAGNOSIS — M17.0 PRIMARY OSTEOARTHRITIS OF BOTH KNEES: Chronic | ICD-10-CM

## 2019-11-07 DIAGNOSIS — M81.0 AGE-RELATED OSTEOPOROSIS WITHOUT CURRENT PATHOLOGICAL FRACTURE: ICD-10-CM

## 2019-11-07 DIAGNOSIS — E55.9 VITAMIN D INSUFFICIENCY: ICD-10-CM

## 2019-11-07 DIAGNOSIS — G44.209 TENSION HEADACHE: Chronic | ICD-10-CM

## 2019-11-07 DIAGNOSIS — H04.123 INSUFFICIENCY OF TEAR FILM OF BOTH EYES: ICD-10-CM

## 2019-11-07 DIAGNOSIS — E78.5 HYPERLIPIDEMIA LDL GOAL <160: Chronic | ICD-10-CM

## 2019-11-07 DIAGNOSIS — E55.9 VITAMIN D DEFICIENCY DISEASE: Primary | ICD-10-CM

## 2019-11-07 DIAGNOSIS — H90.5 SENSORY HEARING LOSS, UNILATERAL: ICD-10-CM

## 2019-11-07 DIAGNOSIS — G89.29 CHRONIC PAIN OF BOTH KNEES: ICD-10-CM

## 2019-11-07 DIAGNOSIS — R30.0 DYSURIA: ICD-10-CM

## 2019-11-07 DIAGNOSIS — M51.369 DDD (DEGENERATIVE DISC DISEASE), LUMBAR: Chronic | ICD-10-CM

## 2019-11-07 DIAGNOSIS — R35.0 URINARY FREQUENCY: ICD-10-CM

## 2019-11-07 DIAGNOSIS — H04.123 DRY EYE SYNDROME OF BOTH EYES: ICD-10-CM

## 2019-11-07 DIAGNOSIS — G44.219 EPISODIC TENSION-TYPE HEADACHE, NOT INTRACTABLE: ICD-10-CM

## 2019-11-07 DIAGNOSIS — K21.9 GASTROESOPHAGEAL REFLUX DISEASE WITHOUT ESOPHAGITIS: Chronic | ICD-10-CM

## 2019-11-07 DIAGNOSIS — K59.04 CHRONIC IDIOPATHIC CONSTIPATION: ICD-10-CM

## 2019-11-07 DIAGNOSIS — I10 ESSENTIAL HYPERTENSION, BENIGN: ICD-10-CM

## 2019-11-07 DIAGNOSIS — M25.562 CHRONIC PAIN OF BOTH KNEES: ICD-10-CM

## 2019-11-07 DIAGNOSIS — R41.3 MEMORY LOSS: ICD-10-CM

## 2019-11-07 PROCEDURE — 99213 OFFICE O/P EST LOW 20 MIN: CPT | Performed by: FAMILY MEDICINE

## 2019-11-07 RX ORDER — ACETAMINOPHEN 500 MG
500-1000 TABLET ORAL EVERY 6 HOURS PRN
Qty: 90 TABLET | Refills: 11 | Status: SHIPPED | OUTPATIENT
Start: 2019-11-07 | End: 2020-02-20

## 2019-11-07 RX ORDER — ATORVASTATIN CALCIUM 10 MG/1
10 TABLET, FILM COATED ORAL DAILY
Qty: 30 TABLET | Refills: 11 | Status: SHIPPED | OUTPATIENT
Start: 2019-11-07 | End: 2020-02-20

## 2019-11-07 RX ORDER — CHOLECALCIFEROL (VITAMIN D3) 50 MCG
2 TABLET ORAL
Qty: 60 TABLET | Refills: 11 | Status: SHIPPED | OUTPATIENT
Start: 2019-11-07 | End: 2020-02-20

## 2019-11-07 RX ORDER — RIBOFLAVIN (VITAMIN B2) 100 MG
3 TABLET ORAL DAILY
Qty: 90 TABLET | Refills: 11 | Status: SHIPPED | OUTPATIENT
Start: 2019-11-07 | End: 2020-02-20

## 2019-11-07 RX ORDER — PANTOPRAZOLE SODIUM 40 MG/1
40 TABLET, DELAYED RELEASE ORAL DAILY
Qty: 90 TABLET | Refills: 1 | Status: SHIPPED | OUTPATIENT
Start: 2019-11-07 | End: 2020-02-20

## 2019-11-07 RX ORDER — TROLAMINE SALICYLATE 10 G/100G
CREAM TOPICAL PRN
Qty: 226 G | Refills: 11 | Status: SHIPPED | OUTPATIENT
Start: 2019-11-07 | End: 2020-02-20

## 2019-11-07 RX ORDER — ALENDRONATE SODIUM 35 MG/1
35 TABLET ORAL
Qty: 5 TABLET | Refills: 11 | Status: SHIPPED | OUTPATIENT
Start: 2019-11-07 | End: 2020-02-20

## 2019-11-07 RX ORDER — BLOOD PRESSURE TEST KIT-LARGE
1 KIT MISCELLANEOUS 2 TIMES DAILY
Qty: 1 EACH | Refills: 0 | Status: SHIPPED | OUTPATIENT
Start: 2019-11-07 | End: 2020-02-11

## 2019-11-07 RX ORDER — LIDOCAINE 50 MG/G
OINTMENT TOPICAL
Qty: 150 G | Refills: 3 | Status: SHIPPED | OUTPATIENT
Start: 2019-11-07 | End: 2020-02-20

## 2019-11-07 RX ORDER — CHLORAL HYDRATE 500 MG
1 CAPSULE ORAL DAILY
Qty: 90 CAPSULE | Refills: 11 | Status: SHIPPED | OUTPATIENT
Start: 2019-11-07 | End: 2020-11-05

## 2019-11-07 RX ORDER — METOPROLOL SUCCINATE 25 MG/1
TABLET, EXTENDED RELEASE ORAL
Qty: 45 TABLET | Refills: 3 | Status: SHIPPED | OUTPATIENT
Start: 2019-11-07 | End: 2020-02-06

## 2019-11-07 ASSESSMENT — PATIENT HEALTH QUESTIONNAIRE - PHQ9
SUM OF ALL RESPONSES TO PHQ QUESTIONS 1-9: 0
SUM OF ALL RESPONSES TO PHQ QUESTIONS 1-9: 0
10. IF YOU CHECKED OFF ANY PROBLEMS, HOW DIFFICULT HAVE THESE PROBLEMS MADE IT FOR YOU TO DO YOUR WORK, TAKE CARE OF THINGS AT HOME, OR GET ALONG WITH OTHER PEOPLE: NOT DIFFICULT AT ALL

## 2019-11-07 NOTE — PATIENT INSTRUCTIONS
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  Comment:    Plan:      (H90.5) Sensory hearing loss, unilateral  Comment:    Plan:      (M17.0) Primary osteoarthritis of both knees  Comment:    Plan: fish oil-omega-3 fatty acids 1000 MG capsule             (R41.3) Memory loss  Comment:    Plan:      (I10) Essential hypertension, benign  Comment:     Plan: metoprolol succinate ER (TOPROL-XL) 25 MG 24 hr        tablet             (M51.36) DDD (degenerative disc disease), lumbar  Comment:    Plan:      (R30.0) Dysuria  Comment:    Plan:      (M25.561,  M25.562,  G89.29) Chronic pain of both knees  Comment:    Plan: acetaminophen (TYLENOL) 500 MG tablet,         glucosamine-chondroitinoitin 500-400 MG tablet,        trolamine salicylate (ASPERCREME) 10 % external        cream              (M81.0) Age-related osteoporosis without current pathological fracture  Comment:    Plan: alendronate (FOSAMAX) 35 MG tablet             (G44.219) Episodic tension-type headache, not intractable  Comment:    Plan: aspirin-acetaminophen-caffeine (EXCEDRIN         MIGRAINE) 250-250-65 MG tablet, metoprolol         succinate ER (TOPROL-XL) 25 MG 24 hr tablet             (E78.5) Hyperlipidemia LDL goal <160  Comment:    Plan: atorvastatin (LIPITOR) 10 MG tablet             (E55.9) Vitamin D insufficiency  Comment:     Plan: Cholecalciferol (VITAMIN D3) 50 MCG (2000 UT)         TABS             (M81.0) Age related osteoporosis, unspecified pathological fracture presence  Comment:    Plan: lidocaine (XYLOCAINE) 5 % external ointment             (K59.04) Chronic idiopathic constipation  Comment:    Plan: linaclotide  (LINZESS) 290 MCG capsule              (H04.123) Dry eye syndrome of both eyes  Comment:       Plan: polyethylene glycol-propylene glycol (SYSTANE)         0.4-0.3 % SOLN ophthalmic solution

## 2019-11-07 NOTE — TELEPHONE ENCOUNTER
"Requested Prescriptions   Pending Prescriptions Disp Refills     alendronate (FOSAMAX) 35 MG tablet [Pharmacy Med Name: ALENDRONATE 35MG TABLETS]    Last Written Prescription Date:  11/07/2019  Last Fill Quantity: 5 tablet,  # refills: 11   Last office visit: 11/6/2019 with prescribing provider:  KASSY Yen   Future Office Visit:     12 tablet 11     Sig: TAKE 1 TABLET BY MOUTH EVERY 7 DAYS       Bisphosphonates Failed - 11/7/2019 11:30 AM        Failed - Normal serum creatinine on file within past 12 months     Recent Labs   Lab Test 05/11/18  1634   CR 0.71             Passed - Recent (12 mo) or future (30 days) visit within the authorizing provider's specialty     Patient has had an office visit with the authorizing provider or a provider within the authorizing providers department within the previous 12 mos or has a future within next 30 days. See \"Patient Info\" tab in inbasket, or \"Choose Columns\" in Meds & Orders section of the refill encounter.              Passed - Dexa on file within past 2 years     Please review last Dexa result.           Passed - Medication is active on med list        Passed - Patient is age 18 or older           "

## 2019-11-07 NOTE — PROGRESS NOTES
Subjective     Rody Gonzalez is a 79 year old female who presents to clinic today for the following health issues:  Due to language barrier, an  was present during the history-taking and subsequent discussion (and for part of the physical exam) with this patient.  HPI   Hypertension Follow-up      Do you check your blood pressure regularly outside of the clinic? Occasionally at pharmacy    Are you following a low salt diet? Yes    Are your blood pressures ever more than 140 on the top number (systolic) OR more   than 90 on the bottom number (diastolic), for example 140/90? No   Last check when having dizziness and a headache the reading was 130/84      How many servings of fruits and vegetables do you eat daily?  0-1    On average, how many sweetened beverages do you drink each day (soda, juice, sweet tea, etc)?   0    How many days per week do you miss taking your medication? 0    Medication Followup of yes, vitamin d    Taking Medication as prescribed: yes    Side Effects:  None    Medication Helping Symptoms:  yes           There are no preventive care reminders to display for this patient.          .  Current Outpatient Medications   Medication Sig Dispense Refill     acetaminophen (TYLENOL) 500 MG tablet Take 1-2 tablets (500-1,000 mg) by mouth every 6 hours as needed for mild pain 90 tablet 11     alendronate (FOSAMAX) 35 MG tablet Take 1 tablet (35 mg) by mouth every 7 days 5 tablet 11     aspirin-acetaminophen-caffeine (EXCEDRIN MIGRAINE) 250-250-65 MG tablet Take 1 tablet by mouth every 6 hours as needed for headaches 80 tablet 1     atorvastatin (LIPITOR) 10 MG tablet Take 1 tablet (10 mg) by mouth daily 30 tablet 11     Blood Pressure Monitoring (BLOOD PRESSURE MONITOR 3) DESIREE 1 each 2 times daily 1 each 0     Cholecalciferol (VITAMIN D3) 50 MCG (2000 UT) TABS Take 2 tablets by mouth daily (with breakfast) 60 tablet 11     fish oil-omega-3 fatty acids 1000 MG capsule Take 1 capsule (1 g) by  mouth daily 90 capsule 11     glucosamine-chondroitinoitin 500-400 MG tablet Take 3 tablets by mouth daily 90 tablet 11     lidocaine (XYLOCAINE) 5 % external ointment One application 4 x daily to affected areas lower back and knees if necessary Profile Rx: patient will contact pharmacy when needed 150 g 3     linaclotide (LINZESS) 290 MCG capsule Take 1 capsule (290 mcg) by mouth every morning (before breakfast) 30 capsule 11     metoprolol succinate ER (TOPROL-XL) 25 MG 24 hr tablet TAKE 0.5 TABLETS (12.5 MG) BY MOUTH AT BEDTIME 45 tablet 3     Multiple Vitamin (MULTI-VITAMINS) TABS TK 1 T PO ONCE D  11     pantoprazole (PROTONIX) 40 MG EC tablet Take 1 tablet (40 mg) by mouth daily 90 tablet 1     polyethylene glycol-propylene glycol (SYSTANE) 0.4-0.3 % SOLN ophthalmic solution Place 1 drop into both eyes every hour as needed for dry eyes 15 mL 11     trolamine salicylate (ASPERCREME) 10 % external cream Apply topically as needed for moderate pain 226 g 11              Allergies   Allergen Reactions     Isoniazid      Pyrazinamide            Immunization History   Administered Date(s) Administered     Pneumococcal 23 valent 2006     Tdap (Adacel,Boostrix) 2006               reports no history of alcohol use.          reports no history of drug use.        family history includes Family History Negative in her father and mother.        She indicated that the status of her no family hx of is unknown. She indicated that her mother is . She indicated that her father is .             has a past surgical history that includes Hysterectomy ().         reports previously being sexually active and has had partner(s) who are Male.    .  Pediatric History   Patient Guardians     Not on file     Patient does not qualify to have social determinant information on file (likely too young).   Other Topics Concern     Parent/sibling w/ CABG, MI or angioplasty before 65F 55M? No   Social History  Narrative     Not on file               reports that she has never smoked. She has never used smokeless tobacco.        Medical, social, surgical, and family histories reviewed.        Labs reviewed in EPIC  Patient Active Problem List   Diagnosis     Health Care Home     Gastroesophageal reflux disease without esophagitis     DDD (degenerative disc disease), lumbar     Dysuria     Anxiety state     Urinary frequency     Tuberculosis of peripheral lymph nodes     Nonspecific abnormal results of thyroid function study     Pulmonary tuberculosis     PPD positive     Papanicolaou smear of cervix with atypical squamous cells of undetermined significance (ASC-US)     Osteoporosis     Tear film insufficiency     Memory loss     Headache     Abdominal pain     Closed fracture of triquetral (cuneiform) bone of wrist     Other chronic pain     Abnormality of gait     Hearing loss     Malaise and fatigue     Acute cystitis     Major depression in complete remission (H)     Sensory hearing loss, unilateral     Vitamin D deficiency disease     Hyperlipidemia LDL goal <160     Overweight (BMI 25.0-29.9)     Risk for falls     ACP (advance care planning)     Primary osteoarthritis of both knees     Tension headache     History of bloody stools     Essential hypertension, benign       Past Surgical History:   Procedure Laterality Date     HYSTERECTOMY  2004         Social History     Tobacco Use     Smoking status: Never Smoker     Smokeless tobacco: Never Used   Substance Use Topics     Alcohol use: No       Family History   Problem Relation Age of Onset     Family History Negative Mother      Family History Negative Father      Diabetes No family hx of      Coronary Artery Disease No family hx of      Hypertension No family hx of      Hyperlipidemia No family hx of      Cerebrovascular Disease No family hx of      Breast Cancer No family hx of      Colon Cancer No family hx of      Prostate Cancer No family hx of      Other  Cancer No family hx of      Depression No family hx of      Anxiety Disorder No family hx of      Mental Illness No family hx of      Substance Abuse No family hx of      Anesthesia Reaction No family hx of      Asthma No family hx of      Osteoporosis No family hx of      Genetic Disorder No family hx of      Thyroid Disease No family hx of      Obesity No family hx of      Unknown/Adopted No family hx of              Current Outpatient Medications   Medication Sig Dispense Refill     acetaminophen (TYLENOL) 500 MG tablet Take 1-2 tablets (500-1,000 mg) by mouth every 6 hours as needed for mild pain 90 tablet 11     alendronate (FOSAMAX) 35 MG tablet Take 1 tablet (35 mg) by mouth every 7 days 5 tablet 11     aspirin-acetaminophen-caffeine (EXCEDRIN MIGRAINE) 250-250-65 MG tablet Take 1 tablet by mouth every 6 hours as needed for headaches 80 tablet 1     atorvastatin (LIPITOR) 10 MG tablet Take 1 tablet (10 mg) by mouth daily 30 tablet 11     Blood Pressure Monitoring (BLOOD PRESSURE MONITOR 3) DESIREE 1 each 2 times daily 1 each 0     Cholecalciferol (VITAMIN D3) 50 MCG (2000 UT) TABS Take 2 tablets by mouth daily (with breakfast) 60 tablet 11     fish oil-omega-3 fatty acids 1000 MG capsule Take 1 capsule (1 g) by mouth daily 90 capsule 11     glucosamine-chondroitinoitin 500-400 MG tablet Take 3 tablets by mouth daily 90 tablet 11     lidocaine (XYLOCAINE) 5 % external ointment One application 4 x daily to affected areas lower back and knees if necessary Profile Rx: patient will contact pharmacy when needed 150 g 3     linaclotide (LINZESS) 290 MCG capsule Take 1 capsule (290 mcg) by mouth every morning (before breakfast) 30 capsule 11     metoprolol succinate ER (TOPROL-XL) 25 MG 24 hr tablet TAKE 0.5 TABLETS (12.5 MG) BY MOUTH AT BEDTIME 45 tablet 3     Multiple Vitamin (MULTI-VITAMINS) TABS TK 1 T PO ONCE D  11     pantoprazole (PROTONIX) 40 MG EC tablet Take 1 tablet (40 mg) by mouth daily 90 tablet 1      polyethylene glycol-propylene glycol (SYSTANE) 0.4-0.3 % SOLN ophthalmic solution Place 1 drop into both eyes every hour as needed for dry eyes 15 mL 11     trolamine salicylate (ASPERCREME) 10 % external cream Apply topically as needed for moderate pain 226 g 11           Recent Labs   Lab Test 05/11/18  1634 07/18/17  1605  04/13/16  0943 12/04/15  1530 04/10/15  1257 03/04/14  1050 09/13/13  1125   LDL 98  --   --  126* 125* 116 134* 120   HDL  --   --   --  41* 53 42* 42* 41*   TRIG  --   --   --  131 100 77 84 99   ALT 33 25  --   --  30 44 22  --    CR 0.71 0.67   < > 0.70  --  0.80 0.70 0.70   GFRESTIMATED 79 85   < > 81  --  70 82 82   GFRESTBLACK >90 >90  African American GFR Calc     < > >90  --  85 >90 >90   POTASSIUM 5.2 4.0   < > 4.0  --  4.7 4.5 4.4   TSH  --   --   --   --   --   --  1.47 1.86    < > = values in this interval not displayed.            BP Readings from Last 6 Encounters:   11/07/19 118/68   08/23/19 124/64   07/11/19 104/70   06/25/19 172/81   05/01/19 140/82   04/18/19 126/68           Wt Readings from Last 3 Encounters:   11/07/19 69.4 kg (153 lb)   08/23/19 70.3 kg (155 lb)   07/11/19 71.2 kg (157 lb)                 Positive symptoms or findings indicated by bold designation:         ROS: 10 point ROS neg other than the symptoms noted above in the HPI.except  has Health Care Home; Gastroesophageal reflux disease without esophagitis; DDD (degenerative disc disease), lumbar; Dysuria; Anxiety state; Urinary frequency; Tuberculosis of peripheral lymph nodes; Nonspecific abnormal results of thyroid function study; Pulmonary tuberculosis; PPD positive; Papanicolaou smear of cervix with atypical squamous cells of undetermined significance (ASC-US); Osteoporosis; Tear film insufficiency; Memory loss; Headache; Abdominal pain; Closed fracture of triquetral (cuneiform) bone of wrist; Other chronic pain; Abnormality of gait; Hearing loss; Malaise and fatigue; Acute cystitis; Major depression  in complete remission (H); Sensory hearing loss, unilateral; Vitamin D deficiency disease; Hyperlipidemia LDL goal <160; Overweight (BMI 25.0-29.9); Risk for falls; ACP (advance care planning); Primary osteoarthritis of both knees; Tension headache; History of bloody stools; and Essential hypertension, benign on their problem list.  Review Of Systems    Skin: negative    Eyes: negative    Ears/Nose/Throat:  HEARING LOSS     Respiratory: No shortness of breath, dyspnea on exertion, cough, or hemoptysis    HISTORY OF TUBERCULOSIS     Cardiovascular: negative    Gastrointestinal: negative    Genitourinary:  URINE FREQUENCY    Musculoskeletal: back pain and arthritis    Neurologic: negative    Psychiatric: ANXIETY     Hematologic/Lymphatic/Immunologic: negative    Endocrine: negative                PE:  /68   Pulse 54   Temp 99  F (37.2  C) (Tympanic)   Resp 16   Wt 69.4 kg (153 lb)   LMP  (LMP Unknown)   SpO2 98%   BMI 26.77 kg/m   Body mass index is 26.77 kg/m .        Constitutional: general appearance, well nourished, well developed, in no acute distress, well developed, appears stated age, normal body habitus,  OVER WEIGHT           Eyes:; The patient has normal eyelids sclerae and conjunctivae :          Ears/Nose/Throat: external ear, overall: normal appearance; external nose, overall: benign appearance, normal moujth gums and lips            Neck: thyroid, overall: normal size, normal consistency, nontender,          Respiratory:  palpation of chest, overall: normal excursion,    Clear to percussion and auscultation     NO Tachypnea    NORMAL  Color          Cardiovascular:  Good color with no peripheral edema    Regular sinus rhythm without murmur.   Physiologic heart sounds   Heart is unelarged    .     Chest/Breast: normal shape           Abdominal exam,  Liver and spleen are  unenlarged        Tenderness    Scars              Urogenital; no renal, flank or bladder  tenderness;           Lymphatic: neck nodes,     Other nodes         Musculoskeletal:  Brief ortho exam normal except:   DECREASE RANGE OF MOTION OF BACK     MILD KNEE PAIN     ANTALGIC GAIT           Integument: inspection of skin, no rash, lesions; and, palpation, no induration, no tenderness.          Neurologic mental status, overall: alert and oriented; gait, no ataxia, no unsteadiness; coordination, no tremors; cranial nerves, overall: normal motor, overall: normal bulk, tone.          Psychiatric: orientation/consciousness, overall: oriented to person, place and time; behavior/psychomotor activity, no tics, normal psychomotor activity; mood and affect, overall: normal mood and affect; appearance, overall: well-groomed, good eye contact; speech, overall: normal quality, no aphasia and normal quality, quantity, intact.        Diagnostic Test Results:  No results found for any visits on 11/07/19.        ICD-10-CM    1. Vitamin D deficiency disease E55.9    2. Gastroesophageal reflux disease without esophagitis K21.9 pantoprazole (PROTONIX) 40 MG EC tablet   3. Urinary frequency R35.0    4. Tension headache G44.209    5. Insufficiency of tear film of both eyes H04.123    6. Sensory hearing loss, unilateral H90.5    7. Primary osteoarthritis of both knees M17.0 fish oil-omega-3 fatty acids 1000 MG capsule   8. Memory loss R41.3    9. Essential hypertension, benign I10 metoprolol succinate ER (TOPROL-XL) 25 MG 24 hr tablet     Blood Pressure Monitoring (BLOOD PRESSURE MONITOR 3) DESIREE   10. DDD (degenerative disc disease), lumbar M51.36    11. Dysuria R30.0    12. Chronic pain of both knees M25.561 acetaminophen (TYLENOL) 500 MG tablet    M25.562 glucosamine-chondroitinoitin 500-400 MG tablet    G89.29 trolamine salicylate (ASPERCREME) 10 % external cream   13. Age-related osteoporosis without current pathological fracture M81.0 alendronate (FOSAMAX) 35 MG tablet   14. Episodic tension-type headache, not intractable G44.219  aspirin-acetaminophen-caffeine (EXCEDRIN MIGRAINE) 250-250-65 MG tablet     metoprolol succinate ER (TOPROL-XL) 25 MG 24 hr tablet   15. Hyperlipidemia LDL goal <160 E78.5 atorvastatin (LIPITOR) 10 MG tablet   16. Vitamin D insufficiency E55.9 Cholecalciferol (VITAMIN D3) 50 MCG (2000 UT) TABS   17. Age related osteoporosis, unspecified pathological fracture presence M81.0 lidocaine (XYLOCAINE) 5 % external ointment   18. Chronic idiopathic constipation K59.04 linaclotide (LINZESS) 290 MCG capsule   19. Dry eye syndrome of both eyes H04.123 polyethylene glycol-propylene glycol (SYSTANE) 0.4-0.3 % SOLN ophthalmic solution              .    Side effects benefits and risks thoroughly discussed. .she may come in early if unimproved or getting worse          Please drink 2 glasses of water prior to meals and walk 15-30 minutes after meals        I spent  25 MINUTES SPENT  with patient discussing the following issues    The primary encounter diagnosis was Vitamin D deficiency disease. Diagnoses of Gastroesophageal reflux disease without esophagitis, Urinary frequency, Tension headache, Insufficiency of tear film of both eyes, Sensory hearing loss, unilateral, Primary osteoarthritis of both knees, Memory loss, Essential hypertension, benign, DDD (degenerative disc disease), lumbar, Dysuria, Chronic pain of both knees, Age-related osteoporosis without current pathological fracture, Episodic tension-type headache, not intractable, Hyperlipidemia LDL goal <160, Vitamin D insufficiency, Age related osteoporosis, unspecified pathological fracture presence, Chronic idiopathic constipation, and Dry eye syndrome of both eyes were also pertinent to this visit. over half of which involved counseling and coordination of care.      Patient Instructions   (E55.9) Vitamin D deficiency disease  (primary encounter diagnosis)  Comment:    Plan:      (K21.9) Gastroesophageal reflux disease without esophagitis  Comment:    Plan: pantoprazole  (PROTONIX) 40 MG EC tablet             (R35.0) Urinary frequency  Comment:    Plan:       (G44.209) Tension headache  Comment:    Plan:      (H04.123) Insufficiency of tear film of both eyes  Comment:    Plan:      (H90.5) Sensory hearing loss, unilateral  Comment:    Plan:      (M17.0) Primary osteoarthritis of both knees  Comment:    Plan: fish oil-omega-3 fatty acids 1000 MG capsule             (R41.3) Memory loss  Comment:    Plan:      (I10) Essential hypertension, benign  Comment:     Plan: metoprolol succinate ER (TOPROL-XL) 25 MG 24 hr        tablet             (M51.36) DDD (degenerative disc disease), lumbar  Comment:    Plan:      (R30.0) Dysuria  Comment:    Plan:      (M25.561,  M25.562,  G89.29) Chronic pain of both knees  Comment:    Plan: acetaminophen (TYLENOL) 500 MG tablet,         glucosamine-chondroitinoitin 500-400 MG tablet,        trolamine salicylate (ASPERCREME) 10 % external        cream              (M81.0) Age-related osteoporosis without current pathological fracture  Comment:    Plan: alendronate (FOSAMAX) 35 MG tablet             (G44.219) Episodic tension-type headache, not intractable  Comment:    Plan: aspirin-acetaminophen-caffeine (EXCEDRIN         MIGRAINE) 250-250-65 MG tablet, metoprolol         succinate ER (TOPROL-XL) 25 MG 24 hr tablet             (E78.5) Hyperlipidemia LDL goal <160  Comment:    Plan: atorvastatin (LIPITOR) 10 MG tablet             (E55.9) Vitamin D insufficiency  Comment:     Plan: Cholecalciferol (VITAMIN D3) 50 MCG (2000 UT)         TABS             (M81.0) Age related osteoporosis, unspecified pathological fracture presence  Comment:    Plan: lidocaine (XYLOCAINE) 5 % external ointment             (K59.04) Chronic idiopathic constipation  Comment:    Plan: linaclotide (LINZESS) 290 MCG capsule              (H04.123) Dry eye syndrome of both eyes  Comment:       Plan: polyethylene glycol-propylene glycol (SYSTANE)         0.4-0.3 % SOLN ophthalmic  solution                             ALL THE ABOVE PROBLEMS ARE STABLE AND MED CHANGES AS NOTED        Diet: MEDITERRANEAN DIET         Exercise:  WALK IN PLACE 15 MINUTES WITH EACH MEAL  BALANCE AND STRENTHENING  Exercises Range of motion, balance, isometric, and strengthening exercises 30 repetitions twice daily of involved joints            .GAYLA VEGA MD 11/7/2019 1:07 PM  November 7, 2019

## 2019-11-11 RX ORDER — ALENDRONATE SODIUM 35 MG/1
TABLET ORAL
Qty: 12 TABLET | Refills: 11 | OUTPATIENT
Start: 2019-11-11

## 2019-11-13 ENCOUNTER — TELEPHONE (OUTPATIENT)
Dept: FAMILY MEDICINE | Facility: CLINIC | Age: 79
End: 2019-11-13

## 2019-11-13 NOTE — LETTER
[facility]  [address]  [address]  [place] [address] [phone]                                                                                                           Rody Gonzalez  [address]   [place] [address]    November 13, 2019      Dear Rody,    I hope you are doing well.       This is a reminder that you are due for a Wellness Visit (annual full physical).   So that you get your desired appointment time, please call [phone] or [phone] to schedule this or you can use [employer] if you have an account. If you have had this visit completed elsewhere, or you believe you received this letter in error, please contact us at [phone] or [phone].    In addition, here is a list of due or overdue Health Maintenance reminders.    Health Maintenance Due   Topic     ZOSTER IMMUNIZATION (1 of 2)     PNEUMOCOCCAL IMMUNIZATION 65+ LOW/MEDIUM RISK (2 of 2 - PCV13)     MEDICARE ANNUAL WELLNESS VISIT      DTAP/TDAP/TD IMMUNIZATION (2 - Td)     LIPID      INFLUENZA VACCINE (1)       Best Regards,    Kevin Slaughter MD and Princess LUÍS Cabello [facility]

## 2019-11-13 NOTE — TELEPHONE ENCOUNTER
Panel Management Review      Patient has the following on her problem list:       IVD   ASA: MONITOR    Last LDL:    Lab Results   Component Value Date    CHOL 193 04/13/2016     Lab Results   Component Value Date    HDL 41 04/13/2016     Lab Results   Component Value Date    LDL 98 05/11/2018     04/13/2016     Lab Results   Component Value Date    TRIG 131 04/13/2016        Lab Results   Component Value Date    CHOLHDLRATIO 4.1 04/10/2015        Is the patient on a Statin? YES   Is the patient on Aspirin? NO                  Medications     HMG CoA Reductase Inhibitors     atorvastatin (LIPITOR) 10 MG tablet             Last three blood pressure readings:  BP Readings from Last 3 Encounters:   11/07/19 118/68   08/23/19 124/64   07/11/19 104/70        Tobacco History:     History   Smoking Status     Never Smoker   Smokeless Tobacco     Never Used       Hypertension   Last three blood pressure readings:  BP Readings from Last 3 Encounters:   11/07/19 118/68   08/23/19 124/64   07/11/19 104/70     Blood pressure: MONITOR    HTN Guidelines:  Less than 140/90      Composite cancer screening  Chart review shows that this patient is due/due soon for the following None  Summary:    Patient is due/failing the following:   PHYSICAL    Action needed:   Patient needs office visit for MEDICARE VISIT.    Type of outreach:    Sent letter.    Questions for provider review:    None                                                                                                                                    Princess LUÍS Cabello CMA       Chart routed to none .

## 2019-12-26 NOTE — PROGRESS NOTES
AUDIOLOGY REPORT    SUBJECTIVE: Rody Gonzalez is a 79 year old female who was seen in the Audiology Clinic at the [facility] for a fitting of Resound Linx 3D 7 in-the-ear (ITE) . [place] discuss concerns with hearing and functional communication difficulties. The patient was accompanied by their daughter. 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    OBJECTIVE: The hearing aid conformity evaluation was completed.The hearing aids were placed and they provided a good fit. Simulated Real-ear-probe-microphone measurements were completed on the [employer] system (pt late) and were a good match to NAL-NL1 target with soft sounds audible, moderate sounds comfortable, and loud sounds below discomfort. UCLs are verified through maximum power output measures and demonstrate appropriate limiting of loud inputs. Rody felt it was too loud so gain was decreased overall by 5 dB. She and her daughter were then oriented to proper hearing aid use, care, cleaning (no water, dry brush), batteries (size 13, insertion/removal, toxicity, low-battery signal), aid insertion/removal, user booklet, warranty information, storage cases, and other hearing aid details. The patient confirmed understanding of hearing aid use and care, and showed proper insertion of hearing aid and batteries while in the office today.Rody reported good volume and sound quality today.     Patient had some difficulty with insertion today. She does have family members to help her with the hearing aids.  Hearing aids were programmed as follows:  Program 1:Auto only no VC    EAR(S) FIT: Bilateral  HEARING AID MODEL NAME: Resound Linx 3D 7  HEARING AID STYLE: Full Shell  in-the-ear  SERIAL NUMBERS: Right: [id number] Left:: [id number]  WARRANTY END DATE: 10/23/2022    ASSESSMENT: Resound Linx 3D 7 ITE hearing aid(s) were fit today. Verification measures were performed. Rody signed the Hearing Aid Purchase Agreement and was given a copy, as well as details on her hearing aids. Patient was counseled that exact out of pocket amounts cannot be determined for hearing aid claims being sent to insurance. Any insurance coverage information presented to the patient is an estimate only, and is not a guarantee of payment. Patient has been advised to check with their own insurance.    PLAN:Rody will return for follow-up in 2-3 weeks for a hearing aid review appointment. We will review that she can put them in on her own, or that a family member is helping her. Please call this clinic with questions regarding today s appointment.      Satya Kelley, [facility]  Licensed Audiologist  [place] #[id number]

## 2019-12-27 ENCOUNTER — OFFICE VISIT (OUTPATIENT)
Dept: AUDIOLOGY | Facility: CLINIC | Age: 79
End: 2019-12-27
Payer: COMMERCIAL

## 2019-12-27 DIAGNOSIS — H90.6 MIXED HEARING LOSS, BILATERAL: Primary | ICD-10-CM

## 2020-01-22 ENCOUNTER — PATIENT OUTREACH (OUTPATIENT)
Dept: GERIATRIC MEDICINE | Facility: CLINIC | Age: 80
End: 2020-01-22

## 2020-01-22 NOTE — PROGRESS NOTES
[facility] Care Coordination Contact    Called member to schedule annual HRA home visit. HRA has been scheduled for 01/30/2020.     Johnna Malin RN BSN PHN  [facility]   RN Care Coordinator   Phone:   [phone]  Fax:       [phone]

## 2020-01-30 ENCOUNTER — PATIENT OUTREACH (OUTPATIENT)
Dept: GERIATRIC MEDICINE | Facility: CLINIC | Age: 80
End: 2020-01-30

## 2020-01-30 ASSESSMENT — PATIENT HEALTH QUESTIONNAIRE - PHQ9: SUM OF ALL RESPONSES TO PHQ QUESTIONS 1-9: 6

## 2020-01-30 ASSESSMENT — ACTIVITIES OF DAILY LIVING (ADL): DEPENDENT_IADLS:: CLEANING;LAUNDRY;COOKING;SHOPPING;MEAL PREPARATION;TRANSPORTATION;MEDICATION MANAGEMENT

## 2020-01-30 NOTE — PROGRESS NOTES
"[facility] Care Coordination Contact    [facility] Home Visit Assessment     Home visit for Health Risk Assessment with Rody Gonzalez completed on January 30, 2020    Type of residence:: Apartment  Current living arrangement:: I live alone     Assessment completed with:: Patient    Current Care Plan  Member currently receiving the following home care services:  N/A   Member currently receiving the following community resources:  PCA, ADC, ADC with transportation and homemaking.      Medication Review  Medication reconciliation completed in Epic: Yes  Medication set-up & administration: Family/informal caregiver sets up daily.  Family caregiver administers medications.  Medication Risk Assessment Medication (1 or more, place referral to MTM): Lacks understanding of medication regimen  MTM Referral Placed: No: member declines at this time    Mental/Behavioral Health   Depression Screening: See PHQ assessment flowsheet.   Mental health DX:: Yes(Anxiety)      Mental Health Diagnosis: Yes: Anxiety  Mental Health Services: None: No further intervention needed at this time.    ADL/IADL Dependencies:   Dependent ADLs:: Ambulation-cane, Bathing, Dressing, Transfers  Dependent IADLs:: Cleaning, Laundry, Cooking, Shopping, Meal Preparation, Transportation, Medication Management    [facility] sponsored benefits: Shared information re: Silver Sneakers/gym memberships, ASA, Calcium +D.    PCA Assessment completed at visit: Yes     Elderly Waiver Eligibility: Yes-will continue on EW    Care Plan & Recommendations: Member reports that she is getting \"weak\" with fatigue and muscle pain but reports she continues to go to [facility].  Member denies any recent hosptilization.  Member is [place] and recent hearing exam but reports hearing aids are loose and keeps falling as well as not being able to hear from it.  Discussed going back to hearing aid provider for evaluation. CC to continue current POC including ADC, ADC with " transportation, PCA and homemaking.    See [facility]CC for detailed assessment information.    Follow-Up Plan: Member informed of future contact, plan to f/u with member with a 6 month telephone assessment.  Contact information shared with member and family, encouraged member to call with any questions or concerns at any time.    [place] care continuum providers: Please refer to Health Care Home on the Epic Problem List to view this patient's [facility] Care Plan Summary.    Johnna Malin RN BSN PHN  [facility]   RN Care Coordinator   Phone:   [phone]  Fax:       [phone]

## 2020-02-06 ENCOUNTER — TELEPHONE (OUTPATIENT)
Dept: FAMILY MEDICINE | Facility: CLINIC | Age: 80
End: 2020-02-06

## 2020-02-06 ENCOUNTER — OFFICE VISIT (OUTPATIENT)
Dept: FAMILY MEDICINE | Facility: CLINIC | Age: 80
End: 2020-02-06
Payer: COMMERCIAL

## 2020-02-06 VITALS
RESPIRATION RATE: 16 BRPM | WEIGHT: 155 LBS | SYSTOLIC BLOOD PRESSURE: 148 MMHG | DIASTOLIC BLOOD PRESSURE: 80 MMHG | OXYGEN SATURATION: 100 % | BODY MASS INDEX: 27.12 KG/M2 | TEMPERATURE: 98.5 F | HEART RATE: 64 BPM

## 2020-02-06 DIAGNOSIS — Z71.84 COUNSELING FOR TRAVEL: Primary | ICD-10-CM

## 2020-02-06 DIAGNOSIS — G44.219 EPISODIC TENSION-TYPE HEADACHE, NOT INTRACTABLE: ICD-10-CM

## 2020-02-06 DIAGNOSIS — I10 ESSENTIAL HYPERTENSION, BENIGN: ICD-10-CM

## 2020-02-06 PROCEDURE — 99214 OFFICE O/P EST MOD 30 MIN: CPT | Performed by: FAMILY MEDICINE

## 2020-02-06 RX ORDER — METOPROLOL SUCCINATE 25 MG/1
25 TABLET, EXTENDED RELEASE ORAL AT BEDTIME
Qty: 90 TABLET | Refills: 3 | Status: SHIPPED | OUTPATIENT
Start: 2020-02-06 | End: 2020-02-06

## 2020-02-06 RX ORDER — MEFLOQUINE HYDROCHLORIDE 250 MG/1
250 TABLET ORAL
Qty: 14 TABLET | Refills: 0 | Status: SHIPPED | OUTPATIENT
Start: 2020-02-28 | End: 2020-02-06

## 2020-02-06 RX ORDER — MEFLOQUINE HYDROCHLORIDE 250 MG/1
250 TABLET ORAL
Qty: 14 TABLET | Refills: 0 | Status: SHIPPED | OUTPATIENT
Start: 2020-02-26 | End: 2020-11-05

## 2020-02-06 RX ORDER — METOPROLOL SUCCINATE 25 MG/1
25 TABLET, EXTENDED RELEASE ORAL AT BEDTIME
Qty: 90 TABLET | Refills: 3 | Status: SHIPPED | OUTPATIENT
Start: 2020-02-06 | End: 2020-02-20

## 2020-02-06 RX ORDER — METOPROLOL SUCCINATE 25 MG/1
25 TABLET, EXTENDED RELEASE ORAL AT BEDTIME
Qty: 45 TABLET | Refills: 3 | Status: SHIPPED | OUTPATIENT
Start: 2020-02-06 | End: 2020-02-06

## 2020-02-06 NOTE — PATIENT INSTRUCTIONS
(Z71.84) Counseling for travel  (primary encounter diagnosis)  Comment:    Plan: mefloquine (LARIAM) 250 MG tablet, typhoid         (VIVOTIF) CR capsule, DISCONTINUED: mefloquine         (LARIAM) 250 MG tablet             (G44.219) Episodic tension-type headache, not intractable  Comment:    Plan: metoprolol succinate ER (TOPROL-XL) 25 MG 24 hr        tablet, DISCONTINUED: metoprolol succinate ER         (TOPROL-XL) 25 MG 24 hr tablet, DISCONTINUED:         metoprolol succinate ER (TOPROL-XL) 25 MG 24 hr        tablet             (I10) Essential hypertension, benign  Comment:    Plan: metoprolol succinate ER (TOPROL-XL) 25 MG 24 hr        tablet, DISCONTINUED: metoprolol succinate ER         (TOPROL-XL) 25 MG 24 hr tablet, DISCONTINUED:         metoprolol succinate ER (TOPROL-XL) 25 MG 24 hr        tablet

## 2020-02-06 NOTE — PROGRESS NOTES
Subjective     Rody Gonzalez is a 80 year old female who presents to clinic today for the following health issues:    HPI   Hypertension Follow-up      Do you check your blood pressure regularly outside of the clinic? No     Are you following a low salt diet? No    Are your blood pressures ever more than 140 on the top number (systolic) OR more   than 90 on the bottom number (diastolic), for example 140/90? No      How many servings of fruits and vegetables do you eat daily?  0-1    On average, how many sweetened beverages do you drink each day (Examples: soda, juice, sweet tea, etc.  Do NOT count diet or artificially sweetened beverages)?   0    How many days per week do you exercise enough to make your heart beat faster? 3 or less    How many minutes a day do you exercise enough to make your heart beat faster? 9 or less    How many days per week do you miss taking your medication? 0    (Z71.84) Counseling for travel  (primary encounter diagnosis)    Comment:      Plan: mefloquine (LARIAM) 250 MG tablet, typhoid           (VIVOTIF) CR capsule, DISCONTINUED: mefloquine           (LARIAM) 250 MG tablet                   (G44.219) Episodic tension-type headache, not intractable    Comment:      Plan: metoprolol succinate ER (TOPROL-XL) 25 MG 24 hr          tablet, DISCONTINUED: metoprolol succinate ER           (TOPROL-XL) 25 MG 24 hr tablet, DISCONTINUED:           metoprolol succinate ER (TOPROL-XL) 25 MG 24 hr          Tablet    CHANGING  TO 25MG AT HOUR OF SLEEP         There are no preventive care reminders to display for this patient.      .  Current Outpatient Medications   Medication Sig Dispense Refill     acetaminophen (TYLENOL) 500 MG tablet Take 1-2 tablets (500-1,000 mg) by mouth every 6 hours as needed for mild pain 90 tablet 11     alendronate (FOSAMAX) 35 MG tablet Take 1 tablet (35 mg) by mouth every 7 days 5 tablet 11     aspirin-acetaminophen-caffeine (EXCEDRIN MIGRAINE) 250-250-65 MG tablet Take 1  tablet by mouth every 6 hours as needed for headaches 80 tablet 1     atorvastatin (LIPITOR) 10 MG tablet Take 1 tablet (10 mg) by mouth daily 30 tablet 11     Blood Pressure Monitoring (BLOOD PRESSURE MONITOR 3) DESIREE 1 each 2 times daily 1 each 0     Cholecalciferol (VITAMIN D3) 50 MCG (2000 UT) TABS Take 2 tablets by mouth daily (with breakfast) 60 tablet 11     fish oil-omega-3 fatty acids 1000 MG capsule Take 1 capsule (1 g) by mouth daily 90 capsule 11     glucosamine-chondroitinoitin 500-400 MG tablet Take 3 tablets by mouth daily 90 tablet 11     lidocaine (XYLOCAINE) 5 % external ointment One application 4 x daily to affected areas lower back and knees if necessary Profile Rx: patient will contact pharmacy when needed 150 g 3     linaclotide (LINZESS) 290 MCG capsule Take 1 capsule (290 mcg) by mouth every morning (before breakfast) 30 capsule 11     [START ON 2/26/2020] mefloquine (LARIAM) 250 MG tablet Take 1 tablet (250 mg) by mouth every 7 days 14 tablet 0     metoprolol succinate ER (TOPROL-XL) 25 MG 24 hr tablet Take 1 tablet (25 mg) by mouth At Bedtime TA 90 tablet 3     Multiple Vitamin (MULTI-VITAMINS) TABS TK 1 T PO ONCE D  11     pantoprazole (PROTONIX) 40 MG EC tablet Take 1 tablet (40 mg) by mouth daily 90 tablet 1     polyethylene glycol-propylene glycol (SYSTANE) 0.4-0.3 % SOLN ophthalmic solution Place 1 drop into both eyes every hour as needed for dry eyes 15 mL 11     trolamine salicylate (ASPERCREME) 10 % external cream Apply topically as needed for moderate pain 226 g 11     typhoid (VIVOTIF) CR capsule Take 1 capsule by mouth every other day for 4 doses One capsule on alternate days (day 1, 3, 5, and 7) for a total of 4 doses; all doses should be complete at least 1 week prior to potential exposure. 4 capsule 0          Allergies   Allergen Reactions     Isoniazid      Pyrazinamide        Immunization History   Administered Date(s) Administered     Influenza (IIV3) PF 09/02/2014      Pneumococcal 23 valent 2006     Td (Adult), Adsorbed 2005, 2005, 2006     Tdap (Adacel,Boostrix) 2006         reports no history of alcohol use.      reports no history of drug use.    family history includes Family History Negative in her father and mother.    She indicated that the status of her no family hx of is unknown. She indicated that her mother is . She indicated that her father is .       has a past surgical history that includes Hysterectomy ().     reports previously being sexually active and has had partner(s) who are Male.  .  Pediatric History   Patient Parents     Not on file     Other Topics Concern     Parent/sibling w/ CABG, MI or angioplasty before 65F 55M? No   Social History Narrative     Not on file         reports that she has never smoked. She has never used smokeless tobacco.    Medical, social, surgical, and family histories reviewed.    Labs reviewed in EPIC  Patient Active Problem List   Diagnosis     Health Care Home     Gastroesophageal reflux disease without esophagitis     DDD (degenerative disc disease), lumbar     Dysuria     Anxiety state     Urinary frequency     Tuberculosis of peripheral lymph nodes     Nonspecific abnormal results of thyroid function study     Pulmonary tuberculosis     PPD positive     Papanicolaou smear of cervix with atypical squamous cells of undetermined significance (ASC-US)     Osteoporosis     Tear film insufficiency     Memory loss     Headache     Abdominal pain     Closed fracture of triquetral (cuneiform) bone of wrist     Other chronic pain     Abnormality of gait     Hearing loss     Malaise and fatigue     Acute cystitis     Major depression in complete remission (H)     Sensory hearing loss, unilateral     Vitamin D deficiency disease     Hyperlipidemia LDL goal <160     Overweight (BMI 25.0-29.9)     Risk for falls     ACP (advance care planning)     Primary osteoarthritis of both knees      Tension headache     History of bloody stools     Essential hypertension, benign     Past Surgical History:   Procedure Laterality Date     HYSTERECTOMY  2004       Social History     Tobacco Use     Smoking status: Never Smoker     Smokeless tobacco: Never Used   Substance Use Topics     Alcohol use: No     Family History   Problem Relation Age of Onset     Family History Negative Mother      Family History Negative Father      Diabetes No family hx of      Coronary Artery Disease No family hx of      Hypertension No family hx of      Hyperlipidemia No family hx of      Cerebrovascular Disease No family hx of      Breast Cancer No family hx of      Colon Cancer No family hx of      Prostate Cancer No family hx of      Other Cancer No family hx of      Depression No family hx of      Anxiety Disorder No family hx of      Mental Illness No family hx of      Substance Abuse No family hx of      Anesthesia Reaction No family hx of      Asthma No family hx of      Osteoporosis No family hx of      Genetic Disorder No family hx of      Thyroid Disease No family hx of      Obesity No family hx of      Unknown/Adopted No family hx of          Current Outpatient Medications   Medication Sig Dispense Refill     acetaminophen (TYLENOL) 500 MG tablet Take 1-2 tablets (500-1,000 mg) by mouth every 6 hours as needed for mild pain 90 tablet 11     alendronate (FOSAMAX) 35 MG tablet Take 1 tablet (35 mg) by mouth every 7 days 5 tablet 11     aspirin-acetaminophen-caffeine (EXCEDRIN MIGRAINE) 250-250-65 MG tablet Take 1 tablet by mouth every 6 hours as needed for headaches 80 tablet 1     atorvastatin (LIPITOR) 10 MG tablet Take 1 tablet (10 mg) by mouth daily 30 tablet 11     Blood Pressure Monitoring (BLOOD PRESSURE MONITOR 3) DESIREE 1 each 2 times daily 1 each 0     Cholecalciferol (VITAMIN D3) 50 MCG (2000 UT) TABS Take 2 tablets by mouth daily (with breakfast) 60 tablet 11     fish oil-omega-3 fatty acids 1000 MG capsule Take 1  capsule (1 g) by mouth daily 90 capsule 11     glucosamine-chondroitinoitin 500-400 MG tablet Take 3 tablets by mouth daily 90 tablet 11     lidocaine (XYLOCAINE) 5 % external ointment One application 4 x daily to affected areas lower back and knees if necessary Profile Rx: patient will contact pharmacy when needed 150 g 3     linaclotide (LINZESS) 290 MCG capsule Take 1 capsule (290 mcg) by mouth every morning (before breakfast) 30 capsule 11     [START ON 2/26/2020] mefloquine (LARIAM) 250 MG tablet Take 1 tablet (250 mg) by mouth every 7 days 14 tablet 0     metoprolol succinate ER (TOPROL-XL) 25 MG 24 hr tablet Take 1 tablet (25 mg) by mouth At Bedtime TA 90 tablet 3     Multiple Vitamin (MULTI-VITAMINS) TABS TK 1 T PO ONCE D  11     pantoprazole (PROTONIX) 40 MG EC tablet Take 1 tablet (40 mg) by mouth daily 90 tablet 1     polyethylene glycol-propylene glycol (SYSTANE) 0.4-0.3 % SOLN ophthalmic solution Place 1 drop into both eyes every hour as needed for dry eyes 15 mL 11     trolamine salicylate (ASPERCREME) 10 % external cream Apply topically as needed for moderate pain 226 g 11     typhoid (VIVOTIF) CR capsule Take 1 capsule by mouth every other day for 4 doses One capsule on alternate days (day 1, 3, 5, and 7) for a total of 4 doses; all doses should be complete at least 1 week prior to potential exposure. 4 capsule 0       Recent Labs   Lab Test 05/11/18  1634 07/18/17  1605  04/13/16  0943 12/04/15  1530 04/10/15  1257 03/04/14  1050 09/13/13  1125   LDL 98  --   --  126* 125* 116 134* 120   HDL  --   --   --  41* 53 42* 42* 41*   TRIG  --   --   --  131 100 77 84 99   ALT 33 25  --   --  30 44 22  --    CR 0.71 0.67   < > 0.70  --  0.80 0.70 0.70   GFRESTIMATED 79 85   < > 81  --  70 82 82   GFRESTBLACK >90 >90  African American GFR Calc     < > >90  --  85 >90 >90   POTASSIUM 5.2 4.0   < > 4.0  --  4.7 4.5 4.4   TSH  --   --   --   --   --   --  1.47 1.86    < > = values in this interval not  displayed.        BP Readings from Last 6 Encounters:   02/06/20 (!) 148/80   11/07/19 118/68   08/23/19 124/64   07/11/19 104/70   06/25/19 172/81   05/01/19 140/82       Wt Readings from Last 3 Encounters:   02/06/20 70.3 kg (155 lb)   11/07/19 69.4 kg (153 lb)   08/23/19 70.3 kg (155 lb)         Positive symptoms or findings indicated by bold designation:     ROS: 10 point ROS neg other than the symptoms noted above in the HPI.except  has Health Care Home; Gastroesophageal reflux disease without esophagitis; DDD (degenerative disc disease), lumbar; Dysuria; Anxiety state; Urinary frequency; Tuberculosis of peripheral lymph nodes; Nonspecific abnormal results of thyroid function study; Pulmonary tuberculosis; PPD positive; Papanicolaou smear of cervix with atypical squamous cells of undetermined significance (ASC-US); Osteoporosis; Tear film insufficiency; Memory loss; Headache; Abdominal pain; Closed fracture of triquetral (cuneiform) bone of wrist; Other chronic pain; Abnormality of gait; Hearing loss; Malaise and fatigue; Acute cystitis; Major depression in complete remission (H); Sensory hearing loss, unilateral; Vitamin D deficiency disease; Hyperlipidemia LDL goal <160; Overweight (BMI 25.0-29.9); Risk for falls; ACP (advance care planning); Primary osteoarthritis of both knees; Tension headache; History of bloody stools; and Essential hypertension, benign on their problem list.  Review Of Systems  Skin: negative  Eyes: negative  Ears/Nose/Throat:  BILATERAL HEARING LOSS   Respiratory: No shortness of breath, dyspnea on exertion, cough, or hemoptysis  Cardiovascular: HYPERTENSION WITH GOAL OF LESS THAN 140/80   Gastrointestinal: GASTROESOPHAGEAL REFLUX DISEASE WITHOUT ESOPHAGITIS   Genitourinary: negative  Musculoskeletal: negative  Neurologic:  MEMORY LOSS   OCCASIONAL HEADACHE   Psychiatric:  ANXIETY AND DEPRESSION   Hematologic/Lymphatic/Immunologic: negative  Endocrine:  OVER WEIGHT         PE:  BP (!)  148/80   Pulse 64   Temp 98.5  F (36.9  C) (Tympanic)   Resp 16   Wt 70.3 kg (155 lb)   LMP  (LMP Unknown)   SpO2 100%   BMI 27.12 kg/m   Body mass index is 27.12 kg/m .    Constitutional: general appearance, well nourished, well developed, in no acute distress, well developed, appears stated age, normal body habitus,      Eyes:; The patient has normal eyelids sclerae and conjunctivae :      Ears/Nose/Throat: external ear, overall: normal appearance; external nose, overall: benign appearance, normal moujth gums and lips       Neck: thyroid, overall: normal size, normal consistency, nontender,      Respiratory:  palpation of chest, overall: normal excursion,    Clear to percussion and auscultation     NO Tachypnea    NORMAL  Color      Cardiovascular:  Good color with no peripheral edema    Regular sinus rhythm without murmur.  Physiologic heart sounds   Heart is unelarged  .   Chest/Breast: normal shape       Abdominal exam,  Liver and spleen are  unenlarged        Tenderness    Scars        Urogenital; no renal, flank or bladder  tenderness;      Lymphatic: neck nodes,     Other nodes     Musculoskeletal:  Brief ortho exam normal except:    LOWER BACK PAIN AND KNEE PAIN CONTROLLED AND DOING WELL      Integument: inspection of skin, no rash, lesions; and, palpation, no induration, no tenderness.      Neurologic mental status, overall: alert and oriented; gait, no ataxia, no unsteadiness; coordination, no tremors; cranial nerves, overall: normal motor, overall: normal bulk, tone.      Psychiatric: orientation/consciousness, overall: oriented to person, place and time; behavior/psychomotor activity, no tics, normal psychomotor activity; mood and affect, overall: normal mood and affect; appearance, overall: well-groomed, good eye contact; speech, overall: normal quality, no aphasia and normal quality, quantity, intact.      Diagnostic Test Results:  No results found for any visits on 02/06/20.      ICD-10-CM     1. Counseling for travel Z71.84 mefloquine (LARIAM) 250 MG tablet     typhoid (VIVOTIF) CR capsule     DISCONTINUED: mefloquine (LARIAM) 250 MG tablet   2. Episodic tension-type headache, not intractable G44.219 metoprolol succinate ER (TOPROL-XL) 25 MG 24 hr tablet     DISCONTINUED: metoprolol succinate ER (TOPROL-XL) 25 MG 24 hr tablet     DISCONTINUED: metoprolol succinate ER (TOPROL-XL) 25 MG 24 hr tablet   3. Essential hypertension, benign I10 metoprolol succinate ER (TOPROL-XL) 25 MG 24 hr tablet     DISCONTINUED: metoprolol succinate ER (TOPROL-XL) 25 MG 24 hr tablet     DISCONTINUED: metoprolol succinate ER (TOPROL-XL) 25 MG 24 hr tablet        .  Side effects benefits and risks thoroughly discussed. .she may come in early if unimproved or getting worse      Please drink 2 glasses of water prior to meals and walk 15-30 minutes after meals    I spent  25 MINUTES SPENT  with patient discussing the following issues    The primary encounter diagnosis was Counseling for travel. Diagnoses of Episodic tension-type headache, not intractable and Essential hypertension, benign were also pertinent to this visit. over half of which involved counseling and coordination of care.    Patient Instructions   (Z71.84) Counseling for travel  (primary encounter diagnosis)  Comment:    Plan: mefloquine (LARIAM) 250 MG tablet, typhoid         (VIVOTIF) CR capsule, DISCONTINUED: mefloquine         (LARIAM) 250 MG tablet             (G44.219) Episodic tension-type headache, not intractable  Comment:    Plan: metoprolol succinate ER (TOPROL-XL) 25 MG 24 hr        tablet, DISCONTINUED: metoprolol succinate ER         (TOPROL-XL) 25 MG 24 hr tablet, DISCONTINUED:         metoprolol succinate ER (TOPROL-XL) 25 MG 24 hr        tablet             (I10) Essential hypertension, benign  Comment:    Plan: metoprolol succinate ER (TOPROL-XL) 25 MG 24 hr        tablet, DISCONTINUED: metoprolol succinate ER         (TOPROL-XL) 25 MG 24 hr  tablet, DISCONTINUED:         metoprolol succinate ER (TOPROL-XL) 25 MG 24 hr        tablet                        ALL THE ABOVE PROBLEMS ARE STABLE AND MED CHANGES AS NOTED    Diet:  MEDITERRANEAN DIET     Exercise:  AS TOLERATED   Exercises Range of motion, balance, isometric, and strengthening exercises 30 repetitions twice daily of involved joints      .GAYLA VEGA MD 2/6/2020 12:25 PM  February 6, 2020

## 2020-02-06 NOTE — TELEPHONE ENCOUNTER
Patient's niece walked in clinic today reporting pt has elevated BP. Pt also walked in clinic. Per sophie with PCP approval was given to have patient scheduled this morning with him.

## 2020-02-11 ENCOUNTER — TELEPHONE (OUTPATIENT)
Dept: FAMILY MEDICINE | Facility: CLINIC | Age: 80
End: 2020-02-11

## 2020-02-11 RX ORDER — BLOOD PRESSURE TEST KIT-LARGE
1 KIT MISCELLANEOUS 2 TIMES DAILY
Qty: 1 EACH | Refills: 0 | Status: SHIPPED | OUTPATIENT
Start: 2020-02-11

## 2020-02-11 NOTE — TELEPHONE ENCOUNTER
Walk in from patient today.     States that PCP gave her a BP machine order while at [facility] last week and this was lost.     Requesting a new order.     Currently in clinic waiting for this order.

## 2020-02-13 ENCOUNTER — PATIENT OUTREACH (OUTPATIENT)
Dept: GERIATRIC MEDICINE | Facility: CLINIC | Age: 80
End: 2020-02-13

## 2020-02-13 NOTE — PROGRESS NOTES
[facility] Care Coordination Contact    Received after visit chart from care coordinator.  Completed following tasks: Mailed copy of care plan to client, Updated services in access and Submitted referrals/auths for ADC, transportation and homemaking  Chart was returned to CC.   , Provider Signature - No POC Shared:  Member indicates that they do not want their POC shared with any [facility] providers.    [facility]:  Emailed completed PCA assessment to [facility].  Faxed copy of PCA assessment to [facility] Agency and mailed copy to member.  Faxed MD Communication to PCP.     Nimisha Murillo  Care Management Specialist  [facility]  [phone]

## 2020-02-13 NOTE — LETTER
February 13, 2020      SKYE MUNOZ  [address]   [place] [place] [facility] [address]      Dear Skye:    At [facility], we are dedicated to improving your health and well-being. Enclosed is the Comprehensive Care Plan that we developed with you on 1/30/2020. Please review the Care Plan carefully.    As a reminder, some of the things we discussed at your visit include:    Your physical and mental health    Ways to reduce falls    Health care needs you may have    Don t forget to contact your care coordinator if you:    Have been hospitalized or plan to be hospitalized     Have had a fall     Have experienced a change in physical health    Are experiencing emotional problems     If you do not agree with your Care Plan, have questions about it, or have experienced a change in your needs, please call me at [phone]. If you are hearing impaired, please call the [place] [employer] at 845 or [phone] ([email or web address] relay service).    Sincerely,    CANDICE Collins, RN, PHN    E-mail: [email or web address]  Phone: [phone]      [facility] (hospitals) is a health plan that contracts with both Medicare and the [place] Medical Assistance (Medicaid) program to provide benefits of both programs to enrollees. Enrollment in [facility] depends on contract renewal.    [id number])     [id number] (11/18)

## 2020-02-18 RX ORDER — FLUOROMETHOLONE 0.1 %
SUSPENSION, DROPS(FINAL DOSAGE FORM)(ML) OPHTHALMIC (EYE)
COMMUNITY
Start: 2020-01-09 | End: 2020-11-05

## 2020-02-18 RX ORDER — NAPROXEN 500 MG/1
TABLET ORAL
COMMUNITY
Start: 2020-01-09 | End: 2020-02-20

## 2020-02-20 ENCOUNTER — OFFICE VISIT (OUTPATIENT)
Dept: FAMILY MEDICINE | Facility: CLINIC | Age: 80
End: 2020-02-20
Payer: COMMERCIAL

## 2020-02-20 VITALS
OXYGEN SATURATION: 100 % | HEART RATE: 64 BPM | TEMPERATURE: 97.4 F | RESPIRATION RATE: 16 BRPM | BODY MASS INDEX: 27.82 KG/M2 | WEIGHT: 159 LBS | SYSTOLIC BLOOD PRESSURE: 128 MMHG | DIASTOLIC BLOOD PRESSURE: 74 MMHG

## 2020-02-20 DIAGNOSIS — E78.5 HYPERLIPIDEMIA LDL GOAL <160: Chronic | ICD-10-CM

## 2020-02-20 DIAGNOSIS — G89.29 CHRONIC PAIN OF BOTH KNEES: ICD-10-CM

## 2020-02-20 DIAGNOSIS — E55.9 VITAMIN D INSUFFICIENCY: ICD-10-CM

## 2020-02-20 DIAGNOSIS — I10 ESSENTIAL HYPERTENSION, BENIGN: ICD-10-CM

## 2020-02-20 DIAGNOSIS — M25.561 CHRONIC PAIN OF BOTH KNEES: ICD-10-CM

## 2020-02-20 DIAGNOSIS — K59.04 CHRONIC IDIOPATHIC CONSTIPATION: ICD-10-CM

## 2020-02-20 DIAGNOSIS — H04.123 DRY EYE SYNDROME OF BOTH EYES: ICD-10-CM

## 2020-02-20 DIAGNOSIS — M25.562 CHRONIC PAIN OF BOTH KNEES: ICD-10-CM

## 2020-02-20 DIAGNOSIS — G44.219 EPISODIC TENSION-TYPE HEADACHE, NOT INTRACTABLE: ICD-10-CM

## 2020-02-20 DIAGNOSIS — M81.0 AGE-RELATED OSTEOPOROSIS WITHOUT CURRENT PATHOLOGICAL FRACTURE: ICD-10-CM

## 2020-02-20 LAB
ALT SERPL W P-5'-P-CCNC: 25 U/L (ref 0–50)
ANION GAP SERPL CALCULATED.3IONS-SCNC: 5 MMOL/L (ref 3–14)
BUN SERPL-MCNC: 16 MG/DL (ref 7–30)
CALCIUM SERPL-MCNC: 10.2 MG/DL (ref 8.5–10.1)
CHLORIDE SERPL-SCNC: 110 MMOL/L (ref 94–109)
CHOLEST SERPL-MCNC: 196 MG/DL
CO2 SERPL-SCNC: 25 MMOL/L (ref 20–32)
CREAT SERPL-MCNC: 0.67 MG/DL (ref 0.52–1.04)
GFR SERPL CREATININE-BSD FRML MDRD: 83 ML/MIN/{1.73_M2}
GLUCOSE SERPL-MCNC: 81 MG/DL (ref 70–99)
HDLC SERPL-MCNC: 45 MG/DL
LDLC SERPL CALC-MCNC: 136 MG/DL
NONHDLC SERPL-MCNC: 151 MG/DL
POTASSIUM SERPL-SCNC: 3.8 MMOL/L (ref 3.4–5.3)
SODIUM SERPL-SCNC: 140 MMOL/L (ref 133–144)
TRIGL SERPL-MCNC: 76 MG/DL

## 2020-02-20 PROCEDURE — 80061 LIPID PANEL: CPT | Performed by: FAMILY MEDICINE

## 2020-02-20 PROCEDURE — 99397 PER PM REEVAL EST PAT 65+ YR: CPT | Performed by: FAMILY MEDICINE

## 2020-02-20 PROCEDURE — 36415 COLL VENOUS BLD VENIPUNCTURE: CPT | Performed by: FAMILY MEDICINE

## 2020-02-20 PROCEDURE — 80048 BASIC METABOLIC PNL TOTAL CA: CPT | Performed by: FAMILY MEDICINE

## 2020-02-20 PROCEDURE — 99214 OFFICE O/P EST MOD 30 MIN: CPT | Mod: 25 | Performed by: FAMILY MEDICINE

## 2020-02-20 PROCEDURE — 84460 ALANINE AMINO (ALT) (SGPT): CPT | Performed by: FAMILY MEDICINE

## 2020-02-20 RX ORDER — RIBOFLAVIN (VITAMIN B2) 100 MG
3 TABLET ORAL DAILY
Qty: 270 TABLET | Refills: 11 | Status: SHIPPED | OUTPATIENT
Start: 2020-02-20 | End: 2020-11-05

## 2020-02-20 RX ORDER — CHOLECALCIFEROL (VITAMIN D3) 50 MCG
2 TABLET ORAL
Qty: 180 TABLET | Refills: 0 | Status: SHIPPED | OUTPATIENT
Start: 2020-02-20 | End: 2020-11-05

## 2020-02-20 RX ORDER — ATORVASTATIN CALCIUM 10 MG/1
10 TABLET, FILM COATED ORAL DAILY
Qty: 90 TABLET | Refills: 0 | Status: SHIPPED | OUTPATIENT
Start: 2020-02-20 | End: 2020-05-27

## 2020-02-20 RX ORDER — ALENDRONATE SODIUM 35 MG/1
35 TABLET ORAL
Qty: 5 TABLET | Refills: 11 | Status: SHIPPED | OUTPATIENT
Start: 2020-02-20 | End: 2020-11-05

## 2020-02-20 RX ORDER — ACETAMINOPHEN 500 MG
500-1000 TABLET ORAL EVERY 6 HOURS PRN
Qty: 180 TABLET | Refills: 0 | Status: SHIPPED | OUTPATIENT
Start: 2020-02-20

## 2020-02-20 RX ORDER — METOPROLOL SUCCINATE 25 MG/1
25 TABLET, EXTENDED RELEASE ORAL AT BEDTIME
Qty: 90 TABLET | Refills: 0 | Status: SHIPPED | OUTPATIENT
Start: 2020-02-20 | End: 2020-11-05

## 2020-02-20 NOTE — PROGRESS NOTES
"Subjective     Rody Gonzalez is a 80 year old female who presents to clinic today for the following health issues:    HPI   Medication Followup of gas med    Taking Medication as prescribed: yes    Side Effects:  None    Medication Helping Symptoms:  yes   This patient is traveling to [place] on the 26th of this month  She needs a 3-month supply of all her medications  She refuses an influenza vaccine although I recommended heartily    Annual Wellness Visit    Are you in the first 12 months of your Medicare Part B coverage?  No    Physical Health:    In general, how would you rate your overall physical health? good    Outside of work, how many days during the week do you exercise?4-5 days/week    Outside of work, approximately how many minutes a day do you exercise?15-30 minutes    If you drink alcohol do you typically have >3 drinks per day or >7 drinks per week? No    Do you usually eat at least 4 servings of fruit and vegetables a day, include whole grains & fiber and avoid regularly eating high fat or \"junk\" foods? Yes    Do you have any problems taking medications regularly? No    Do you have any side effects from medications? none    Needs assistance for the following daily activities: telephone use, transportation, shopping, preparing meals, housework and money management    Which of the following safety concerns are present in your home?  none identified     Hearing impairment: Yes, Difficulty following a conversation in a noisy restaurant or crowded room.    Fails to 20 dB screen left ear at 1000 and 4000 and 2000 Hz    In the past 6 months, have you been bothered by leaking of urine? no    Mental Health:    In general, how would you rate your overall mental or emotional health? good  PHQ-2 Score:      Do you feel safe in your environment? Yes    Have you ever done Advance Care Planning? (For example, a Health Directive, POLST, or a discussion with a medical provider or your loved ones about your wishes)? " No, advance care planning information given to patient to review.  Patient plans to discuss their wishes with loved ones or provider.      Fall risk:  Patient falls under knees from time to time  She has osteoarthritis in both knees  She is on eye   Also has dry eye syndrome    Review Of Systems  Skin: negative, negative for, pigmentation, acne, rash, scaling, itching, bruising, lumps or bumps, hair changes, nail changes,   She has had chronic itching skin from dry skin this time of year  Eyes: She has dry eye syndrome  Ears/Nose/Throat: Poor hearing left ear  Respiratory: No shortness of breath, dyspnea on exertion, cough, or hemoptysis  Cardiovascular: negative  Gastrointestinal: heartburn  Genitourinary: negative  Musculoskeletal: back pain, arthritis and bilateral osteoarthritis of both knees  Neurologic: negative  Psychiatric: negative  Hematologic/Lymphatic/Immunologic: negative  Endocrine: negative       click delete button to remove this line now  Cognitive Screening: Not appropriate due to mental handicap      Do you have sleep apnea, excessive snoring or daytime drowsiness?: no    Current providers sharing in care for this patient include:   Patient Care Team:  Osman Yen MD as PCP - General (Family Practice)  Johnna Malin RN as Lead Care Coordinator  Osman Yen MD as Assigned PCP    (M25.561,  M25.562,  G89.29) Chronic pain of both knees  Comment:    Plan: acetaminophen 500 MG PO tablet,         glucosamine-chondroitinoitin 500-400 MG PO         tablet         90-day supply of medication given    (M81.0) Age-related osteoporosis without current pathological fracture  Comment:    Plan: alendronate 35 MG PO tablet         Weekly side effects benefits and risks discussed she is tolerating reasonably well    (E78.5) Hyperlipidemia LDL goal <160  Comment:    Plan: Lipid panel reflex to direct LDL Fasting, ALT,         atorvastatin 10 MG PO tablet             (G44.219) Episodic  tension-type headache, not intractable  Comment:    Plan: metoprolol succinate ER 25 MG PO 24 hr tablet         Taking the metoprolol at bedtime for latest guidelines              Objective    /74   Pulse 64   Temp 97.4  F (36.3  C) (Tympanic)   Resp 16   Wt 72.1 kg (159 lb)   LMP  (LMP Unknown)   SpO2 100%   BMI 27.82 kg/m    Body mass index is 27.82 kg/m .  Physical Exam   GENERAL: healthy, alert and no distress  EYES: Eyes grossly normal to inspection, PERRL and conjunctivae and sclerae normal  HENT: normal cephalic/atraumatic, ear canals and TM's normal, nose and mouth without ulcers or lesions, oropharynx clear, oral mucous membranes moist and decreased hearing left ear 20 dB screen  NECK: no adenopathy, no asymmetry, masses, or scars and thyroid normal to palpation  RESP: lungs clear to auscultation - no rales, rhonchi or wheezes  BREAST: normal without masses, tenderness or nipple discharge and no palpable axillary masses or adenopathy  CV: regular rate and rhythm, normal S1 S2, no S3 or S4, no murmur, click or rub, no peripheral edema and peripheral pulses strong  ABDOMEN: soft, nontender, no hepatosplenomegaly, no masses and bowel sounds normal  MS: no gross musculoskeletal defects noted, no edema  Moderate osteoarthritis both knees  Decreased range of motion of  Neck negative straight leg raising test  SKIN: no suspicious lesions or rashes  NEURO: Normal strength and tone, mentation intact and speech normal  PSYCH: mentation appears normal, affect normal/bright    Diagnostic Test Results:  Labs reviewed in Epic        Assessment & Plan       ICD-10-CM    1. Chronic pain of both knees M25.561 acetaminophen 500 MG PO tablet    M25.562 glucosamine-chondroitinoitin 500-400 MG PO tablet    G89.29    2. Age-related osteoporosis without current pathological fracture M81.0 alendronate 35 MG PO tablet   3. Hyperlipidemia LDL goal <160 E78.5 Lipid panel reflex to direct LDL Fasting     ALT      "atorvastatin 10 MG PO tablet   4. Episodic tension-type headache, not intractable G44.219 metoprolol succinate ER 25 MG PO 24 hr tablet   5. Vitamin D insufficiency E55.9 Cholecalciferol (VITAMIN D3) 50 MCG (2000 UT) PO TABS   6. Chronic idiopathic constipation K59.04 linaclotide (LINZESS) 290 MCG PO capsule   7. Essential hypertension, benign I10 Basic metabolic panel     metoprolol succinate ER 25 MG PO 24 hr tablet   8. Dry eye syndrome of both eyes H04.123 polyethylene glycol-propylene glycol (SYSTANE) 0.4-0.3 % OP SOLN ophthalmic solution        BMI:   Estimated body mass index is 27.82 kg/m  as calculated from the following:    Height as of 6/25/19: 1.61 m (5' 3.39\").    Weight as of this encounter: 72.1 kg (159 lb).           Patient Instructions      01/01/1990  ZOSTER IMMUNIZATION (1 of 2)     09/01/2007  PNEUMOCOCCAL IMMUNIZATION 65+ LOW/MEDIUM RISK (2 of 2 - PCV13)     03/12/2015  MEDICARE ANNUAL WELLNESS VISIT     09/01/2016  DTAP/TDAP/TD IMMUNIZATION (2 - Td)     04/13/2017  LIPID     08/13/2019  ANA LAURA ASSESSMENT     09/01/2019  INFLUENZA VACCINE (1)     02/19/2020  FALL RISK ASSESSMENT      History of multiple falls   Not anxious   WELL ADULT EXAM  (M25.561,  M25.562,  G89.29) Chronic pain of both knees  Comment:    Plan: acetaminophen 500 MG PO tablet,         glucosamine-chondroitinoitin 500-400 MG PO         tablet             (M81.0) Age-related osteoporosis without current pathological fracture  Comment:    Plan: alendronate 35 MG PO tablet             (E78.5) Hyperlipidemia LDL goal <160  Comment:    Plan: Lipid panel reflex to direct LDL Fasting, ALT,         atorvastatin 10 MG PO tablet             (G44.219) Episodic tension-type headache, not intractable  Comment:    Plan: metoprolol succinate ER 25 MG PO 24 hr tablet             (E55.9) Vitamin D insufficiency  Comment:    Plan: Cholecalciferol (VITAMIN D3) 50 MCG (2000 UT)         PO TABS             (K59.04) Chronic idiopathic " constipation  Comment:    Plan: linaclotide (LINZESS) 290 MCG PO capsule             (I10) Essential hypertension, benign  Comment:    Plan: Basic metabolic panel, metoprolol succinate ER         25 MG PO 24 hr tablet             (H04.123) Dry eye syndrome of both eyes  Comment:    Plan: polyethylene glycol-propylene glycol (SYSTANE)         0.4-0.3 % OP SOLN ophthalmic solution                        Return in about 4 months (around 6/20/2020) for TRIP TO [place] .    GAYLA VEGA MD  [facility]

## 2020-02-20 NOTE — PATIENT INSTRUCTIONS
01/01/1990  ZOSTER IMMUNIZATION (1 of 2)     09/01/2007  PNEUMOCOCCAL IMMUNIZATION 65+ LOW/MEDIUM RISK (2 of 2 - PCV13)     03/12/2015  MEDICARE ANNUAL WELLNESS VISIT     09/01/2016  DTAP/TDAP/TD IMMUNIZATION (2 - Td)     04/13/2017  LIPID     08/13/2019  ANA LAURA ASSESSMENT     09/01/2019  INFLUENZA VACCINE (1)     02/19/2020  FALL RISK ASSESSMENT      History of multiple falls   Not anxious   WELL ADULT EXAM  (M25.561,  M25.562,  G89.29) Chronic pain of both knees  Comment:    Plan: acetaminophen 500 MG PO tablet,         glucosamine-chondroitinoitin 500-400 MG PO         tablet             (M81.0) Age-related osteoporosis without current pathological fracture  Comment:    Plan: alendronate 35 MG PO tablet             (E78.5) Hyperlipidemia LDL goal <160  Comment:    Plan: Lipid panel reflex to direct LDL Fasting, ALT,         atorvastatin 10 MG PO tablet             (G44.219) Episodic tension-type headache, not intractable  Comment:    Plan: metoprolol succinate ER 25 MG PO 24 hr tablet             (E55.9) Vitamin D insufficiency  Comment:    Plan: Cholecalciferol (VITAMIN D3) 50 MCG (2000 UT)         PO TABS             (K59.04) Chronic idiopathic constipation  Comment:    Plan: linaclotide (LINZESS) 290 MCG PO capsule             (I10) Essential hypertension, benign  Comment:    Plan: Basic metabolic panel, metoprolol succinate ER         25 MG PO 24 hr tablet             (H04.123) Dry eye syndrome of both eyes  Comment:    Plan: polyethylene glycol-propylene glycol (SYSTANE)         0.4-0.3 % OP SOLN ophthalmic solution

## 2020-02-20 NOTE — LETTER
"February 21, 2020      Rody Gonzalez  [address]   [facility] [address]        Dear ,    We are writing to inform you of your test results.    NORMAL LIVER FUNCTION TEST   NORMAL TOTAL CHOLESTEROL AND TRIGLYCERIDES   BORDERLINE  LOW HDL OR \"GOOD\" CHOLESTEROL   HIGH LDL OR \"BAD\" CHOLESTEROL AND VERY LOW DENSITY CHOLESTEROL   NORMAL GLUCOSE, RENAL AND BLOOD SALTS    CHLORIDE LEVEL NOT SIGNIFICANT       Resulted Orders   Lipid panel reflex to direct LDL Fasting   Result Value Ref Range    Cholesterol 196 <200 mg/dL    Triglycerides 76 <150 mg/dL    HDL Cholesterol 45 (L) >49 mg/dL    LDL Cholesterol Calculated 136 (H) <100 mg/dL      Comment:      Above desirable:  100-129 mg/dl  Borderline High:  130-159 mg/dL  High:             160-189 mg/dL  Very high:       >189 mg/dl      Non HDL Cholesterol 151 (H) <130 mg/dL      Comment:      Above Desirable:  130-159 mg/dl  Borderline high:  160-189 mg/dl  High:             190-219 mg/dl  Very high:       >219 mg/dl     ALT   Result Value Ref Range    ALT 25 0 - 50 U/L   Basic metabolic panel   Result Value Ref Range    Sodium 140 133 - 144 mmol/L    Potassium 3.8 3.4 - 5.3 mmol/L    Chloride 110 (H) 94 - 109 mmol/L    Carbon Dioxide 25 20 - 32 mmol/L    Anion Gap 5 3 - 14 mmol/L    Glucose 81 70 - 99 mg/dL    Urea Nitrogen 16 7 - 30 mg/dL    Creatinine 0.67 0.52 - 1.04 mg/dL    GFR Estimate 83 >60 mL/min/[1.73_m2]      Comment:      Non  GFR Calc  Starting 12/18/2018, serum creatinine based estimated GFR (eGFR) will be   calculated using the Chronic Kidney Disease Epidemiology Collaboration   (CKD-EPI) equation.      GFR Estimate If Black >90 >60 mL/min/[1.73_m2]      Comment:       GFR Calc  Starting 12/18/2018, serum creatinine based estimated GFR (eGFR) will be   calculated using the Chronic Kidney Disease Epidemiology Collaboration   (CKD-EPI) equation.      Calcium 10.2 (H) 8.5 - 10.1 mg/dL       If you have " any questions or concerns, please call the clinic at the number listed above.       Sincerely,        GAYLA VEGA MD

## 2020-04-20 ENCOUNTER — TRANSFERRED RECORDS (OUTPATIENT)
Dept: HEALTH INFORMATION MANAGEMENT | Facility: CLINIC | Age: 80
End: 2020-04-20

## 2020-05-26 DIAGNOSIS — E78.5 HYPERLIPIDEMIA LDL GOAL <160: Chronic | ICD-10-CM

## 2020-05-27 RX ORDER — ATORVASTATIN CALCIUM 10 MG/1
TABLET, FILM COATED ORAL
Qty: 90 TABLET | Refills: 2 | Status: SHIPPED | OUTPATIENT
Start: 2020-05-27 | End: 2020-11-05

## 2020-06-02 ENCOUNTER — PATIENT OUTREACH (OUTPATIENT)
Dept: GERIATRIC MEDICINE | Facility: CLINIC | Age: 80
End: 2020-06-02

## 2020-06-02 NOTE — PROGRESS NOTES
Placed call to Rody to inform her of [facility] closure on 6/30/20 and PCP's upcoming long-term. Phone number is not in service.    Spoke w/ daughter Eve and informed her of the above information. She will call if assistance is needed with finding a new clinic and/or PCP.    Ruthie Ace, [facility]  [phone]  Fax: [phone]

## 2020-08-07 ENCOUNTER — PATIENT OUTREACH (OUTPATIENT)
Dept: GERIATRIC MEDICINE | Facility: CLINIC | Age: 80
End: 2020-08-07

## 2020-08-07 NOTE — PROGRESS NOTES
[facility] Care Coordination Contact      [facility] Six-Month Telephone Assessment    6 month telephone assessment completed on 08/07/2020.    ER visits: No  Hospitalizations: No  TCU stays: No  Significant health status changes: n/a  Falls/Injuries: No  ADL/IADL changes: No  Changes in services: No    Caregiver Assessment follow up:  n/a    Goals: See POC in chart for goal progress documentation.     Member and member's daughter, Eve are considering [facility] as new clinic due to [facility] closure but will call CC back to confirm.    Will see member in 6 months for an annual health risk assessment.   Encouraged member to call CC with any questions or concerns in the meantime.     Johnna Malin RN BSN PHN      [facility]  Care Coordinator  Phone:   [phone]  Fax:       [phone]

## 2020-11-05 ENCOUNTER — OFFICE VISIT (OUTPATIENT)
Dept: FAMILY MEDICINE | Facility: CLINIC | Age: 80
End: 2020-11-05
Payer: COMMERCIAL

## 2020-11-05 VITALS
HEIGHT: 66 IN | WEIGHT: 146.3 LBS | OXYGEN SATURATION: 98 % | RESPIRATION RATE: 14 BRPM | SYSTOLIC BLOOD PRESSURE: 178 MMHG | DIASTOLIC BLOOD PRESSURE: 88 MMHG | BODY MASS INDEX: 23.51 KG/M2 | HEART RATE: 54 BPM

## 2020-11-05 DIAGNOSIS — I10 ESSENTIAL HYPERTENSION, BENIGN: ICD-10-CM

## 2020-11-05 DIAGNOSIS — E78.5 HYPERLIPIDEMIA LDL GOAL <160: Chronic | ICD-10-CM

## 2020-11-05 DIAGNOSIS — K21.00 GASTROESOPHAGEAL REFLUX DISEASE WITH ESOPHAGITIS WITHOUT HEMORRHAGE: ICD-10-CM

## 2020-11-05 DIAGNOSIS — M81.0 AGE-RELATED OSTEOPOROSIS WITHOUT CURRENT PATHOLOGICAL FRACTURE: ICD-10-CM

## 2020-11-05 DIAGNOSIS — E55.9 VITAMIN D INSUFFICIENCY: ICD-10-CM

## 2020-11-05 DIAGNOSIS — H04.123 DRY EYE SYNDROME OF BOTH EYES: ICD-10-CM

## 2020-11-05 DIAGNOSIS — F32.5 MAJOR DEPRESSION IN COMPLETE REMISSION (H): ICD-10-CM

## 2020-11-05 DIAGNOSIS — G44.219 EPISODIC TENSION-TYPE HEADACHE, NOT INTRACTABLE: Primary | ICD-10-CM

## 2020-11-05 LAB
ALBUMIN SERPL-MCNC: 3.5 G/DL (ref 3.4–5)
ALP SERPL-CCNC: 83 U/L (ref 40–150)
ALT SERPL W P-5'-P-CCNC: 21 U/L (ref 0–50)
ANION GAP SERPL CALCULATED.3IONS-SCNC: 3 MMOL/L (ref 3–14)
AST SERPL W P-5'-P-CCNC: 23 U/L (ref 0–45)
BILIRUB SERPL-MCNC: 0.4 MG/DL (ref 0.2–1.3)
BUN SERPL-MCNC: 11 MG/DL (ref 7–30)
CALCIUM SERPL-MCNC: 10.8 MG/DL (ref 8.5–10.1)
CHLORIDE SERPL-SCNC: 108 MMOL/L (ref 94–109)
CHOLEST SERPL-MCNC: 203 MG/DL
CO2 SERPL-SCNC: 27 MMOL/L (ref 20–32)
CREAT SERPL-MCNC: 0.66 MG/DL (ref 0.52–1.04)
DEPRECATED CALCIDIOL+CALCIFEROL SERPL-MC: 32 UG/L (ref 20–75)
ERYTHROCYTE [DISTWIDTH] IN BLOOD BY AUTOMATED COUNT: 13.4 % (ref 10–15)
GFR SERPL CREATININE-BSD FRML MDRD: 83 ML/MIN/{1.73_M2}
GLUCOSE SERPL-MCNC: 87 MG/DL (ref 70–99)
HCT VFR BLD AUTO: 37.8 % (ref 35–47)
HDLC SERPL-MCNC: 42 MG/DL
HGB BLD-MCNC: 12.5 G/DL (ref 11.7–15.7)
LDLC SERPL CALC-MCNC: 136 MG/DL
MCH RBC QN AUTO: 29.6 PG (ref 26.5–33)
MCHC RBC AUTO-ENTMCNC: 33.1 G/DL (ref 31.5–36.5)
MCV RBC AUTO: 89 FL (ref 78–100)
NONHDLC SERPL-MCNC: 161 MG/DL
PLATELET # BLD AUTO: 170 10E9/L (ref 150–450)
POTASSIUM SERPL-SCNC: 3.9 MMOL/L (ref 3.4–5.3)
PROT SERPL-MCNC: 7.8 G/DL (ref 6.8–8.8)
RBC # BLD AUTO: 4.23 10E12/L (ref 3.8–5.2)
SODIUM SERPL-SCNC: 138 MMOL/L (ref 133–144)
TRIGL SERPL-MCNC: 126 MG/DL
TSH SERPL DL<=0.005 MIU/L-ACNC: 2.05 MU/L (ref 0.4–4)
WBC # BLD AUTO: 5.3 10E9/L (ref 4–11)

## 2020-11-05 PROCEDURE — 82306 VITAMIN D 25 HYDROXY: CPT | Performed by: FAMILY MEDICINE

## 2020-11-05 PROCEDURE — 80061 LIPID PANEL: CPT | Performed by: FAMILY MEDICINE

## 2020-11-05 PROCEDURE — 99214 OFFICE O/P EST MOD 30 MIN: CPT | Performed by: FAMILY MEDICINE

## 2020-11-05 PROCEDURE — 80053 COMPREHEN METABOLIC PANEL: CPT | Performed by: FAMILY MEDICINE

## 2020-11-05 PROCEDURE — 36415 COLL VENOUS BLD VENIPUNCTURE: CPT | Performed by: FAMILY MEDICINE

## 2020-11-05 PROCEDURE — 85027 COMPLETE CBC AUTOMATED: CPT | Performed by: FAMILY MEDICINE

## 2020-11-05 PROCEDURE — 84443 ASSAY THYROID STIM HORMONE: CPT | Performed by: FAMILY MEDICINE

## 2020-11-05 RX ORDER — ATORVASTATIN CALCIUM 10 MG/1
10 TABLET, FILM COATED ORAL DAILY
Qty: 90 TABLET | Refills: 2 | Status: SHIPPED | OUTPATIENT
Start: 2020-11-05 | End: 2020-11-25

## 2020-11-05 RX ORDER — TRIAMTERENE AND HYDROCHLOROTHIAZIDE 75; 50 MG/1; MG/1
1 TABLET ORAL DAILY
Qty: 90 TABLET | Refills: 0 | Status: SHIPPED | OUTPATIENT
Start: 2020-11-05 | End: 2021-02-01

## 2020-11-05 RX ORDER — POLYETHYLENE GLYCOL 400 AND PROPYLENE GLYCOL 4; 3 MG/ML; MG/ML
1 SOLUTION/ DROPS OPHTHALMIC
Qty: 45 ML | Refills: 0 | Status: SHIPPED | OUTPATIENT
Start: 2020-11-05

## 2020-11-05 RX ORDER — ALENDRONATE SODIUM 70 MG/1
70 TABLET ORAL
Qty: 12 TABLET | Refills: 0 | Status: SHIPPED | OUTPATIENT
Start: 2020-11-05 | End: 2021-01-25

## 2020-11-05 RX ORDER — ESOMEPRAZOLE MAGNESIUM 40 MG/1
40 CAPSULE, DELAYED RELEASE ORAL
Qty: 90 CAPSULE | Refills: 0 | Status: SHIPPED | OUTPATIENT
Start: 2020-11-05 | End: 2021-04-29

## 2020-11-05 RX ORDER — CHOLECALCIFEROL (VITAMIN D3) 50 MCG
2 TABLET ORAL
Qty: 180 TABLET | Refills: 0 | Status: SHIPPED | OUTPATIENT
Start: 2020-11-05

## 2020-11-05 ASSESSMENT — PATIENT HEALTH QUESTIONNAIRE - PHQ9
SUM OF ALL RESPONSES TO PHQ QUESTIONS 1-9: 0
5. POOR APPETITE OR OVEREATING: NOT AT ALL

## 2020-11-05 ASSESSMENT — ANXIETY QUESTIONNAIRES
7. FEELING AFRAID AS IF SOMETHING AWFUL MIGHT HAPPEN: NOT AT ALL
2. NOT BEING ABLE TO STOP OR CONTROL WORRYING: NOT AT ALL
1. FEELING NERVOUS, ANXIOUS, OR ON EDGE: NOT AT ALL
3. WORRYING TOO MUCH ABOUT DIFFERENT THINGS: NOT AT ALL
6. BECOMING EASILY ANNOYED OR IRRITABLE: NOT AT ALL
GAD7 TOTAL SCORE: 0
5. BEING SO RESTLESS THAT IT IS HARD TO SIT STILL: NOT AT ALL

## 2020-11-05 ASSESSMENT — PAIN SCALES - GENERAL: PAINLEVEL: NO PAIN (0)

## 2020-11-05 ASSESSMENT — MIFFLIN-ST. JEOR: SCORE: 1150.36

## 2020-11-05 NOTE — PROGRESS NOTES
"Subjective     Rody Gonzalez is a 80 year old female who presents to clinic today for the following health issues:    HPI         New Patient/Transfer of Care/FORMER PATIENT OF DR VEGA [facility]   Headache  Onset/Duration: 2 months it is becoming more frequent. She has a long hx of tension headaches.   Description  Location: bilateral in the frontal area   Character: throbbing pain, sharp pain  Frequency:  Occasionally.   Duration:  1-2 months.   Wake with headaches: YES  Able to do daily activities when headache present: YES  Intensity:  mild, moderate  Progression of Symptoms:  same  Accompanying signs and symptoms:  Stiff neck: YES  Neck or upper back pain: no  Sinus or URI symptoms no   Fever: no  Nausea or vomiting: no  Dizziness: no  Numbness/tingling: no  Weakness: no  Visual changes: flashing lights  History  Head trauma: no  Family history of migraines: no  Daily pain medication use: no  Previous tests for headaches: no  Neurologist evaluation: no  Precipitating or Alleviating factors (light/sound/sleep/caffeine)  Therapies tried and outcome: none             Outcome - none  Frequent/daily pain medication use: previous dr used to give med for migraine    PHQ 11/7/2019 1/30/2020 11/5/2020   PHQ-9 Total Score 0 6 0   Q9: Thoughts of better off dead/self-harm past 2 weeks Not at all Not at all Not at all     Patient has not been taking her antihypertensive medications consistently.     Review of Systems   Constitutional, HEENT, cardiovascular, pulmonary, gi and gu systems are negative, except as otherwise noted.      Objective    BP (!) 178/88 (BP Location: Left arm, Patient Position: Sitting, Cuff Size: Adult Regular)   Pulse 54   Resp 14   Ht 1.676 m (5' 6\")   Wt 66.4 kg (146 lb 4.8 oz)   LMP  (LMP Unknown)   SpO2 98%   BMI 23.61 kg/m    Body mass index is 23.61 kg/m .  Physical Exam   GENERAL: healthy, alert and no distress  NECK: no adenopathy, no asymmetry, masses, or scars and thyroid " normal to palpation  RESP: lungs clear to auscultation - no rales, rhonchi or wheezes  CV: regular rate and rhythm, normal S1 S2, no S3 or S4, no murmur, click or rub, no peripheral edema and peripheral pulses strong  MS: no gross musculoskeletal defects noted, no edema    Results for orders placed or performed in visit on 11/05/20   CBC with platelets     Status: None   Result Value Ref Range    WBC 5.3 4.0 - 11.0 10e9/L    RBC Count 4.23 3.8 - 5.2 10e12/L    Hemoglobin 12.5 11.7 - 15.7 g/dL    Hematocrit 37.8 35.0 - 47.0 %    MCV 89 78 - 100 fl    MCH 29.6 26.5 - 33.0 pg    MCHC 33.1 31.5 - 36.5 g/dL    RDW 13.4 10.0 - 15.0 %    Platelet Count 170 150 - 450 10e9/L           Assessment & Plan     Episodic tension-type headache, not intractable  History and clinical exam are consistent with tension headaches. It is also likely related to poorly controlled blood pressure.   - aspirin-acetaminophen-caffeine (EXCEDRIN MIGRAINE) 250-250-65 MG tablet; Take 1 tablet by mouth every 6 hours as needed for headaches    Essential hypertension, benign  - I discontinued metoprolol because of low pulse today of 54 without taking any medications.   - triamterene-HCTZ (MAXZIDE) 75-50 MG tablet; Take 1 tablet by mouth daily  - Home Blood Pressure Monitor Order for DME - ONLY FOR DME  - Lipid panel reflex to direct LDL Fasting  - TSH with free T4 reflex  - CBC with platelets  - **Comprehensive metabolic panel FUTURE anytime    Gastroesophageal reflux disease with esophagitis without hemorrhage  - esomeprazole (NEXIUM) 40 MG DR capsule; Take 1 capsule (40 mg) by mouth every morning (before breakfast) Take 30-60 minutes before eating.    Major depression in complete remission (H)  Not on any medications.   PHQ-9 is zero    Age-related osteoporosis without current pathological fracture  - alendronate (FOSAMAX) 70 MG tablet; Take 1 tablet (70 mg) by mouth every 7 days  - calcium carbonate-vitamin D (OS-ODALIS) 600-400 MG-UNIT chewable  tablet; Take 1 chew tab by mouth 2 times daily (with meals)    Hyperlipidemia LDL goal <160  Refilled medications today.   Patient is due for a repeat lipid panel.   - atorvastatin (LIPITOR) 10 MG tablet; Take 1 tablet (10 mg) by mouth daily    Vitamin D insufficiency  - Cholecalciferol (VITAMIN D3) 50 MCG (2000 UT) TABS; Take 2 tablets by mouth daily (with breakfast)  - Vitamin D Deficiency    Dry eye syndrome of both eyes  - polyethylene glycol-propylene glycol (SYSTANE) 0.4-0.3 % SOLN ophthalmic solution; Place 1 drop into both eyes every hour as needed for dry eyes      Return in about 2 weeks (around 11/19/2020).    Piero Palacios MD  [facility]

## 2020-11-05 NOTE — LETTER
November 13, 2020      Rody Gonzalez  [address]   [facility] [address]        Dear ,    We are writing to inform you of your test results.    {results letter list:[id number]}    Resulted Orders   Lipid panel reflex to direct LDL Fasting   Result Value Ref Range    Cholesterol 203 (H) <200 mg/dL      Comment:      Desirable:       <200 mg/dl    Triglycerides 126 <150 mg/dL      Comment:      Fasting specimen    HDL Cholesterol 42 (L) >49 mg/dL    LDL Cholesterol Calculated 136 (H) <100 mg/dL      Comment:      Above desirable:  100-129 mg/dl  Borderline High:  130-159 mg/dL  High:             160-189 mg/dL  Very high:       >189 mg/dl      Non HDL Cholesterol 161 (H) <130 mg/dL      Comment:      Above Desirable:  130-159 mg/dl  Borderline high:  160-189 mg/dl  High:             190-219 mg/dl  Very high:       >219 mg/dl     TSH with free T4 reflex   Result Value Ref Range    TSH 2.05 0.40 - 4.00 mU/L   CBC with platelets   Result Value Ref Range    WBC 5.3 4.0 - 11.0 10e9/L    RBC Count 4.23 3.8 - 5.2 10e12/L    Hemoglobin 12.5 11.7 - 15.7 g/dL    Hematocrit 37.8 35.0 - 47.0 %    MCV 89 78 - 100 fl    MCH 29.6 26.5 - 33.0 pg    MCHC 33.1 31.5 - 36.5 g/dL    RDW 13.4 10.0 - 15.0 %    Platelet Count 170 150 - 450 10e9/L   **Comprehensive metabolic panel FUTURE anytime   Result Value Ref Range    Sodium 138 133 - 144 mmol/L    Potassium 3.9 3.4 - 5.3 mmol/L    Chloride 108 94 - 109 mmol/L    Carbon Dioxide 27 20 - 32 mmol/L    Anion Gap 3 3 - 14 mmol/L    Glucose 87 70 - 99 mg/dL      Comment:      Fasting specimen    Urea Nitrogen 11 7 - 30 mg/dL    Creatinine 0.66 0.52 - 1.04 mg/dL    GFR Estimate 83 >60 mL/min/[1.73_m2]      Comment:      Non  GFR Calc  Starting 12/18/2018, serum creatinine based estimated GFR (eGFR) will be   calculated using the Chronic Kidney Disease Epidemiology Collaboration   (CKD-EPI) equation.      GFR Estimate If Black >90 >60 mL/min/[1.73_m2]       Comment:       GFR Calc  Starting 12/18/2018, serum creatinine based estimated GFR (eGFR) will be   calculated using the Chronic Kidney Disease Epidemiology Collaboration   (CKD-EPI) equation.      Calcium 10.8 (H) 8.5 - 10.1 mg/dL    Bilirubin Total 0.4 0.2 - 1.3 mg/dL    Albumin 3.5 3.4 - 5.0 g/dL    Protein Total 7.8 6.8 - 8.8 g/dL    Alkaline Phosphatase 83 40 - 150 U/L    ALT 21 0 - 50 U/L    AST 23 0 - 45 U/L   Vitamin D Deficiency   Result Value Ref Range    Vitamin D Deficiency screening 32 20 - 75 ug/L      Comment:      Season, race, dietary intake, and treatment affect the concentration of   25-hydroxy-Vitamin D. Values may decrease during winter months and increase   during summer months. Values 20-29 ug/L may indicate Vitamin D insufficiency   and values <20 ug/L may indicate Vitamin D deficiency.  Vitamin D determination is routinely performed by an immunoassay specific for   25 hydroxyvitamin D3.  If an individual is on vitamin D2 (ergocalciferol)   supplementation, please specify 25 OH vitamin D2 and D3 level determination by   LCMSMS test [id number].         If you have any questions or concerns, please call the clinic at the number listed above.       Sincerely,        Piero Palacios MD

## 2020-11-05 NOTE — LETTER
November 13, 2020      Rody Gonzalez  [address]   [facility] [address]        Dear ,    We are writing to inform you of your test results.    {results letter list:425053}    Resulted Orders   Lipid panel reflex to direct LDL Fasting   Result Value Ref Range    Cholesterol 203 (H) <200 mg/dL      Comment:      Desirable:       <200 mg/dl    Triglycerides 126 <150 mg/dL      Comment:      Fasting specimen    HDL Cholesterol 42 (L) >49 mg/dL    LDL Cholesterol Calculated 136 (H) <100 mg/dL      Comment:      Above desirable:  100-129 mg/dl  Borderline High:  130-159 mg/dL  High:             160-189 mg/dL  Very high:       >189 mg/dl      Non HDL Cholesterol 161 (H) <130 mg/dL      Comment:      Above Desirable:  130-159 mg/dl  Borderline high:  160-189 mg/dl  High:             190-219 mg/dl  Very high:       >219 mg/dl     TSH with free T4 reflex   Result Value Ref Range    TSH 2.05 0.40 - 4.00 mU/L   CBC with platelets   Result Value Ref Range    WBC 5.3 4.0 - 11.0 10e9/L    RBC Count 4.23 3.8 - 5.2 10e12/L    Hemoglobin 12.5 11.7 - 15.7 g/dL    Hematocrit 37.8 35.0 - 47.0 %    MCV 89 78 - 100 fl    MCH 29.6 26.5 - 33.0 pg    MCHC 33.1 31.5 - 36.5 g/dL    RDW 13.4 10.0 - 15.0 %    Platelet Count 170 150 - 450 10e9/L   **Comprehensive metabolic panel FUTURE anytime   Result Value Ref Range    Sodium 138 133 - 144 mmol/L    Potassium 3.9 3.4 - 5.3 mmol/L    Chloride 108 94 - 109 mmol/L    Carbon Dioxide 27 20 - 32 mmol/L    Anion Gap 3 3 - 14 mmol/L    Glucose 87 70 - 99 mg/dL      Comment:      Fasting specimen    Urea Nitrogen 11 7 - 30 mg/dL    Creatinine 0.66 0.52 - 1.04 mg/dL    GFR Estimate 83 >60 mL/min/[1.73_m2]      Comment:      Non  GFR Calc  Starting 12/18/2018, serum creatinine based estimated GFR (eGFR) will be   calculated using the Chronic Kidney Disease Epidemiology Collaboration   (CKD-EPI) equation.      GFR Estimate If Black >90 >60 mL/min/[1.73_m2]       Comment:       GFR Calc  Starting 12/18/2018, serum creatinine based estimated GFR (eGFR) will be   calculated using the Chronic Kidney Disease Epidemiology Collaboration   (CKD-EPI) equation.      Calcium 10.8 (H) 8.5 - 10.1 mg/dL    Bilirubin Total 0.4 0.2 - 1.3 mg/dL    Albumin 3.5 3.4 - 5.0 g/dL    Protein Total 7.8 6.8 - 8.8 g/dL    Alkaline Phosphatase 83 40 - 150 U/L    ALT 21 0 - 50 U/L    AST 23 0 - 45 U/L   Vitamin D Deficiency   Result Value Ref Range    Vitamin D Deficiency screening 32 20 - 75 ug/L      Comment:      Season, race, dietary intake, and treatment affect the concentration of   25-hydroxy-Vitamin D. Values may decrease during winter months and increase   during summer months. Values 20-29 ug/L may indicate Vitamin D insufficiency   and values <20 ug/L may indicate Vitamin D deficiency.  Vitamin D determination is routinely performed by an immunoassay specific for   25 hydroxyvitamin D3.  If an individual is on vitamin D2 (ergocalciferol)   supplementation, please specify 25 OH vitamin D2 and D3 level determination by   LCMSMS test [id number].         If you have any questions or concerns, please call the clinic at the number listed above.       Sincerely,        Piero Palacios MD

## 2020-11-06 ASSESSMENT — ANXIETY QUESTIONNAIRES: GAD7 TOTAL SCORE: 0

## 2020-11-25 ENCOUNTER — OFFICE VISIT (OUTPATIENT)
Dept: FAMILY MEDICINE | Facility: CLINIC | Age: 80
End: 2020-11-25
Payer: COMMERCIAL

## 2020-11-25 VITALS
RESPIRATION RATE: 18 BRPM | HEIGHT: 66 IN | OXYGEN SATURATION: 100 % | SYSTOLIC BLOOD PRESSURE: 132 MMHG | HEART RATE: 57 BPM | TEMPERATURE: 98 F | WEIGHT: 141.3 LBS | BODY MASS INDEX: 22.71 KG/M2 | DIASTOLIC BLOOD PRESSURE: 73 MMHG

## 2020-11-25 DIAGNOSIS — E78.5 HYPERLIPIDEMIA LDL GOAL <160: Chronic | ICD-10-CM

## 2020-11-25 DIAGNOSIS — I10 ESSENTIAL HYPERTENSION, BENIGN: ICD-10-CM

## 2020-11-25 DIAGNOSIS — K21.9 GASTROESOPHAGEAL REFLUX DISEASE WITHOUT ESOPHAGITIS: Primary | Chronic | ICD-10-CM

## 2020-11-25 PROCEDURE — 99214 OFFICE O/P EST MOD 30 MIN: CPT | Performed by: FAMILY MEDICINE

## 2020-11-25 RX ORDER — ATORVASTATIN CALCIUM 10 MG/1
10 TABLET, FILM COATED ORAL DAILY
Qty: 90 TABLET | Refills: 2 | Status: SHIPPED | OUTPATIENT
Start: 2020-11-25

## 2020-11-25 RX ORDER — ATORVASTATIN CALCIUM 10 MG/1
10 TABLET, FILM COATED ORAL DAILY
Qty: 90 TABLET | Refills: 2 | Status: SHIPPED | OUTPATIENT
Start: 2020-11-25 | End: 2020-11-25

## 2020-11-25 ASSESSMENT — MIFFLIN-ST. JEOR: SCORE: 1127.68

## 2020-11-25 NOTE — PROGRESS NOTES
"Subjective     Rody Gonzalez is a 80 year old female who presents to clinic today for the following health issues:    HPI         Hypertension Follow-up      Do you check your blood pressure regularly outside of the clinic? Yes     Are you following a low salt diet? Yes    Are your blood pressures ever more than 140 on the top number (systolic) OR more   than 90 on the bottom number (diastolic), for example 140/90? Yes       How many servings of fruits and vegetables do you eat daily?  0-1    On average, how many sweetened beverages do you drink each day (Examples: soda, juice, sweet tea, etc.  Do NOT count diet or artificially sweetened beverages)?   0    How many days per week do you exercise enough to make your heart beat faster? 3 or less    How many minutes a day do you exercise enough to make your heart beat faster? 9 or less    How many days per week do you miss taking your medication? 0    Pt requesting orders for a walker.      Review of Systems   Constitutional, HEENT, cardiovascular, pulmonary, gi and gu systems are negative, except as otherwise noted.      Objective    /73 (Patient Position: Sitting, Cuff Size: Adult Large)   Pulse 57   Temp 98  F (36.7  C) (Tympanic)   Resp 18   Ht 1.676 m (5' 6\")   Wt 64.1 kg (141 lb 4.8 oz)   LMP  (LMP Unknown)   SpO2 100%   BMI 22.81 kg/m    Body mass index is 22.81 kg/m .  Physical Exam   GENERAL: healthy, alert and no distress  NECK: no adenopathy, no asymmetry, masses, or scars and thyroid normal to palpation  RESP: lungs clear to auscultation - no rales, rhonchi or wheezes  CV: regular rate and rhythm, normal S1 S2, no S3 or S4, no murmur, click or rub, no peripheral edema and peripheral pulses strong  MS: no gross musculoskeletal defects noted, no edema    No results found for any visits on 11/25/20.        Assessment & Plan     Hyperlipidemia LDL goal <160  Risks and benefits were discussed. Desires to start a low dose cholesterol lowering " medications.    - Lipid panel reflex to direct LDL Fasting; Future    Gastroesophageal reflux disease without esophagitis  Continue with Nexium.     Essential hypertension, benign  Patient is taking Maxzide. Blood pressure is well controlled.         Return in about 3 months (around 2/25/2021) for Physical Exam.    Piero Palacios MD  [facility]

## 2020-12-10 ENCOUNTER — PATIENT OUTREACH (OUTPATIENT)
Dept: GERIATRIC MEDICINE | Facility: CLINIC | Age: 80
End: 2020-12-10

## 2020-12-10 SDOH — HEALTH STABILITY: PHYSICAL HEALTH: ON AVERAGE, HOW MANY MINUTES DO YOU ENGAGE IN EXERCISE AT THIS LEVEL?: 0 MIN

## 2020-12-10 SDOH — SOCIAL STABILITY: SOCIAL NETWORK: IN A TYPICAL WEEK, HOW MANY TIMES DO YOU TALK ON THE PHONE WITH FAMILY, FRIENDS, OR NEIGHBORS?: THREE TIMES A WEEK

## 2020-12-10 SDOH — ECONOMIC STABILITY: TRANSPORTATION INSECURITY
IN THE PAST 12 MONTHS, HAS THE LACK OF TRANSPORTATION KEPT YOU FROM MEDICAL APPOINTMENTS OR FROM GETTING MEDICATIONS?: NO

## 2020-12-10 SDOH — ECONOMIC STABILITY: INCOME INSECURITY: HOW HARD IS IT FOR YOU TO PAY FOR THE VERY BASICS LIKE FOOD, HOUSING, MEDICAL CARE, AND HEATING?: NOT VERY HARD

## 2020-12-10 SDOH — SOCIAL STABILITY: SOCIAL NETWORK: HOW OFTEN DO YOU ATTENT MEETINGS OF THE CLUB OR ORGANIZATION YOU BELONG TO?: NOT ASKED

## 2020-12-10 SDOH — ECONOMIC STABILITY: FOOD INSECURITY: WITHIN THE PAST 12 MONTHS, THE FOOD YOU BOUGHT JUST DIDN'T LAST AND YOU DIDN'T HAVE MONEY TO GET MORE.: NEVER TRUE

## 2020-12-10 SDOH — ECONOMIC STABILITY: FOOD INSECURITY: WITHIN THE PAST 12 MONTHS, YOU WORRIED THAT YOUR FOOD WOULD RUN OUT BEFORE YOU GOT MONEY TO BUY MORE.: NEVER TRUE

## 2020-12-10 SDOH — HEALTH STABILITY: MENTAL HEALTH
STRESS IS WHEN SOMEONE FEELS TENSE, NERVOUS, ANXIOUS, OR CAN'T SLEEP AT NIGHT BECAUSE THEIR MIND IS TROUBLED. HOW STRESSED ARE YOU?: ONLY A LITTLE

## 2020-12-10 SDOH — SOCIAL STABILITY: SOCIAL NETWORK: ARE YOU MARRIED, WIDOWED, DIVORCED, SEPARATED, NEVER MARRIED, OR LIVING WITH A PARTNER?: WIDOWED

## 2020-12-10 SDOH — SOCIAL STABILITY: SOCIAL NETWORK: HOW OFTEN DO YOU GET TOGETHER WITH FRIENDS OR RELATIVES?: THREE TIMES A WEEK

## 2020-12-10 SDOH — HEALTH STABILITY: PHYSICAL HEALTH: ON AVERAGE, HOW MANY DAYS PER WEEK DO YOU ENGAGE IN MODERATE TO STRENUOUS EXERCISE (LIKE A BRISK WALK)?: 0 DAYS

## 2020-12-10 SDOH — SOCIAL STABILITY: SOCIAL NETWORK
DO YOU BELONG TO ANY CLUBS OR ORGANIZATIONS SUCH AS CHURCH GROUPS UNIONS, FRATERNAL OR ATHLETIC GROUPS, OR SCHOOL GROUPS?: NO

## 2020-12-10 SDOH — SOCIAL STABILITY: SOCIAL INSECURITY: WITHIN THE LAST YEAR, HAVE YOU BEEN HUMILIATED OR EMOTIONALLY ABUSED IN OTHER WAYS BY YOUR PARTNER OR EX-PARTNER?: NO

## 2020-12-10 SDOH — SOCIAL STABILITY: SOCIAL NETWORK: HOW OFTEN DO YOU ATTEND CHURCH OR RELIGIOUS SERVICES?: NEVER

## 2020-12-10 SDOH — SOCIAL STABILITY: SOCIAL INSECURITY
WITHIN THE LAST YEAR, HAVE TO BEEN RAPED OR FORCED TO HAVE ANY KIND OF SEXUAL ACTIVITY BY YOUR PARTNER OR EX-PARTNER?: NO

## 2020-12-10 SDOH — SOCIAL STABILITY: SOCIAL INSECURITY: WITHIN THE LAST YEAR, HAVE YOU BEEN AFRAID OF YOUR PARTNER OR EX-PARTNER?: NO

## 2020-12-10 SDOH — ECONOMIC STABILITY: TRANSPORTATION INSECURITY
IN THE PAST 12 MONTHS, HAS LACK OF TRANSPORTATION KEPT YOU FROM MEETINGS, WORK, OR FROM GETTING THINGS NEEDED FOR DAILY LIVING?: NO

## 2020-12-10 SDOH — SOCIAL STABILITY: SOCIAL INSECURITY
WITHIN THE LAST YEAR, HAVE YOU BEEN KICKED, HIT, SLAPPED, OR OTHERWISE PHYSICALLY HURT BY YOUR PARTNER OR EX-PARTNER?: NO

## 2020-12-10 ASSESSMENT — PATIENT HEALTH QUESTIONNAIRE - PHQ9: SUM OF ALL RESPONSES TO PHQ QUESTIONS 1-9: 5

## 2020-12-10 ASSESSMENT — ACTIVITIES OF DAILY LIVING (ADL): DEPENDENT_IADLS:: CLEANING;LAUNDRY;COOKING;SHOPPING;MEAL PREPARATION;TRANSPORTATION;MEDICATION MANAGEMENT

## 2020-12-10 NOTE — PROGRESS NOTES
[facility] Care Coordination Contact    [facility] Home Visit Assessment     Telephonic visit for Health Risk Assessment with Rody Gonzalez completed on December 10, 2020    Type of residence:: Apartment  Current living arrangement:: I live alone     Assessment completed with:: Patient    Current Care Plan  Member currently receiving the following home care services:   N/A  Member currently receiving the following community resources: PCA, Day Care, Transportation Services, Housekeeping/Chore Agency       Medication Review  Medication reconciliation completed in Epic: Yes  Medication set-up & administration: Family/informal caregiver sets up daily.  Family caregiver administers medications.  Medication Risk Assessment Medication (1 or more, place referral to MTM): Lacks understanding of medication regimen and Recent falls within past year  MTM Referral Placed: No: declined at this time    Mental/Behavioral Health   Depression Screening:      PHQ-9 Total Score: 5    Mental health DX:: Yes(Anxiety)        ADL/IADL Dependencies:   Dependent ADLs:: Ambulation-cane, Bathing, Dressing, Transfers  Dependent IADLs:: Cleaning, Laundry, Cooking, Shopping, Meal Preparation, Transportation, Medication Management    [facility] sponsored benefits: Shared information re: Silver Sneakers/gym memberships, ASA, Calcium +D.    PCA Assessment completed at visit: Yes Annual PCA assessment indicated 14 units per day of PCA. This is the same as the previous assessment.     Elderly Waiver Eligibility: Yes-will open to EW    Care Plan & Recommendations:  Member denies any recent hospitalization or ED visit.  Member to continue current POC including PCA, homemaking, ADC and ADC transportation.  Member confirmed that she has changed clinics to [facility] after previous clinic closed.    See CC for detailed assessment information.    Follow-Up Plan: Member informed of future contact, plan to f/u with member  with a 6 month telephone assessment.  Contact information shared with member and family, encouraged member to call with any questions or concerns at any time.    [facility] continuum providers: Please refer to Health Care Home on the Epic Problem List to view this patient's [facility] Care Plan Summary.    Johnna Malin RN BSN PHN      [facility]  Care Coordinator  Phone:   [phone]  Fax:       [phone]

## 2020-12-18 ENCOUNTER — PATIENT OUTREACH (OUTPATIENT)
Dept: GERIATRIC MEDICINE | Facility: CLINIC | Age: 80
End: 2020-12-18

## 2020-12-18 NOTE — LETTER
December 18, 2020      SKYE MUNOZ  [address]   [facility] [address]      Dear Skye:    At [facility], we are dedicated to improving your health and well-being. Enclosed is the Comprehensive Care Plan that we developed with you on 12/10/2020. Please review the Care Plan carefully.    As a reminder, some of the things we discussed at your visit include:    Your physical and mental health    Ways to reduce falls    Health care needs you may have    Don t forget to contact your care coordinator if you:    Have been hospitalized or plan to be hospitalized     Have had a fall     Have experienced a change in physical health    Are experiencing emotional problems     If you do not agree with your Care Plan, have questions about it, or have experienced a change in your needs, please call me at [phone]. If you are hearing impaired, please call the [place] Relay at 602 or [phone] (quhudr-rj-tdfpey relay service).    Sincerely,    CANDICE Collins, RN, PHN    E-mail: [email or web address]  Phone: [phone]      [facility] ([facility]) is a health plan that contracts with both Medicare and the [place] Medical Assistance (Medicaid) program to provide benefits of both programs to enrollees. Enrollment in [facility] depends on contract renewal.    [id number])     [id number] (11/18)

## 2020-12-18 NOTE — PROGRESS NOTES
[facility] Care Coordination Contact    Received after visit chart from care coordinator.  Completed following tasks: Mailed copy of care plan to client, Updated services in access and Submitted referrals/auths for ADC, transportation and homemaking  , Provider Signature - No POC Shared:  Member indicates that they do not want their POC shared with any EW providers.    [facility]:  Emailed completed PCA assessment to [facility].  Faxed copy of PCA assessment to PCA Agency and mailed copy to member.  Faxed MD Communication to PCP.     **THIS ASSESSMENT WAS DONE OVER THE PHONE. SENT POC/PCA SIG PAGES TO MEMBER ASKING THEM TO SIGN AND RETURN IN SASE**    Nimisha Murillo  Care Management Specialist  [facility]  [phone]

## 2020-12-23 ENCOUNTER — PATIENT OUTREACH (OUTPATIENT)
Dept: GERIATRIC MEDICINE | Facility: CLINIC | Age: 80
End: 2020-12-23

## 2020-12-23 NOTE — PROGRESS NOTES
[facility] Care Coordination Contact    Placed a call to [facility] [place] to follow up on U-Code as not able to enter MMIS entry for recent annual assessment and was told team will be updated to remove U-Code, who will also call CC back.  Awaiting U-Code removal prior to MMIS entry.  Johnna Malin RN BSN PHN      [facility]  Care Coordinator  Phone:   [phone]  Fax:       [phone]

## 2021-01-25 DIAGNOSIS — M81.0 AGE-RELATED OSTEOPOROSIS WITHOUT CURRENT PATHOLOGICAL FRACTURE: ICD-10-CM

## 2021-01-25 RX ORDER — ALENDRONATE SODIUM 70 MG/1
70 TABLET ORAL
Qty: 12 TABLET | Refills: 0 | Status: SHIPPED | OUTPATIENT
Start: 2021-01-25 | End: 2021-04-21

## 2021-01-25 NOTE — TELEPHONE ENCOUNTER
Prescription approved per [facility] Refill Protocol.  Candis ELENA RN

## 2021-02-01 DIAGNOSIS — I10 ESSENTIAL HYPERTENSION, BENIGN: ICD-10-CM

## 2021-02-01 RX ORDER — TRIAMTERENE AND HYDROCHLOROTHIAZIDE 75; 50 MG/1; MG/1
1 TABLET ORAL DAILY
Qty: 30 TABLET | Refills: 0 | Status: SHIPPED | OUTPATIENT
Start: 2021-02-01

## 2021-02-01 NOTE — TELEPHONE ENCOUNTER
Medication is being filled for 1 time refill only due to:  Patient needs to be seen because due for physical.   Candis ELENA RN

## 2021-04-21 DIAGNOSIS — M81.0 AGE-RELATED OSTEOPOROSIS WITHOUT CURRENT PATHOLOGICAL FRACTURE: ICD-10-CM

## 2021-04-21 RX ORDER — ALENDRONATE SODIUM 70 MG/1
70 TABLET ORAL
Qty: 4 TABLET | Refills: 0 | Status: SHIPPED | OUTPATIENT
Start: 2021-04-21

## 2021-04-21 NOTE — TELEPHONE ENCOUNTER
Prescription approved per [facility] Refill Protocol.  1 month refill only; patient due for appointment (physical)  Candis ELENA RN

## 2021-04-29 DIAGNOSIS — K21.00 GASTROESOPHAGEAL REFLUX DISEASE WITH ESOPHAGITIS WITHOUT HEMORRHAGE: ICD-10-CM

## 2021-04-29 RX ORDER — ESOMEPRAZOLE MAGNESIUM 40 MG/1
40 CAPSULE, DELAYED RELEASE ORAL
Qty: 30 CAPSULE | Refills: 0 | Status: SHIPPED | OUTPATIENT
Start: 2021-04-29

## 2021-04-29 NOTE — TELEPHONE ENCOUNTER
Esomeprazole:    Medication is being filled for 1 time refill only due to:  Patient needs to be seen because Due for physical.     Halley Jeronimo RN

## 2021-05-21 ENCOUNTER — PATIENT OUTREACH (OUTPATIENT)
Dept: GERIATRIC MEDICINE | Facility: CLINIC | Age: 81
End: 2021-05-21

## 2021-05-21 NOTE — PROGRESS NOTES
[facility] Care Coordination Contact      [facility] Six-Month Telephone Assessment    6 month telephone assessment completed on 05/21/2021.    ER visits: No  Hospitalizations: No  TCU stays: No  Significant health status changes: n/a  Falls/Injuries: No  ADL/IADL changes: No  Changes in services: No    Caregiver Assessment follow up:  n/a    Goals: See POC in chart for goal progress documentation.     Discussed Covid-19 Vaccine and member stated she took vaccine at her building location.  Discussed taking vaccine card to clinic or faxing to CC so epic an be updated that member received vaccine.  However,  spoke to member's daughter, she stated that she is not sure if member received vaccine and will double check with member if she did actually get vaccine and call CC back.    Will see member in 6 months for an annual health risk assessment.   Encouraged member to call CC with any questions or concerns in the meantime.     Johnna Malin RN BSN PHN      [facility]  Care Coordinator  Phone:   [phone]  Fax:       [phone]

## 2021-09-10 NOTE — LETTER
November 12, 2020      Rody Gonzalez  [address]   [facility] [address]        Dear ,    We are writing to inform you of your test results.  Your cholesterol is mildly elevated.  At this point I would not recommend starting you cholesterol-lowering medication such as statins.  I would recommend dietary changes and increasing her physical activity.      Resulted Orders   Lipid panel reflex to direct LDL Fasting   Result Value Ref Range    Cholesterol 203 (H) <200 mg/dL      Comment:      Desirable:       <200 mg/dl    Triglycerides 126 <150 mg/dL      Comment:      Fasting specimen    HDL Cholesterol 42 (L) >49 mg/dL    LDL Cholesterol Calculated 136 (H) <100 mg/dL      Comment:      Above desirable:  100-129 mg/dl  Borderline High:  130-159 mg/dL  High:             160-189 mg/dL  Very high:       >189 mg/dl      Non HDL Cholesterol 161 (H) <130 mg/dL      Comment:      Above Desirable:  130-159 mg/dl  Borderline high:  160-189 mg/dl  High:             190-219 mg/dl  Very high:       >219 mg/dl     TSH with free T4 reflex   Result Value Ref Range    TSH 2.05 0.40 - 4.00 mU/L   CBC with platelets   Result Value Ref Range    WBC 5.3 4.0 - 11.0 10e9/L    RBC Count 4.23 3.8 - 5.2 10e12/L    Hemoglobin 12.5 11.7 - 15.7 g/dL    Hematocrit 37.8 35.0 - 47.0 %    MCV 89 78 - 100 fl    MCH 29.6 26.5 - 33.0 pg    MCHC 33.1 31.5 - 36.5 g/dL    RDW 13.4 10.0 - 15.0 %    Platelet Count 170 150 - 450 10e9/L   **Comprehensive metabolic panel FUTURE anytime   Result Value Ref Range    Sodium 138 133 - 144 mmol/L    Potassium 3.9 3.4 - 5.3 mmol/L    Chloride 108 94 - 109 mmol/L    Carbon Dioxide 27 20 - 32 mmol/L    Anion Gap 3 3 - 14 mmol/L    Glucose 87 70 - 99 mg/dL      Comment:      Fasting specimen    Urea Nitrogen 11 7 - 30 mg/dL    Creatinine 0.66 0.52 - 1.04 mg/dL    GFR Estimate 83 >60 mL/min/[1.73_m2]      Comment:      Non  GFR Calc  Starting 12/18/2018, serum creatinine based  estimated GFR (eGFR) will be   calculated using the Chronic Kidney Disease Epidemiology Collaboration   (CKD-EPI) equation.      GFR Estimate If Black >90 >60 mL/min/[1.73_m2]      Comment:       GFR Calc  Starting 12/18/2018, serum creatinine based estimated GFR (eGFR) will be   calculated using the Chronic Kidney Disease Epidemiology Collaboration   (CKD-EPI) equation.      Calcium 10.8 (H) 8.5 - 10.1 mg/dL    Bilirubin Total 0.4 0.2 - 1.3 mg/dL    Albumin 3.5 3.4 - 5.0 g/dL    Protein Total 7.8 6.8 - 8.8 g/dL    Alkaline Phosphatase 83 40 - 150 U/L    ALT 21 0 - 50 U/L    AST 23 0 - 45 U/L   Vitamin D Deficiency   Result Value Ref Range    Vitamin D Deficiency screening 32 20 - 75 ug/L      Comment:      Season, race, dietary intake, and treatment affect the concentration of   25-hydroxy-Vitamin D. Values may decrease during winter months and increase   during summer months. Values 20-29 ug/L may indicate Vitamin D insufficiency   and values <20 ug/L may indicate Vitamin D deficiency.  Vitamin D determination is routinely performed by an immunoassay specific for   25 hydroxyvitamin D3.  If an individual is on vitamin D2 (ergocalciferol)   supplementation, please specify 25 OH vitamin D2 and D3 level determination by   LCMSMS test [id number].         If you have any questions or concerns, please call the clinic at the number listed above.       Sincerely,        Piero Palacios MD                 No deformities present

## 2021-10-29 ENCOUNTER — TRANSFERRED RECORDS (OUTPATIENT)
Dept: HEALTH INFORMATION MANAGEMENT | Facility: CLINIC | Age: 81
End: 2021-10-29

## 2021-11-11 ENCOUNTER — PATIENT OUTREACH (OUTPATIENT)
Dept: GERIATRIC MEDICINE | Facility: CLINIC | Age: 81
End: 2021-11-11
Payer: COMMERCIAL

## 2021-11-11 ASSESSMENT — ACTIVITIES OF DAILY LIVING (ADL): DEPENDENT_IADLS:: CLEANING;LAUNDRY;COOKING;SHOPPING;MEAL PREPARATION;TRANSPORTATION;MEDICATION MANAGEMENT

## 2021-11-11 NOTE — PROGRESS NOTES
[facility] Care Coordination Contact    [facility] Home Visit Assessment     Telephone visit for Health Risk Assessment with Rody Gonzalez completed on November 11, 2021    Type of residence:: Apartment  Current living arrangement:: I live alone     Assessment completed with:: Patient    Current Care Plan  Member currently receiving the following home care services:   N/A  Member currently receiving the following community resources: PCA,Day Care,Transportation Services,Housekeeping/Chore Agency      Medication Review  Medication reconciliation completed in Epic: No  Medication set-up & administration: Family/informal caregiver sets up daily.  Family caregiver administers medications.  Medication Risk Assessment Medication (1 or more, place referral to MTM): Taking 1 or more high-risk medications for adults >65 years  MTM Referral Placed: No: Discussed/will review and follow up with CC    Mental/Behavioral Health   Depression Screening:      PHQ-9 Total Score: 7    Mental health DX:: Yes (Anxiety)        ADL/IADL Dependencies:   Dependent ADLs:: Ambulation-cane,Bathing,Dressing,Transfers  Dependent IADLs:: Cleaning,Laundry,Cooking,Shopping,Meal Preparation,Transportation,Medication Management    [facility] sponsored benefits: Shared information re: Silver Sneakers/gym memberships, ASA, Calcium +D.    PCA Assessment completed at visit: Yes Annual PCA assessment indicated 14 units per day of PCA. This is the same as the previous assessment.     Elderly Waiver Eligibility: Yes-will continue on EW    Care Plan & Recommendations: Discussed following up with PCP as member has not followed with PCP almost one year ago.  Member states she still is interested in maintaining [facility] as her clinic.  Member will continue to have PCA, homemaking, adult day care and adult day care transportation services.  Member is requested for CC to follow up with daughter regarding any paperwork or   updates.  Placed a call to member's daughter Eve and updated her.    See CC for detailed assessment information.    Follow-Up Plan: Member informed of future contact, plan to f/u with member with a 6 month telephone assessment.  Contact information shared with member and family, encouraged member to call with any questions or concerns at any time.    [facility] continuum providers: Please refer to Health Care Home on the Epic Problem List to view this patient's [facility] Care Plan Summary.    Johnna Malin RN BSN PHN      [facility]  Care Coordinator  Phone:   [phone]  Fax:       [phone]

## 2021-11-18 ENCOUNTER — PATIENT OUTREACH (OUTPATIENT)
Dept: GERIATRIC MEDICINE | Facility: CLINIC | Age: 81
End: 2021-11-18
Payer: COMMERCIAL

## 2021-11-18 NOTE — PROGRESS NOTES
[facility] Care Coordination Contact    Received after visit chart from care coordinator.  Completed following tasks: Mailed copy of care plan to client, Updated services in access and Submitted referrals/auths for ADC, transportation and homemaking  , Provider Signature - No POC Shared:  Member indicates that they do not want their POC shared with any EW providers.    [facility]:  Emailed completed PCA assessment to [facility].  Faxed copy of PCA assessment to PCA Agency and mailed copy to member.  Faxed MD Communication to PCP.     **THIS ASSESSMENT WAS DONE OVER THE PHONE, SENT POC/PCA SIG PAGES TO MEMBER ASKING THEM TO SIGN AND RETURN IN SASE**    Nimisha Murillo  Care Management Specialist  [facility]  [phone]

## 2021-11-18 NOTE — LETTER
November 18, 2021      SKYE MUNOZ  [address]   [facility] [address]      Dear Skye:    At [facility], we are dedicated to improving your health and well-being. Enclosed is the Comprehensive Care Plan that we developed with you on 11/11/2021. Please review the Care Plan carefully.    As a reminder, some of the things we discussed at your visit include:    Your physical and mental health    Ways to reduce falls    Health care needs you may have    Don t forget to contact your care coordinator if you:    Have been hospitalized or plan to be hospitalized     Have had a fall     Have experienced a change in physical health    Are experiencing emotional problems     If you do not agree with your Care Plan, have questions about it, or have experienced a change in your needs, please call me at [phone]. If you are hearing impaired, please call the [place] Relay at 758 or [phone] (svjjby-ba-ctnpax relay service).    Sincerely,    CANDICE Collins, RN, PHN    E-mail: [email or web address]  Phone: [phone]      [facility] ([facility]) is a health plan that contracts with both Medicare and the [place] Medical Assistance (Medicaid) program to provide benefits of both programs to enrollees. Enrollment in [facility] depends on contract renewal.    [id number])     [id number] (11/18)

## 2021-11-19 ASSESSMENT — PATIENT HEALTH QUESTIONNAIRE - PHQ9: SUM OF ALL RESPONSES TO PHQ QUESTIONS 1-9: 7

## 2021-12-10 NOTE — PROGRESS NOTES
Assessment & Plan     Essential hypertension, benign  Her blood pressure is elevated today, but she did not take her medication last night.  Upon further discussion it sounds like she skips her blood pressure medication fairly regularly.  I encouraged her to take it daily as prescribed.  Her blood pressure readings from home appear to be good as her systolic BP is typically running in the 120s-130s.  She should schedule follow-up with her PCP in the next month for blood pressure check.    Primary osteoarthritis of knees, bilateral  Her knee pain symptoms are due to osteoarthritis.  I reviewed the orthopedic consultation note from 11/5/2021 and let her know that she should consider the corticosteroid injections.  I explained that she could do these here at the [facility] if she would like.  I would also be happy to put in a new orthopedic referral if she would like.  She does not sound interested in seeing the same provider she saw [facility].    Major depression in complete remission (H)  This issue appears to be stable.                   Return in about 4 weeks (around 1/12/2022) for Blood Pressure Follow Up.    Azael Flores DO  [facility]    Ulices Fine is a 81 year old who presents for the following health issues     HPI     Medication Followup of triamterene-hydrochlorothiazide    Taking Medication as prescribed: She does not always take her BP meds. She did not take her meds last night or this morning.  Her family member who is with her today reports that some days she decides to skip the medication if she is feeling fine.    Side Effects:  None    Medication Helping Symptoms:  yes      Hypertension Follow-up      Do you check your blood pressure regularly outside of the clinic? Yes the blood pressure readings at home have been in the 120s-130s over 70s-80s.  Occasionally she has readings in the 140s.    Are you following a low salt diet? Yes    Are your blood pressures  ever more than 140 on the top number (systolic) OR more   than 90 on the bottom number (diastolic), for example 140/90? Yes      How many servings of fruits and vegetables do you eat daily?  2-3    On average, how many sweetened beverages do you drink each day (Examples: soda, juice, sweet tea, etc.  Do NOT count diet or artificially sweetened beverages)?   1    How many days per week do you exercise enough to make your heart beat faster? 7    How many minutes a day do you exercise enough to make your heart beat faster? 20 - 29    How many days per week do you miss taking your medication? 0         Knee pain History:  When did you first notice your pain? - More than 6 weeks   Have you seen this provider for your pain in the past?  No  Where in your body do you have pain? knees  Are you seeing anyone else for your pain? Yes - Was seen at [facility] for knee and back pain on 11/5/21.  She had x-rays taken which were consistent with osteoarthritis.  She was also seen for chronic low back pain symptoms with some radiation into the legs.  They suggested that she consider corticosteroid injections in the knees to see if this helps, however, she declined the injections.    PHQ-9 SCORE 11/5/2020 12/10/2020 11/11/2021   PHQ-9 Total Score - - -   PHQ-9 Total Score MyChart - - -   PHQ-9 Total Score 0 5 7       PDMP Review     None        Last CSA Agreement:   CSA -- Patient Level:    CSA: None found at the patient level.         Review of Systems   Constitutional, HEENT, cardiovascular, pulmonary, gi and gu systems are negative, except as otherwise noted.      Objective    BP (!) 144/76   Pulse 90   Temp 97.2  F (36.2  C) (Temporal)   Wt 68.4 kg (150 lb 14.4 oz)   LMP  (LMP Unknown)   SpO2 98%   BMI 24.36 kg/m    Body mass index is 24.36 kg/m .  Physical Exam   GENERAL: healthy, alert and no distress  EYES: Eyes grossly normal to inspection, PERRL and conjunctivae and sclerae normal  NECK: no adenopathy, no asymmetry, masses,  or scars and thyroid normal to palpation  RESP: lungs clear to auscultation - no rales, rhonchi or wheezes  CV: regular rate and rhythm, normal S1 S2, no S3 or S4, no murmur, click or rub, no peripheral edema and peripheral pulses strong  MS: Trace effusions in the knees.  There is crepitation with flexion and extension maneuvers.  Gait is unsteady.  She uses a cane to ambulate.  NEURO: Normal strength and tone, mentation intact and speech normal  PSYCH: mentation and affect appear normal

## 2021-12-15 ENCOUNTER — OFFICE VISIT (OUTPATIENT)
Dept: FAMILY MEDICINE | Facility: CLINIC | Age: 81
End: 2021-12-15
Payer: COMMERCIAL

## 2021-12-15 VITALS
DIASTOLIC BLOOD PRESSURE: 76 MMHG | BODY MASS INDEX: 24.36 KG/M2 | SYSTOLIC BLOOD PRESSURE: 144 MMHG | HEART RATE: 90 BPM | TEMPERATURE: 97.2 F | OXYGEN SATURATION: 98 % | WEIGHT: 150.9 LBS

## 2021-12-15 DIAGNOSIS — F32.5 MAJOR DEPRESSION IN COMPLETE REMISSION (H): ICD-10-CM

## 2021-12-15 DIAGNOSIS — M17.0 PRIMARY OSTEOARTHRITIS OF KNEES, BILATERAL: ICD-10-CM

## 2021-12-15 DIAGNOSIS — I10 ESSENTIAL HYPERTENSION, BENIGN: Primary | ICD-10-CM

## 2021-12-15 PROCEDURE — 99214 OFFICE O/P EST MOD 30 MIN: CPT | Performed by: FAMILY MEDICINE

## 2022-04-15 ENCOUNTER — PATIENT OUTREACH (OUTPATIENT)
Dept: GERIATRIC MEDICINE | Facility: CLINIC | Age: 82
End: 2022-04-15

## 2022-04-15 NOTE — PROGRESS NOTES
[facility] Care Coordination Contact    Member is calling and  wants to go to a different adult day care:  [facility] NPI:  [id number]  Phone: [phone] Fax: [phone]  Start: 2/4/2022  Discussed with member that she will be receiving care plan change letter via mail .  Also, this CC discussed if member wanted other POC or other information sent to providers per requirement.  Member at this time is choosing not to share any information with  providers.  Instructed to review and sign letter as well as mail the letter back to CC with enclosed prestamped envelop.  Also, informed CMS to update auth and mail provider change letter.  Johnna Malin RN BSN PHN  [facility]   Care Coordinator   Phone:   [phone]  Fax:       [phone]

## 2022-04-15 NOTE — PROGRESS NOTES
[facility] Care Coordination Contact      [facility] Six-Month Telephone Assessment    6 month telephone assessment completed on 04/15/2022.    ER visits: No  Hospitalizations: No  TCU stays: No  Significant health status changes: n/a  Falls/Injuries: No  ADL/IADL changes: No  Changes in services: No    Caregiver Assessment follow up:  n/a    Goals: See POC in chart for goal progress documentation.     Will see member in 6 months for an annual health risk assessment.   Encouraged member to call CC with any questions or concerns in the meantime.     Johnna Malin RN BSN PHN      [facility]  Care Coordinator  Phone:   [phone]  Fax:       [phone]

## 2022-04-18 NOTE — PROGRESS NOTES
[facility] Care Coordination Contact    Member Signature - POC Change:  Per CC, member has made a change to their POC.  Care Plan Change Letter mailed to member for signature with a self-addressed return envelope.     Re: Change to [facility] SANDRO Murillo  Care Management Specialist  [facility]  [phone]

## 2022-05-20 NOTE — PROGRESS NOTES
2nd Attempt: Signed Letter not received from member, resent per process.     Marjroie Epstein  Case Management Specialist  [facility]  [phone]

## 2022-05-24 ENCOUNTER — PATIENT OUTREACH (OUTPATIENT)
Dept: GERIATRIC MEDICINE | Facility: CLINIC | Age: 82
End: 2022-05-24

## 2022-05-24 NOTE — PROGRESS NOTES
"[facility] Care Coordination Contact  CC received notification of Emergency Room visit via [employer] inbasket:   \"Breana Loera Halimo S, RN Daelbert Gonzalez had an ER visit on 5/20/22 at [facility].\"  Dx:  Hypertension.    CC contacted member and reviewed discharge summary.  Member has a follow-up appointment with PCP: No: Offered Assistance with setting up a follow up appointment   Member has had a change in condition: No  New referrals placed: No  Home Visit Needed: No  Care plan reviewed and updated.  Also, discussed that CC has not received change in POC letter back.  Member requested to have letter mailed back again and member will have daughter or family look at it for her for her to sign and mail back to CC.  PCP notified of ED visit via EMR.      "

## 2022-06-21 ENCOUNTER — PATIENT OUTREACH (OUTPATIENT)
Dept: GERIATRIC MEDICINE | Facility: CLINIC | Age: 82
End: 2022-06-21

## 2022-06-21 NOTE — PROGRESS NOTES
[facility] Care Coordination Contact      [facility] Six-Month Telephone Assessment    6 month telephone assessment completed on 06/21/2022.    ER visits: Yes -  [facility]  Hospitalizations: No  TCU stays: No  Significant health status changes: n/a  Falls/Injuries: No  ADL/IADL changes: No  Changes in services: No    Caregiver Assessment follow up:  n/a    Goals: See POC in chart for goal progress documentation.     Will see member in 6 months for an annual health risk assessment.   Encouraged member to call CC with any questions or concerns in the meantime.     Johnna Malin RN BSN PHN      [facility]  Care Coordinator  Phone:   [phone]  Fax:       [phone]

## 2022-07-01 ENCOUNTER — PATIENT OUTREACH (OUTPATIENT)
Dept: GERIATRIC MEDICINE | Facility: CLINIC | Age: 82
End: 2022-07-01

## 2022-07-01 NOTE — PROGRESS NOTES
[facility] Care Coordination Contact       Member is calling and  wants to go to a different [facility], [facility].  Discussed with member that she will be receiving care plan change letter via mail .  Also, this CC discussed if member wanted other POC or other information sent to providers per requirement.  Member at this time is choosing not to share any information with  providers.  Instructed to review and sign letter as well as mail the letter back to CC with the mailed enclosed prestamped envelop.  Also, informed CMS to update auth and mail provider change letter.  Johnna Malin RN BSN PHN  [facility]   Care Coordinator   Phone:   [phone]  Fax:       [phone]

## 2022-07-05 NOTE — PROGRESS NOTES
[facility] Care Coordination Contact    Member Signature - POC Change:  Per CC, member has made a change to their POC.  Care Plan Change Letter mailed to member for signature with a self-addressed return envelope.     Re: [facility]    Nimisha Murillo  Care Management Specialist  [facility]  [phone]

## 2022-08-26 ENCOUNTER — PATIENT OUTREACH (OUTPATIENT)
Dept: GERIATRIC MEDICINE | Facility: CLINIC | Age: 82
End: 2022-08-26

## 2022-08-26 NOTE — PROGRESS NOTES
[facility] Care Coordination Contact    No call to member.  Comp[employer] Kirsty Regulatory Update.  Johnna Malin RN BSN PHN      [facility]  Care Coordinator  Phone:   [phone]  Fax:       [phone]

## 2022-08-30 ENCOUNTER — PATIENT OUTREACH (OUTPATIENT)
Dept: GERIATRIC MEDICINE | Facility: CLINIC | Age: 82
End: 2022-08-30

## 2022-09-27 ENCOUNTER — PATIENT OUTREACH (OUTPATIENT)
Dept: GERIATRIC MEDICINE | Facility: CLINIC | Age: 82
End: 2022-09-27

## 2022-09-27 NOTE — PROGRESS NOTES
[facility] Care Coordination Contact    Encounter opened due to Regulatory Compass Kirsty Update to open FVP Program.    Nimisha Murillo  Care Management Specialist  [facility]  [phone]

## 2022-09-27 NOTE — PROGRESS NOTES
[facility] Care Coordination Contact    Encounter opened due to Regulatory Compass Kirsty Update to close FVP Program.    Nimisha Murillo  Care Management Specialist  [facility]  [phone]

## 2022-09-28 ENCOUNTER — TELEPHONE (OUTPATIENT)
Dept: FAMILY MEDICINE | Facility: CLINIC | Age: 82
End: 2022-09-28

## 2022-10-13 ENCOUNTER — PATIENT OUTREACH (OUTPATIENT)
Dept: GERIATRIC MEDICINE | Facility: CLINIC | Age: 82
End: 2022-10-13

## 2022-10-13 NOTE — PROGRESS NOTES
[facility] Care Coordination Contact    Called member to schedule annual HRA telephonic visit. HRA has been scheduled for 10/14/2022.   Johnna Malin RN BSN PHN      [facility]  Care Coordinator  Phone:   [phone]  Fax:       [phone]

## 2022-10-14 ENCOUNTER — PATIENT OUTREACH (OUTPATIENT)
Dept: GERIATRIC MEDICINE | Facility: CLINIC | Age: 82
End: 2022-10-14

## 2022-10-14 ASSESSMENT — ACTIVITIES OF DAILY LIVING (ADL): DEPENDENT_IADLS:: CLEANING;LAUNDRY;COOKING;SHOPPING;MEAL PREPARATION;TRANSPORTATION;MEDICATION MANAGEMENT

## 2022-10-14 NOTE — PROGRESS NOTES
[facility] Care Coordination Contact    [facility] Home Visit Assessment     Telephionic visit for Health Risk Assessment with Rody Gonzalez completed on October 14th, 2022    Type of residence:: Apartment  Current living arrangement:: I live alone     Assessment completed with:: Patient    Current Care Plan  Member currently receiving the following home care services:   N/A  Member currently receiving the following community resources: Day Care, Transportation Services, Housekeeping/Chore Agency      Medication Review  Medication reconciliation completed in Epic: Yes  Medication set-up & administration: Family/informal caregiver sets up daily.  Family caregiver administers medications.  Medication Risk Assessment Medication (1 or more, place referral to MTM): N/A: No risk factors identified  MTM Referral Placed: No: No risk factors idenified    Mental/Behavioral Health   Depression Screening:   PHQ-2 Total Score (Adult) - Positive if 3 or more points; Administer PHQ-9 if positive: 2       Mental health DX:: Yes (Anxiety)        ADL/IADL Dependencies:   Dependent ADLs:: Independent  Dependent IADLs:: Cleaning, Laundry, Cooking, Shopping, Meal Preparation, Transportation, Medication Management    [facility] Plan sponsored benefits: Shared information re: Silver Sneakers/gym memberships, ASA, Calcium +D.    PCA Assessment completed at visit: Yes PCA assessment indicated that member does not meet access criteria for PCA.   Member reports ADL independence and is declining PCA services.  PCA services discontinued.    Elderly Waiver Eligibility: Yes-will continue on EW    Care Plan & Recommendations: Member to continue adult day care, adult day care transportation and homemaking services.    See LTCC for detailed assessment information.    Follow-Up Plan: Member informed of future contact, plan to f/u with member with a 6 month telephone assessment.  Contact information shared with member and family,  encouraged member to call with any questions or concerns at any time.    [place] care continuum providers: Please see Snapshot and Care Management Flowsheets for Specific details of care plan.    This CC note routed to PCP.    Johnna Malin RN BSN PHN      [facility]  Care Coordinator  Phone:   [phone]  Fax:       [phone]

## 2022-10-24 ENCOUNTER — PATIENT OUTREACH (OUTPATIENT)
Dept: GERIATRIC MEDICINE | Facility: CLINIC | Age: 82
End: 2022-10-24

## 2022-10-24 NOTE — LETTER
October 24, 2022      SKYE MUNOZ  [address]   [place] [address]      Dear Skye:    At [facility], we re dedicated to improving your health and wellness. Enclosed is the Care Plan developed with you on 10/14/2022. Please review the Care Plan carefully.    As a reminder, during your visit we talked about:    Ways to manage your physical and mental health    Using health care to maintain and improve your health     Your preventive care needs     Remember to contact your care coordinator if you:    Are hospitalized, or plan to be hospitalized     Have a fall      Have a change in your physical or mental health    Need help finding support or services    If you have questions, or don t agree with your Care Plan, call me at [phone]. You can also call me if your needs change. TTY users, call the [place] [employer] at (804) or 1-711.544.6945 (dtzjyb-al-cfseqj relay service).    Sincerely,    CANDICE Collins, RN, PHN    E-mail: [email or web address]  Phone: [phone]      [facility]) is a health plan that contracts with both Medicare and the [place] Medical Assistance (Medicaid) program to provide benefits of both programs to enrollees. Enrollment in [facility] depends on contract renewal.    [id number] accepted    [id number] (07/2022)

## 2022-10-24 NOTE — PROGRESS NOTES
[facility] Care Coordination Contact    Received after visit chart from care coordinator.  Completed following tasks: Mailed copy of care plan to client, Updated services in Database, Submitted referrals/auths for ADC, transportation and hmkg, Mailed copy of POC signature sheet for member to sign and return in SASE  and Mailed [facility] Safe Medication Disposal   , Provider Signature - No POC Shared:  Member indicates that they do not want their POC shared with any EW providers.    [facility]:  Emailed completed PCA assessment to [facility].  Faxed copy of PCA assessment to PCA Agency and mailed copy to member.  Faxed MD Communication to PCP. Also mailed PCA sig page to member.   -PCA denied    Nimisha Murillo  Care Management Specialist  [facility]  [phone]

## 2022-12-13 NOTE — PROGRESS NOTES
[facility] Care Coordination Contact    Per CC, resent EOV paperwork to member asking them to sign and return POC/PCA sig pages.     Nimisha Murillo  Care Management Specialist  [facility]  [phone]

## 2022-12-27 ENCOUNTER — PATIENT OUTREACH (OUTPATIENT)
Dept: GERIATRIC MEDICINE | Facility: CLINIC | Age: 82
End: 2022-12-27

## 2022-12-27 NOTE — PROGRESS NOTES
[facility] Care Coordination Contact    Received VM from member's current PCP.  Dr. Bolden, Amado Twin Max  With [employer] at [phone] phone.  Address:  [address] [place] [address]  Placed a call to [employer] [facility] and updated that since member changed clinics/PCP then member will be transferred and will have a new CC.  Updated CMS to update clinic/PCP and plan is to DE member on 02/01/2023.  Johnna Malin RN BSN PHN      [facility]  Care Coordinator  Phone:   [phone]  Fax:       [phone]

## 2023-01-05 NOTE — PATIENT INSTRUCTIONS
(K04.7) Dental abscess  (primary encounter diagnosis)  Comment:    Plan: DENTAL REFERRAL, amoxicillin (AMOXIL) 500 MG         capsule, DISCONTINUED: amoxicillin (AMOXIL) 500        MG capsule             (K21.9) Gastroesophageal reflux disease without esophagitis  Comment:    Plan: omeprazole (KLS OMPERAZOLE) 20 MG tablet             (M51.36) DDD (degenerative disc disease), lumbar  Comment:    Plan:      (H04.123) Tear film insufficiency, bilateral  Comment:    Plan:      (R41.3) Memory loss  Comment:    Plan:      (G44.229) Chronic tension-type headache, not intractable  Comment:    Plan:      (R26.9) Abnormality of gait  Comment:    Plan:      (H90.3) Sensorineural hearing loss (SNHL) of both ears  Comment:    Plan:      (R53.81,  R53.83) Malaise and fatigue  Comment:    Plan:      (F32.5) Major depression in complete remission (H)  Comment:    Plan:      (H90.5) Sensory hearing loss, unilateral  Comment:    Plan:      (E55.9) Vitamin D deficiency disease  Comment:    Plan:      (E78.5) Hyperlipidemia LDL goal <160  Comment:    Plan: atorvastatin (LIPITOR) 10 MG tablet             (E66.3) Overweight (BMI 25.0-29.9)  Comment:    Plan:      (Z91.81) Risk for falls  Comment:    Plan:      (G44.209) Tension headache  Comment:    Plan: aspirin-acetaminophen-caffeine (EXCEDRIN         MIGRAINE) 250-250-65 MG per tablet             (M81.0) Age related osteoporosis, unspecified pathological fracture presence  Comment:    Plan: lidocaine (XYLOCAINE) 5 % ointment             (G44.219) Episodic tension-type headache, not intractable  Comment:    Plan: metoprolol (TOPROL-XL) 25 MG 24 hr tablet             (I10) Essential hypertension, benign  Comment:    Plan: metoprolol (TOPROL-XL) 25 MG 24 hr tablet             (M17.0) Primary osteoarthritis of both knees  Comment:    Plan: order for DME             (R29.6) Tendency to fall  Comment:    Plan: order for DME             (E55.9) Vitamin D insufficiency  Comment:    Plan:  -- DO NOT REPLY / DO NOT REPLY ALL --  -- Message is from Engagement Center Operations (ECO) --    ONLY TO BE USED WITHIN A REFILL MEDICATION ENCOUNTER    Med Refill  Is the patient currently having any symptoms?: No/Non-Emergent symptoms    Name of medication requested: See pended med    Has patient contacted the pharmacy? Yes (Was told to contact pcp)    Is this the first request for the medication in the last 48 hours?: Yes    Patient is requesting a medication refill - medication is on active medication list    Patient is currently OUT of the requested medication - sent as HIGH priority      Full name of the provider who ordered the medication: Susan Olszewski    [facility] site name / Account # for provider: [facility] [place] - [address]     Preferred Pharmacy: Pharmacy  [id number] - [place], [place] - [address]  At [facility] [address]    Patient confirmed the above pharmacy as correct?  Yes      Caller Information       Type Contact Phone/Fax    01/05/2023 04:06 PM CST Phone (Incoming) Galilea Garcia (Self) [phone] (M)          Alternative phone number: None    Can a detailed message be left?: Yes    Patient is completely out of medication: Verify if patient is currently experiencing symptoms. If patient is symptomatic, proceed with front end triage instead of medication refill. If patient is not symptomatic but is completely out of medication, lisset as High priority when routing. Inform patient: “Please call back with any questions or concerns and if your condition becomes life threatening, you should seek immediate medical assistance by calling 911 or going to the Emergency Department for evaluation.”    Inform all patients: \"If the clinical team needs to contact you regarding this refill, please be aware the return phone call may come from an unidentified or out of state phone number and your refill request will be addressed as soon as the clinical team reviews your  Cholecalciferol (VITAMIN D3) 2000 UNITS TABS,         acetaminophen (MAPAP) 325 MG tablet                  message.\"

## 2023-01-31 ENCOUNTER — PATIENT OUTREACH (OUTPATIENT)
Dept: GERIATRIC MEDICINE | Facility: CLINIC | Age: 83
End: 2023-01-31

## 2023-02-08 NOTE — PROGRESS NOTES
[facility] Care Coordination Contact    No longer active with [facility] case management effective 01/31/2023.  Reason for community disenrollment: Change Care System/PCC to :   [facility]    [phone] phone  [address] [place] [address]  Johnna Malin RN BSN PHN      [facility]  Care Coordinator  Phone:   [phone]  Fax:       [phone]

## 2023-10-10 ENCOUNTER — TELEPHONE (OUTPATIENT)
Dept: AUDIOLOGY | Facility: CLINIC | Age: 83
End: 2023-10-10
Payer: COMMERCIAL

## 2023-10-13 ENCOUNTER — OFFICE VISIT (OUTPATIENT)
Dept: AUDIOLOGY | Facility: CLINIC | Age: 83
End: 2023-10-13
Payer: COMMERCIAL

## 2023-10-13 DIAGNOSIS — H90.6 MIXED CONDUCTIVE AND SENSORINEURAL HEARING LOSS OF BOTH EARS: Primary | ICD-10-CM

## 2023-10-13 PROCEDURE — 92557 COMPREHENSIVE HEARING TEST: CPT | Mod: 52 | Performed by: AUDIOLOGIST

## 2023-10-13 PROCEDURE — 92567 TYMPANOMETRY: CPT | Performed by: AUDIOLOGIST

## 2023-10-13 NOTE — PROGRESS NOTES
AUDIOLOGY REPORT    SUBJECTIVE:  Rody Gonzalez is a 83 year old female who was seen in the Audiology Clinic at the [facility] for audiologic evaluation, referred by Debbie Cheema PA-C. They were accompanied today by their daughter and an  (ID [id number]) The patient has been seen previously in this clinic on 4/30/19 for assessment and results indicated a bilateral mixed hearing loss with right ear poorer. The patient has been seen previously in this clinic and was fit with Resound Linx 3D 7 in-the-ear (ITE) on 12/27/19. She reports that she has lost these hearing aids. The patient reports a decrease in hearing bilaterally with right ear still poorer. She denies ear pain, drainage, tinnitus, or ear surgeries. The patient reports dizziness at times and numerous falls. Her primary care provider is aware of this. The patient notes difficulty with communication in a variety of listening situations.     OBJECTIVE:Otoscopic exam indicates ears are clear of cerumen bilaterally. Note exteremly small ear canals.      Pure Tone Thresholds assessed using conventional audiometry with fair to good  reliability from 250-8000 Hz bilaterally using insert earphones and circumaural headphones     RIGHT: No response from .25-1.5 kHz, Moderately severe to profound/no response likely mixed hearing loss bilaterally from 2-8 kHz.    LEFT:  No response from .25-1.5 kHz, Moderately severe to profound/no response likely mixed hearing loss bilaterally from 2-8 kHz.   Note: Unable to complete masking due to patient fatigue.     Tympanogram:    RIGHT: normal eardrum mobility    LEFT:   normal eardrum mobility    Reflexes (reported by stimulus ear):  RIGHT: Ipsilateral was not completed, unable to maintain seal   RIGHT: Contralateral was not completed, unable to maintain seal   LEFT:   Ipsilateral was not completed, unable to maintain seal   LEFT:   Contralateral was not completed, unable to  maintain seal       Speech Detection Threshold:    RIGHT: No response at equipment limits    LEFT:   No response at equipment limits   Word Recognition Score:     RIGHT: Did not test due to patient fatigue     LEFT: Did not test due to patient fatigue     We discussed obtaining an additional hearing test to check thresholds and to obtain additional information (word recognition and masked air and bone thresholds). In addition, we would want patient to meet with ENT due to the change in hearing. ENT could also provide medical clearance for a new device. Discussed that patient is outside of the loss and damage replacement warranty for the devices (3 years) but is not yet eligible for new devices (every 5 years). Discussed the possibility of trying to prior authorize a new device sooner. However, we would need to complete a consultation for a new device after an additional hearing test because her previous device would no longer be appropriate for her hearing loss. Patient and daughter expressed understanding and agreement with this plan.     ASSESSMENT: Today's evaluation indicates a likely mixed hearing loss bilaterally. Compared to patient's previous audiogram dated 4/30/19, hearing from 250-1.5 kHz has worsened significantly (now no response). Left ear worsened 15-25 dB from 2-6 kHz and is now no response at 8 kHz, right ear worsened 15-20 dB . Today s results were discussed with the patient and her daughter in detail.     PLAN:  Patient was counseled regarding hearing loss and impact on communication. It is recommended that the patient return for a repeat audio and follow up with ENT. Pending further results and medical clearance, return to discuss amplification again if appropriate. Please call this clinic with questions regarding these results or recommendations.      Caren Do. CCC-A  Licensed Audiologist   [place] #[id number]

## 2023-10-16 NOTE — TELEPHONE ENCOUNTER
FUTURE VISIT INFORMATION      FUTURE VISIT INFORMATION:  Date: 1/5/2024  Time: 9:10 AM  Location: [facility]  REFERRAL INFORMATION:  Referring provider:    Referring providers clinic:    Reason for visit/diagnosis  60 minute WIN (repeat audio)   2. Please schedule with ENT for change in hearing and hearing aid clearance   3. Hearing aid consultation     RECORDS REQUESTED FROM:       Clinic name Comments Records Status Imaging Status   [facility] 10/13/23 note- Joya Hassan, AuD   6/25/19 note- Ramila Mark MD     Audiogram:  10/13/2023  [id number] [facility]  MRN: [id number]  CT head 5/20/2022 CE Pending req

## 2023-12-07 DIAGNOSIS — H90.5 SENSORY HEARING LOSS, UNILATERAL: Primary | ICD-10-CM

## 2023-12-12 ENCOUNTER — TELEPHONE (OUTPATIENT)
Dept: OTOLARYNGOLOGY | Facility: CLINIC | Age: 83
End: 2023-12-12
Payer: COMMERCIAL

## 2023-12-12 NOTE — TELEPHONE ENCOUNTER
Writer called to reschedule ENT audio (60 min), ABR, Dr. Mark and [facility] appts. Gave call center number. Send message to clinic if patient calls back

## 2024-01-02 ENCOUNTER — TELEPHONE (OUTPATIENT)
Dept: OTOLARYNGOLOGY | Facility: CLINIC | Age: 84
End: 2024-01-02
Payer: COMMERCIAL

## 2024-01-02 NOTE — TELEPHONE ENCOUNTER
[facility] Call Center    Phone Message    May a detailed message be left on voicemail: yes     Reason for Call: Other: Pt's daughter called back from voicemail that was left for rescheduling. Sending TE to clinic per chart note. Please call daughter back to get pt rescheduled      Action Taken: Other: [facility] ENT    Travel Screening: Not Applicable

## 2024-01-05 ENCOUNTER — PRE VISIT (OUTPATIENT)
Dept: OTOLARYNGOLOGY | Facility: CLINIC | Age: 84
End: 2024-01-05